# Patient Record
Sex: FEMALE | Race: WHITE | NOT HISPANIC OR LATINO | Employment: OTHER | URBAN - METROPOLITAN AREA
[De-identification: names, ages, dates, MRNs, and addresses within clinical notes are randomized per-mention and may not be internally consistent; named-entity substitution may affect disease eponyms.]

---

## 2017-01-09 ENCOUNTER — CONVERSION ENCOUNTER (OUTPATIENT)
Dept: GENERAL RADIOLOGY | Facility: HOSPITAL | Age: 72
End: 2017-01-09

## 2018-01-11 ENCOUNTER — CONVERSION ENCOUNTER (OUTPATIENT)
Dept: GENERAL RADIOLOGY | Facility: HOSPITAL | Age: 73
End: 2018-01-11

## 2019-02-01 ENCOUNTER — HOSPITAL ENCOUNTER (OUTPATIENT)
Dept: GENERAL RADIOLOGY | Facility: HOSPITAL | Age: 74
Discharge: HOME | End: 2019-02-01
Attending: INTERNAL MEDICINE

## 2019-05-02 ENCOUNTER — HOSPITAL ENCOUNTER (OUTPATIENT)
Dept: LAB | Facility: HOSPITAL | Age: 74
Discharge: HOME | End: 2019-05-02
Attending: INTERNAL MEDICINE

## 2019-05-02 LAB
ALBUMIN SERPL-MCNC: 4.2 G/DL (ref 3.5–5)
ALBUMIN/GLOB SERPL: 1.4 {RATIO} (ref 1.4–2.6)
ALP SERPL-CCNC: 56 U/L (ref 43–160)
ALT SERPL-CCNC: 9 U/L (ref 10–40)
ANION GAP SERPL CALC-SCNC: 19 MMOL/L (ref 8–19)
AST SERPL-CCNC: 19 U/L (ref 15–50)
BASOPHILS # BLD AUTO: 0.04 10*3/UL (ref 0–0.2)
BASOPHILS NFR BLD AUTO: 0.7 % (ref 0–3)
BILIRUB SERPL-MCNC: 0.79 MG/DL (ref 0.2–1.3)
BUN SERPL-MCNC: 17 MG/DL (ref 5–25)
BUN/CREAT SERPL: 18 {RATIO} (ref 6–20)
CALCIUM SERPL-MCNC: 9.8 MG/DL (ref 8.7–10.4)
CHLORIDE SERPL-SCNC: 100 MMOL/L (ref 99–111)
CONV ABS IMM GRAN: 0.01 10*3/UL (ref 0–0.2)
CONV CO2: 26 MMOL/L (ref 22–32)
CONV IMMATURE GRAN: 0.2 % (ref 0–1.8)
CONV TOTAL PROTEIN: 7.2 G/DL (ref 6.3–8.2)
CREAT UR-MCNC: 0.96 MG/DL (ref 0.5–0.9)
DEPRECATED RDW RBC AUTO: 46.2 FL (ref 36.4–46.3)
EOSINOPHIL # BLD AUTO: 0.16 10*3/UL (ref 0–0.7)
EOSINOPHIL # BLD AUTO: 2.9 % (ref 0–7)
ERYTHROCYTE [DISTWIDTH] IN BLOOD BY AUTOMATED COUNT: 13.1 % (ref 11.7–14.4)
EST. AVERAGE GLUCOSE BLD GHB EST-MCNC: 103 MG/DL
GFR SERPLBLD BASED ON 1.73 SQ M-ARVRAT: 57 ML/MIN/{1.73_M2}
GLOBULIN UR ELPH-MCNC: 3 G/DL (ref 2–3.5)
GLUCOSE SERPL-MCNC: 95 MG/DL (ref 65–99)
HBA1C MFR BLD: 12.7 G/DL (ref 12–16)
HBA1C MFR BLD: 5.2 % (ref 3.5–5.7)
HCT VFR BLD AUTO: 38.8 % (ref 37–47)
LYMPHOCYTES # BLD AUTO: 1.67 10*3/UL (ref 1–5)
MCH RBC QN AUTO: 31.4 PG (ref 27–31)
MCHC RBC AUTO-ENTMCNC: 32.7 G/DL (ref 33–37)
MCV RBC AUTO: 96 FL (ref 81–99)
MONOCYTES # BLD AUTO: 0.56 10*3/UL (ref 0.2–1.2)
MONOCYTES NFR BLD AUTO: 10.3 % (ref 3–10)
NEUTROPHILS # BLD AUTO: 3 10*3/UL (ref 2–8)
NEUTROPHILS NFR BLD AUTO: 55.2 % (ref 30–85)
NRBC CBCN: 0 % (ref 0–0.7)
OSMOLALITY SERPL CALC.SUM OF ELEC: 293 MOSM/KG (ref 273–304)
PLATELET # BLD AUTO: 190 10*3/UL (ref 130–400)
PMV BLD AUTO: 11.2 FL (ref 9.4–12.3)
POTASSIUM SERPL-SCNC: 4.3 MMOL/L (ref 3.5–5.3)
RBC # BLD AUTO: 4.04 10*6/UL (ref 4.2–5.4)
SODIUM SERPL-SCNC: 141 MMOL/L (ref 135–147)
VARIANT LYMPHS NFR BLD MANUAL: 30.7 % (ref 20–45)
WBC # BLD AUTO: 5.44 10*3/UL (ref 4.8–10.8)

## 2019-05-08 ENCOUNTER — HOSPITAL ENCOUNTER (OUTPATIENT)
Dept: GENERAL RADIOLOGY | Facility: HOSPITAL | Age: 74
Discharge: HOME | End: 2019-05-08
Attending: INTERNAL MEDICINE

## 2019-05-14 ENCOUNTER — HOSPITAL ENCOUNTER (OUTPATIENT)
Dept: GENERAL RADIOLOGY | Facility: HOSPITAL | Age: 74
Discharge: HOME | End: 2019-05-14
Attending: INTERNAL MEDICINE

## 2019-05-22 ENCOUNTER — HOSPITAL ENCOUNTER (OUTPATIENT)
Dept: GENERAL RADIOLOGY | Facility: HOSPITAL | Age: 74
Discharge: HOME | End: 2019-05-22
Attending: PHYSICIAN ASSISTANT

## 2019-06-20 ENCOUNTER — OFFICE VISIT CONVERTED (OUTPATIENT)
Dept: NEUROSURGERY | Facility: CLINIC | Age: 74
End: 2019-06-20
Attending: NEUROLOGICAL SURGERY

## 2019-06-20 ENCOUNTER — CONVERSION ENCOUNTER (OUTPATIENT)
Dept: NEUROLOGY | Facility: CLINIC | Age: 74
End: 2019-06-20

## 2019-08-01 ENCOUNTER — CONVERSION ENCOUNTER (OUTPATIENT)
Dept: NEUROLOGY | Facility: CLINIC | Age: 74
End: 2019-08-01

## 2019-08-01 ENCOUNTER — OFFICE VISIT CONVERTED (OUTPATIENT)
Dept: NEUROSURGERY | Facility: CLINIC | Age: 74
End: 2019-08-01
Attending: NEUROLOGICAL SURGERY

## 2019-12-18 ENCOUNTER — HOSPITAL ENCOUNTER (OUTPATIENT)
Dept: LAB | Facility: HOSPITAL | Age: 74
Discharge: HOME | End: 2019-12-18
Attending: INTERNAL MEDICINE

## 2019-12-18 LAB
ALBUMIN SERPL-MCNC: 4.3 G/DL (ref 3.5–5)
ALBUMIN/GLOB SERPL: 1.5 {RATIO} (ref 1.4–2.6)
ALP SERPL-CCNC: 57 U/L (ref 43–160)
ALT SERPL-CCNC: 9 U/L (ref 10–40)
ANION GAP SERPL CALC-SCNC: 15 MMOL/L (ref 8–19)
AST SERPL-CCNC: 16 U/L (ref 15–50)
BASOPHILS # BLD AUTO: 0.05 10*3/UL (ref 0–0.2)
BASOPHILS NFR BLD AUTO: 0.8 % (ref 0–3)
BILIRUB SERPL-MCNC: 0.48 MG/DL (ref 0.2–1.3)
BUN SERPL-MCNC: 23 MG/DL (ref 5–25)
BUN/CREAT SERPL: 27 {RATIO} (ref 6–20)
CALCIUM SERPL-MCNC: 9.3 MG/DL (ref 8.7–10.4)
CHLORIDE SERPL-SCNC: 101 MMOL/L (ref 99–111)
CONV ABS IMM GRAN: 0.02 10*3/UL (ref 0–0.2)
CONV CO2: 27 MMOL/L (ref 22–32)
CONV IMMATURE GRAN: 0.3 % (ref 0–1.8)
CONV TOTAL PROTEIN: 7.1 G/DL (ref 6.3–8.2)
CREAT UR-MCNC: 0.84 MG/DL (ref 0.5–0.9)
DEPRECATED RDW RBC AUTO: 46.1 FL (ref 36.4–46.3)
EOSINOPHIL # BLD AUTO: 0.18 10*3/UL (ref 0–0.7)
EOSINOPHIL # BLD AUTO: 2.8 % (ref 0–7)
ERYTHROCYTE [DISTWIDTH] IN BLOOD BY AUTOMATED COUNT: 12.8 % (ref 11.7–14.4)
EST. AVERAGE GLUCOSE BLD GHB EST-MCNC: 100 MG/DL
GFR SERPLBLD BASED ON 1.73 SQ M-ARVRAT: >60 ML/MIN/{1.73_M2}
GLOBULIN UR ELPH-MCNC: 2.8 G/DL (ref 2–3.5)
GLUCOSE SERPL-MCNC: 95 MG/DL (ref 65–99)
HBA1C MFR BLD: 5.1 % (ref 3.5–5.7)
HCT VFR BLD AUTO: 38.7 % (ref 37–47)
HGB BLD-MCNC: 12.7 G/DL (ref 12–16)
LYMPHOCYTES # BLD AUTO: 1.5 10*3/UL (ref 1–5)
LYMPHOCYTES NFR BLD AUTO: 23.5 % (ref 20–45)
MCH RBC QN AUTO: 31.8 PG (ref 27–31)
MCHC RBC AUTO-ENTMCNC: 32.8 G/DL (ref 33–37)
MCV RBC AUTO: 96.8 FL (ref 81–99)
MONOCYTES # BLD AUTO: 0.46 10*3/UL (ref 0.2–1.2)
MONOCYTES NFR BLD AUTO: 7.2 % (ref 3–10)
NEUTROPHILS # BLD AUTO: 4.17 10*3/UL (ref 2–8)
NEUTROPHILS NFR BLD AUTO: 65.4 % (ref 30–85)
NRBC CBCN: 0 % (ref 0–0.7)
OSMOLALITY SERPL CALC.SUM OF ELEC: 291 MOSM/KG (ref 273–304)
PLATELET # BLD AUTO: 191 10*3/UL (ref 130–400)
PMV BLD AUTO: 11.1 FL (ref 9.4–12.3)
POTASSIUM SERPL-SCNC: 4.3 MMOL/L (ref 3.5–5.3)
RBC # BLD AUTO: 4 10*6/UL (ref 4.2–5.4)
SODIUM SERPL-SCNC: 139 MMOL/L (ref 135–147)
TSH SERPL-ACNC: 3.02 M[IU]/L (ref 0.27–4.2)
WBC # BLD AUTO: 6.38 10*3/UL (ref 4.8–10.8)

## 2019-12-30 ENCOUNTER — HOSPITAL ENCOUNTER (OUTPATIENT)
Dept: GENERAL RADIOLOGY | Facility: HOSPITAL | Age: 74
Discharge: HOME | End: 2019-12-30
Attending: INTERNAL MEDICINE

## 2020-01-07 ENCOUNTER — HOSPITAL ENCOUNTER (OUTPATIENT)
Dept: GENERAL RADIOLOGY | Facility: HOSPITAL | Age: 75
Discharge: HOME | End: 2020-01-07
Attending: PHYSICIAN ASSISTANT

## 2020-01-07 LAB
CREAT BLD-MCNC: 0.9 MG/DL (ref 0.6–1.4)
GFR SERPLBLD BASED ON 1.73 SQ M-ARVRAT: >60 ML/MIN/{1.73_M2}

## 2020-02-03 ENCOUNTER — HOSPITAL ENCOUNTER (OUTPATIENT)
Dept: GENERAL RADIOLOGY | Facility: HOSPITAL | Age: 75
Discharge: HOME | End: 2020-02-03
Attending: INTERNAL MEDICINE

## 2020-06-24 ENCOUNTER — HOSPITAL ENCOUNTER (OUTPATIENT)
Dept: LAB | Facility: HOSPITAL | Age: 75
Discharge: HOME | End: 2020-06-24
Attending: INTERNAL MEDICINE

## 2020-06-24 LAB
ALBUMIN SERPL-MCNC: 4 G/DL (ref 3.5–5)
ALBUMIN/GLOB SERPL: 1.5 {RATIO} (ref 1.4–2.6)
ALP SERPL-CCNC: 65 U/L (ref 43–160)
ALT SERPL-CCNC: 9 U/L (ref 10–40)
ANION GAP SERPL CALC-SCNC: 15 MMOL/L (ref 8–19)
AST SERPL-CCNC: 16 U/L (ref 15–50)
BASOPHILS # BLD AUTO: 0.04 10*3/UL (ref 0–0.2)
BASOPHILS NFR BLD AUTO: 0.6 % (ref 0–3)
BILIRUB SERPL-MCNC: 0.48 MG/DL (ref 0.2–1.3)
BUN SERPL-MCNC: 14 MG/DL (ref 5–25)
BUN/CREAT SERPL: 16 {RATIO} (ref 6–20)
CALCIUM SERPL-MCNC: 9.5 MG/DL (ref 8.7–10.4)
CHLORIDE SERPL-SCNC: 104 MMOL/L (ref 99–111)
CHOLEST SERPL-MCNC: 145 MG/DL (ref 107–200)
CHOLEST/HDLC SERPL: 3 {RATIO} (ref 3–6)
CONV ABS IMM GRAN: 0.02 10*3/UL (ref 0–0.2)
CONV CO2: 26 MMOL/L (ref 22–32)
CONV IMMATURE GRAN: 0.3 % (ref 0–1.8)
CONV TOTAL PROTEIN: 6.7 G/DL (ref 6.3–8.2)
CREAT UR-MCNC: 0.85 MG/DL (ref 0.5–0.9)
DEPRECATED RDW RBC AUTO: 44.5 FL (ref 36.4–46.3)
EOSINOPHIL # BLD AUTO: 0.24 10*3/UL (ref 0–0.7)
EOSINOPHIL # BLD AUTO: 3.6 % (ref 0–7)
ERYTHROCYTE [DISTWIDTH] IN BLOOD BY AUTOMATED COUNT: 12.8 % (ref 11.7–14.4)
GFR SERPLBLD BASED ON 1.73 SQ M-ARVRAT: >60 ML/MIN/{1.73_M2}
GLOBULIN UR ELPH-MCNC: 2.7 G/DL (ref 2–3.5)
GLUCOSE SERPL-MCNC: 94 MG/DL (ref 65–99)
HCT VFR BLD AUTO: 38.2 % (ref 37–47)
HDLC SERPL-MCNC: 48 MG/DL (ref 40–60)
HGB BLD-MCNC: 12.5 G/DL (ref 12–16)
LDLC SERPL CALC-MCNC: 73 MG/DL (ref 70–100)
LYMPHOCYTES # BLD AUTO: 1.84 10*3/UL (ref 1–5)
LYMPHOCYTES NFR BLD AUTO: 27.7 % (ref 20–45)
MCH RBC QN AUTO: 31 PG (ref 27–31)
MCHC RBC AUTO-ENTMCNC: 32.7 G/DL (ref 33–37)
MCV RBC AUTO: 94.8 FL (ref 81–99)
MONOCYTES # BLD AUTO: 0.67 10*3/UL (ref 0.2–1.2)
MONOCYTES NFR BLD AUTO: 10.1 % (ref 3–10)
NEUTROPHILS # BLD AUTO: 3.83 10*3/UL (ref 2–8)
NEUTROPHILS NFR BLD AUTO: 57.7 % (ref 30–85)
NRBC CBCN: 0 % (ref 0–0.7)
OSMOLALITY SERPL CALC.SUM OF ELEC: 292 MOSM/KG (ref 273–304)
PLATELET # BLD AUTO: 198 10*3/UL (ref 130–400)
PMV BLD AUTO: 11.2 FL (ref 9.4–12.3)
POTASSIUM SERPL-SCNC: 4.3 MMOL/L (ref 3.5–5.3)
RBC # BLD AUTO: 4.03 10*6/UL (ref 4.2–5.4)
SODIUM SERPL-SCNC: 141 MMOL/L (ref 135–147)
TRIGL SERPL-MCNC: 122 MG/DL (ref 40–150)
VLDLC SERPL-MCNC: 24 MG/DL (ref 5–37)
WBC # BLD AUTO: 6.64 10*3/UL (ref 4.8–10.8)

## 2020-08-17 ENCOUNTER — HOSPITAL ENCOUNTER (OUTPATIENT)
Dept: LAB | Facility: HOSPITAL | Age: 75
Discharge: HOME | End: 2020-08-17
Attending: INTERNAL MEDICINE

## 2020-08-17 LAB
ANION GAP SERPL CALC-SCNC: 18 MMOL/L (ref 8–19)
BUN SERPL-MCNC: 16 MG/DL (ref 5–25)
BUN/CREAT SERPL: 15 {RATIO} (ref 6–20)
CALCIUM SERPL-MCNC: 10.2 MG/DL (ref 8.7–10.4)
CHLORIDE SERPL-SCNC: 102 MMOL/L (ref 99–111)
CONV CO2: 26 MMOL/L (ref 22–32)
CREAT UR-MCNC: 1.1 MG/DL (ref 0.5–0.9)
GFR SERPLBLD BASED ON 1.73 SQ M-ARVRAT: 48 ML/MIN/{1.73_M2}
GLUCOSE SERPL-MCNC: 105 MG/DL (ref 65–99)
OSMOLALITY SERPL CALC.SUM OF ELEC: 296 MOSM/KG (ref 273–304)
POTASSIUM SERPL-SCNC: 4.3 MMOL/L (ref 3.5–5.3)
SODIUM SERPL-SCNC: 142 MMOL/L (ref 135–147)

## 2021-02-04 ENCOUNTER — HOSPITAL ENCOUNTER (OUTPATIENT)
Dept: CARDIOLOGY | Facility: HOSPITAL | Age: 76
Discharge: HOME | End: 2021-02-04
Attending: INTERNAL MEDICINE

## 2021-02-08 ENCOUNTER — HOSPITAL ENCOUNTER (OUTPATIENT)
Dept: LAB | Facility: HOSPITAL | Age: 76
Discharge: HOME | End: 2021-02-08
Attending: INTERNAL MEDICINE

## 2021-02-08 LAB
ALBUMIN SERPL-MCNC: 4.1 G/DL (ref 3.5–5)
ALBUMIN/GLOB SERPL: 1.6 {RATIO} (ref 1.4–2.6)
ALP SERPL-CCNC: 52 U/L (ref 43–160)
ALT SERPL-CCNC: 9 U/L (ref 10–40)
ANION GAP SERPL CALC-SCNC: 16 MMOL/L (ref 8–19)
AST SERPL-CCNC: 18 U/L (ref 15–50)
BASOPHILS # BLD AUTO: 0.04 10*3/UL (ref 0–0.2)
BASOPHILS NFR BLD AUTO: 0.7 % (ref 0–3)
BILIRUB SERPL-MCNC: 0.5 MG/DL (ref 0.2–1.3)
BUN SERPL-MCNC: 22 MG/DL (ref 5–25)
BUN/CREAT SERPL: 25 {RATIO} (ref 6–20)
CALCIUM SERPL-MCNC: 9.5 MG/DL (ref 8.7–10.4)
CHLORIDE SERPL-SCNC: 104 MMOL/L (ref 99–111)
CONV ABS IMM GRAN: 0.02 10*3/UL (ref 0–0.2)
CONV CO2: 25 MMOL/L (ref 22–32)
CONV IMMATURE GRAN: 0.3 % (ref 0–1.8)
CONV TOTAL PROTEIN: 6.6 G/DL (ref 6.3–8.2)
CREAT UR-MCNC: 0.88 MG/DL (ref 0.5–0.9)
DEPRECATED RDW RBC AUTO: 46.8 FL (ref 36.4–46.3)
EOSINOPHIL # BLD AUTO: 0.15 10*3/UL (ref 0–0.7)
EOSINOPHIL # BLD AUTO: 2.5 % (ref 0–7)
ERYTHROCYTE [DISTWIDTH] IN BLOOD BY AUTOMATED COUNT: 13.2 % (ref 11.7–14.4)
GFR SERPLBLD BASED ON 1.73 SQ M-ARVRAT: >60 ML/MIN/{1.73_M2}
GLOBULIN UR ELPH-MCNC: 2.5 G/DL (ref 2–3.5)
GLUCOSE SERPL-MCNC: 97 MG/DL (ref 65–99)
HCT VFR BLD AUTO: 37.3 % (ref 37–47)
HGB BLD-MCNC: 12 G/DL (ref 12–16)
LYMPHOCYTES # BLD AUTO: 1.58 10*3/UL (ref 1–5)
LYMPHOCYTES NFR BLD AUTO: 26.6 % (ref 20–45)
MCH RBC QN AUTO: 30.9 PG (ref 27–31)
MCHC RBC AUTO-ENTMCNC: 32.2 G/DL (ref 33–37)
MCV RBC AUTO: 96.1 FL (ref 81–99)
MONOCYTES # BLD AUTO: 0.49 10*3/UL (ref 0.2–1.2)
MONOCYTES NFR BLD AUTO: 8.2 % (ref 3–10)
NEUTROPHILS # BLD AUTO: 3.66 10*3/UL (ref 2–8)
NEUTROPHILS NFR BLD AUTO: 61.7 % (ref 30–85)
NRBC CBCN: 0 % (ref 0–0.7)
OSMOLALITY SERPL CALC.SUM OF ELEC: 293 MOSM/KG (ref 273–304)
PLATELET # BLD AUTO: 181 10*3/UL (ref 130–400)
PMV BLD AUTO: 11.2 FL (ref 9.4–12.3)
POTASSIUM SERPL-SCNC: 4.6 MMOL/L (ref 3.5–5.3)
RBC # BLD AUTO: 3.88 10*6/UL (ref 4.2–5.4)
SODIUM SERPL-SCNC: 140 MMOL/L (ref 135–147)
TSH SERPL-ACNC: 3.5 M[IU]/L (ref 0.27–4.2)
WBC # BLD AUTO: 5.94 10*3/UL (ref 4.8–10.8)

## 2021-05-15 VITALS
DIASTOLIC BLOOD PRESSURE: 51 MMHG | SYSTOLIC BLOOD PRESSURE: 125 MMHG | WEIGHT: 250 LBS | HEIGHT: 65 IN | BODY MASS INDEX: 41.65 KG/M2

## 2021-05-15 VITALS
WEIGHT: 257 LBS | HEIGHT: 65 IN | SYSTOLIC BLOOD PRESSURE: 134 MMHG | DIASTOLIC BLOOD PRESSURE: 58 MMHG | BODY MASS INDEX: 42.82 KG/M2

## 2021-05-21 ENCOUNTER — HOSPITAL ENCOUNTER (OUTPATIENT)
Dept: GENERAL RADIOLOGY | Facility: HOSPITAL | Age: 76
Discharge: HOME | End: 2021-05-21
Attending: INTERNAL MEDICINE

## 2021-07-12 ENCOUNTER — TELEPHONE (OUTPATIENT)
Dept: INTERNAL MEDICINE | Facility: CLINIC | Age: 76
End: 2021-07-12

## 2021-07-12 NOTE — TELEPHONE ENCOUNTER
Pt. called stating that she has been experiencing pain and burning upon urination, and has noticed some blood for about a month. She has been taking OTC AZO thinking it would help but has been unsuccessful. She is wanting to know if she could get an order for an UA. Her call back number is [phone]. Pharmacy is [facility]

## 2021-07-13 ENCOUNTER — OFFICE VISIT (OUTPATIENT)
Dept: INTERNAL MEDICINE | Facility: CLINIC | Age: 76
End: 2021-07-13

## 2021-07-13 ENCOUNTER — LAB (OUTPATIENT)
Dept: LAB | Facility: HOSPITAL | Age: 76
End: 2021-07-13

## 2021-07-13 VITALS
BODY MASS INDEX: 42.3 KG/M2 | DIASTOLIC BLOOD PRESSURE: 79 MMHG | HEART RATE: 72 BPM | HEIGHT: 64 IN | WEIGHT: 247.8 LBS | SYSTOLIC BLOOD PRESSURE: 137 MMHG

## 2021-07-13 DIAGNOSIS — E03.8 HYPOTHYROIDISM DUE TO HASHIMOTO'S THYROIDITIS: ICD-10-CM

## 2021-07-13 DIAGNOSIS — E11.9 TYPE 2 DIABETES MELLITUS WITHOUT COMPLICATION, WITHOUT LONG-TERM CURRENT USE OF INSULIN (HCC): ICD-10-CM

## 2021-07-13 DIAGNOSIS — E66.01 MORBID (SEVERE) OBESITY DUE TO EXCESS CALORIES (HCC): ICD-10-CM

## 2021-07-13 DIAGNOSIS — E55.9 VITAMIN D DEFICIENCY, UNSPECIFIED: ICD-10-CM

## 2021-07-13 DIAGNOSIS — I10 ESSENTIAL (PRIMARY) HYPERTENSION: ICD-10-CM

## 2021-07-13 DIAGNOSIS — E06.3 HYPOTHYROIDISM DUE TO HASHIMOTO'S THYROIDITIS: ICD-10-CM

## 2021-07-13 DIAGNOSIS — E78.00 HYPERCHOLESTEROLEMIA WITH LDL GREATER THAN 190 MG/DL: ICD-10-CM

## 2021-07-13 DIAGNOSIS — E55.9 VITAMIN D DEFICIENCY, UNSPECIFIED: Primary | ICD-10-CM

## 2021-07-13 LAB
ALBUMIN SERPL-MCNC: 4.5 G/DL (ref 3.5–5.2)
ALBUMIN/GLOB SERPL: 2 G/DL
ALP SERPL-CCNC: 51 U/L (ref 39–117)
ALT SERPL W P-5'-P-CCNC: 8 U/L (ref 1–33)
ANION GAP SERPL CALCULATED.3IONS-SCNC: 10.8 MMOL/L (ref 5–15)
AST SERPL-CCNC: 17 U/L (ref 1–32)
BACTERIA UR QL AUTO: ABNORMAL /HPF
BASOPHILS # BLD AUTO: 0.05 10*3/MM3 (ref 0–0.2)
BASOPHILS NFR BLD AUTO: 0.7 % (ref 0–1.5)
BILIRUB SERPL-MCNC: 0.5 MG/DL (ref 0–1.2)
BILIRUB UR QL STRIP: NEGATIVE
BUN SERPL-MCNC: 18 MG/DL (ref 8–23)
BUN/CREAT SERPL: 21.7 (ref 7–25)
CALCIUM SPEC-SCNC: 9.8 MG/DL (ref 8.6–10.5)
CHLORIDE SERPL-SCNC: 105 MMOL/L (ref 98–107)
CLARITY UR: CLEAR
CO2 SERPL-SCNC: 25.2 MMOL/L (ref 22–29)
COLOR UR: YELLOW
CREAT SERPL-MCNC: 0.83 MG/DL (ref 0.57–1)
DEPRECATED RDW RBC AUTO: 44.8 FL (ref 37–54)
EOSINOPHIL # BLD AUTO: 0.14 10*3/MM3 (ref 0–0.4)
EOSINOPHIL NFR BLD AUTO: 1.9 % (ref 0.3–6.2)
ERYTHROCYTE [DISTWIDTH] IN BLOOD BY AUTOMATED COUNT: 13.1 % (ref 12.3–15.4)
GFR SERPL CREATININE-BSD FRML MDRD: 66 ML/MIN/1.73
GLOBULIN UR ELPH-MCNC: 2.3 GM/DL
GLUCOSE SERPL-MCNC: 85 MG/DL (ref 65–99)
GLUCOSE UR STRIP-MCNC: NEGATIVE MG/DL
HCT VFR BLD AUTO: 39.4 % (ref 34–46.6)
HGB BLD-MCNC: 13.2 G/DL (ref 12–15.9)
HGB UR QL STRIP.AUTO: NEGATIVE
HYALINE CASTS UR QL AUTO: ABNORMAL /LPF
IMM GRANULOCYTES # BLD AUTO: 0.02 10*3/MM3 (ref 0–0.05)
IMM GRANULOCYTES NFR BLD AUTO: 0.3 % (ref 0–0.5)
IRON 24H UR-MRATE: 71 MCG/DL (ref 37–145)
IRON SATN MFR SERPL: 17 % (ref 20–50)
KETONES UR QL STRIP: NEGATIVE
LEUKOCYTE ESTERASE UR QL STRIP.AUTO: ABNORMAL
LYMPHOCYTES # BLD AUTO: 1.97 10*3/MM3 (ref 0.7–3.1)
LYMPHOCYTES NFR BLD AUTO: 26.5 % (ref 19.6–45.3)
MAGNESIUM SERPL-MCNC: 2.1 MG/DL (ref 1.6–2.4)
MCH RBC QN AUTO: 31.7 PG (ref 26.6–33)
MCHC RBC AUTO-ENTMCNC: 33.5 G/DL (ref 31.5–35.7)
MCV RBC AUTO: 94.5 FL (ref 79–97)
MONOCYTES # BLD AUTO: 0.74 10*3/MM3 (ref 0.1–0.9)
MONOCYTES NFR BLD AUTO: 9.9 % (ref 5–12)
NEUTROPHILS NFR BLD AUTO: 4.52 10*3/MM3 (ref 1.7–7)
NEUTROPHILS NFR BLD AUTO: 60.7 % (ref 42.7–76)
NITRITE UR QL STRIP: NEGATIVE
NRBC BLD AUTO-RTO: 0 /100 WBC (ref 0–0.2)
PH UR STRIP.AUTO: 6.5 [PH] (ref 5–8)
PLATELET # BLD AUTO: 200 10*3/MM3 (ref 140–450)
PMV BLD AUTO: 11.1 FL (ref 6–12)
POTASSIUM SERPL-SCNC: 4.3 MMOL/L (ref 3.5–5.2)
PROT SERPL-MCNC: 6.8 G/DL (ref 6–8.5)
PROT UR QL STRIP: NEGATIVE
RBC # BLD AUTO: 4.17 10*6/MM3 (ref 3.77–5.28)
RBC # UR: ABNORMAL /HPF
REF LAB TEST METHOD: ABNORMAL
SODIUM SERPL-SCNC: 141 MMOL/L (ref 136–145)
SP GR UR STRIP: 1.02 (ref 1–1.03)
SQUAMOUS #/AREA URNS HPF: ABNORMAL /HPF
T4 FREE SERPL-MCNC: 1.47 NG/DL (ref 0.93–1.7)
TIBC SERPL-MCNC: 410 MCG/DL (ref 298–536)
TRANSFERRIN SERPL-MCNC: 275 MG/DL (ref 200–360)
TSH SERPL DL<=0.05 MIU/L-ACNC: 1.85 UIU/ML (ref 0.27–4.2)
UROBILINOGEN UR QL STRIP: ABNORMAL
WBC # BLD AUTO: 7.44 10*3/MM3 (ref 3.4–10.8)
WBC UR QL AUTO: ABNORMAL /HPF

## 2021-07-13 PROCEDURE — 36415 COLL VENOUS BLD VENIPUNCTURE: CPT

## 2021-07-13 PROCEDURE — 84443 ASSAY THYROID STIM HORMONE: CPT

## 2021-07-13 PROCEDURE — 81001 URINALYSIS AUTO W/SCOPE: CPT

## 2021-07-13 PROCEDURE — 83540 ASSAY OF IRON: CPT

## 2021-07-13 PROCEDURE — 80053 COMPREHEN METABOLIC PANEL: CPT

## 2021-07-13 PROCEDURE — 83735 ASSAY OF MAGNESIUM: CPT

## 2021-07-13 PROCEDURE — 87086 URINE CULTURE/COLONY COUNT: CPT

## 2021-07-13 PROCEDURE — 99214 OFFICE O/P EST MOD 30 MIN: CPT | Performed by: INTERNAL MEDICINE

## 2021-07-13 PROCEDURE — 84439 ASSAY OF FREE THYROXINE: CPT

## 2021-07-13 PROCEDURE — 85025 COMPLETE CBC W/AUTO DIFF WBC: CPT

## 2021-07-13 PROCEDURE — 84466 ASSAY OF TRANSFERRIN: CPT

## 2021-07-13 RX ORDER — TEMAZEPAM 15 MG/1
15 CAPSULE ORAL
COMMUNITY
Start: 2021-06-10 | End: 2021-09-09 | Stop reason: SDUPTHER

## 2021-07-13 RX ORDER — PANTOPRAZOLE SODIUM 40 MG/10ML
INJECTION, POWDER, LYOPHILIZED, FOR SOLUTION INTRAVENOUS EVERY 24 HOURS
COMMUNITY
Start: 2021-03-02 | End: 2021-10-14

## 2021-07-13 RX ORDER — CIPROFLOXACIN 500 MG/1
500 TABLET, FILM COATED ORAL 2 TIMES DAILY
Qty: 20 TABLET | Refills: 0 | Status: SHIPPED | OUTPATIENT
Start: 2021-07-13 | End: 2021-07-23

## 2021-07-13 RX ORDER — METRONIDAZOLE 250 MG/1
250 TABLET ORAL 4 TIMES DAILY
Qty: 40 TABLET | Refills: 0 | Status: SHIPPED | OUTPATIENT
Start: 2021-07-13 | End: 2021-10-14

## 2021-07-13 RX ORDER — DIPHENHYDRAMINE HCL 25 MG
TABLET ORAL
COMMUNITY
End: 2021-12-14 | Stop reason: ALTCHOICE

## 2021-07-13 RX ORDER — OLMESARTAN MEDOXOMIL AND HYDROCHLOROTHIAZIDE 20/12.5 20; 12.5 MG/1; MG/1
0.5 TABLET ORAL DAILY
COMMUNITY
Start: 2021-05-05 | End: 2021-12-07

## 2021-07-13 RX ORDER — FOLIC ACID 0.8 MG
500 TABLET ORAL EVERY OTHER DAY
COMMUNITY

## 2021-07-13 NOTE — PROGRESS NOTES
"Chief Complaint  possible uti/ kidney infection (patient thought she had a uti, took two boxes of azo, she has been having mid to low back down to her lower stomach. she states her stool was hard and since this has all been going on she has liquid runny stools. pain has been going on for about a month now. she is extremely worried about this, whats going on, and what to do for now. she said there was an odor from her urine before she took the azo, after taking the smell subsided. she said it was a funky smell. ); pain in mid to low back, down around the bottom of her stoma (all of this has been going on around a month now. ); and Rectal Bleeding (twice last week, bright red blood)    Subjective          Bella Brandt presents to [facility]      Objective   Vital Signs  Vitals:    07/13/21 1428   BP: 137/79   BP Location: Right arm   Patient Position: Sitting   Cuff Size: Small Adult   Pulse: 72   Weight: 112 kg (247 lb 12.8 oz)   Height: 162 cm (63.78\")      Review of Systems   Diarrhea ,  Some blood  Bright red  As above   Physical Exam     abd soft, obese, nt,   Patient is ambulatory she is alert and oriented x3 lungs are clear posterior, cardiac exam regular rhythm lower extremities no pitting edema, no skin rashes respiratory effort is normal,  Abdomen is mildly tender in the left lower quadrant with palpation no guarding no rigidity, 2+ DP pulses, neck is supple, patient's alert and oriented x3  Result Review :   No results found for: PROBNP, BNP  CMP    CMP 8/17/20 2/8/21   Glucose 105 (A) 97   BUN 16 22   Creatinine 1.10 (A) 0.88   Sodium 142 140   Potassium 4.3 4.6   Chloride 102 104   Calcium 10.2 9.5   Albumin  4.1   Total Bilirubin  0.50   Alkaline Phosphatase  52   AST (SGOT)  18   ALT (SGPT)  9 (A)   (A) Abnormal value            CBC w/diff    CBC w/Diff 2/8/21   WBC 5.94   RBC 3.88 (A)   Hemoglobin 12.0   Hematocrit 37.3   MCV 96.1   MCH 30.9   MCHC 32.2 (A)   RDW " 13.2   Platelets 181   Neutrophil Rel % 61.7   Lymphocyte Rel % 26.6   Monocyte Rel % 8.2   Eosinophil Rel % 2.5   Basophil Rel % 0.7   (A) Abnormal value                Lab Results   Component Value Date    TSH 3.500 02/08/2021    TSH 3.020 12/18/2019      No results found for: FREET4                       Assessment and Plan      Abdominal pain, left lower quadrant possible diverticulitis with recent GI bleeding bright red blood, will do CAT scan and treat with Flagyl and Cipro, patient is to stay on a bland diet,--- will refer to gastroenterology Dr. Perez for possible colonoscopy  UTI, will treat with Cipro as above anyway, will check urinalysis and lab work today July 13, 2021    Hypertension--on losartan 20/12.5 mg HCT half a tablet daily  Previous neck pain with work-up at [facility] with MRI epidural injections June 2020  Mitral valve prolapse moderate mitral regurgitation diastolic dysfunction normal LV function October 2017 by echo for heart murmur, repeat echo February 2021 was technically difficult but normal LV function mild mitral valve prolapse and mild mitral regurgitation unchanged from prior echo  Previous visual changes 2017 March MRI brain normal  Anxiety depression no medications  Insomnia, currently on temazepam 15 mg nightly  History of congestive heart failure volume overload resolved off Lasix  Previous left total knee arthroplasty January 2014  Previous KIANA, right total knee replacement 2013  Vitamin D deficiency  Cologuard testing January 2020 -  Left breast cancer left mastectomy January 2011 has finished follow-up with oncology          Follow Up   No follow-ups on file.  Patient was given instructions and counseling regarding her condition or for health maintenance advice. Please see specific information pulled into the AVS if appropriate.

## 2021-07-15 LAB — BACTERIA SPEC AEROBE CULT: NORMAL

## 2021-07-16 ENCOUNTER — TELEPHONE (OUTPATIENT)
Dept: INTERNAL MEDICINE | Facility: CLINIC | Age: 76
End: 2021-07-16

## 2021-07-22 ENCOUNTER — HOSPITAL ENCOUNTER (OUTPATIENT)
Dept: CT IMAGING | Facility: HOSPITAL | Age: 76
Discharge: HOME | End: 2021-07-22
Admitting: INTERNAL MEDICINE

## 2021-07-22 DIAGNOSIS — E66.01 MORBID (SEVERE) OBESITY DUE TO EXCESS CALORIES (HCC): ICD-10-CM

## 2021-07-22 DIAGNOSIS — E03.8 HYPOTHYROIDISM DUE TO HASHIMOTO'S THYROIDITIS: ICD-10-CM

## 2021-07-22 DIAGNOSIS — E06.3 HYPOTHYROIDISM DUE TO HASHIMOTO'S THYROIDITIS: ICD-10-CM

## 2021-07-22 DIAGNOSIS — E11.9 TYPE 2 DIABETES MELLITUS WITHOUT COMPLICATION, WITHOUT LONG-TERM CURRENT USE OF INSULIN (HCC): ICD-10-CM

## 2021-07-22 DIAGNOSIS — E55.9 VITAMIN D DEFICIENCY, UNSPECIFIED: ICD-10-CM

## 2021-07-22 DIAGNOSIS — I10 ESSENTIAL (PRIMARY) HYPERTENSION: ICD-10-CM

## 2021-07-22 DIAGNOSIS — E78.00 HYPERCHOLESTEROLEMIA WITH LDL GREATER THAN 190 MG/DL: ICD-10-CM

## 2021-07-22 PROCEDURE — 74178 CT ABD&PLV WO CNTR FLWD CNTR: CPT

## 2021-07-22 PROCEDURE — 74178 CT ABD&PLV WO CNTR FLWD CNTR: CPT | Performed by: RADIOLOGY

## 2021-07-22 PROCEDURE — 0 IOPAMIDOL PER 1 ML: Performed by: INTERNAL MEDICINE

## 2021-07-22 RX ADMIN — IOPAMIDOL 100 ML: 755 INJECTION, SOLUTION INTRAVENOUS at 15:23

## 2021-08-03 ENCOUNTER — TELEPHONE (OUTPATIENT)
Dept: INTERNAL MEDICINE | Facility: CLINIC | Age: 76
End: 2021-08-03

## 2021-09-03 ENCOUNTER — LAB (OUTPATIENT)
Dept: LAB | Facility: HOSPITAL | Age: 76
End: 2021-09-03

## 2021-09-03 ENCOUNTER — TRANSCRIBE ORDERS (OUTPATIENT)
Dept: LAB | Facility: HOSPITAL | Age: 76
End: 2021-09-03

## 2021-09-03 DIAGNOSIS — I10 ESSENTIAL HYPERTENSION: Primary | ICD-10-CM

## 2021-09-03 DIAGNOSIS — E78.00 HYPERCHOLESTEROLEMIA WITH LDL GREATER THAN 190 MG/DL: ICD-10-CM

## 2021-09-03 DIAGNOSIS — E11.9 DIABETES MELLITUS WITHOUT COMPLICATION (HCC): ICD-10-CM

## 2021-09-03 DIAGNOSIS — I10 ESSENTIAL HYPERTENSION: ICD-10-CM

## 2021-09-03 LAB
ALBUMIN SERPL-MCNC: 4.4 G/DL (ref 3.5–5.2)
ALBUMIN UR-MCNC: <1.2 MG/DL
ALBUMIN/GLOB SERPL: 1.7 G/DL
ALP SERPL-CCNC: 54 U/L (ref 39–117)
ALT SERPL W P-5'-P-CCNC: 5 U/L (ref 1–33)
ANION GAP SERPL CALCULATED.3IONS-SCNC: 10.9 MMOL/L (ref 5–15)
AST SERPL-CCNC: 14 U/L (ref 1–32)
BASOPHILS # BLD AUTO: 0.05 10*3/MM3 (ref 0–0.2)
BASOPHILS NFR BLD AUTO: 0.9 % (ref 0–1.5)
BILIRUB SERPL-MCNC: 0.5 MG/DL (ref 0–1.2)
BUN SERPL-MCNC: 19 MG/DL (ref 8–23)
BUN/CREAT SERPL: 17.8 (ref 7–25)
CALCIUM SPEC-SCNC: 9.7 MG/DL (ref 8.6–10.5)
CHLORIDE SERPL-SCNC: 101 MMOL/L (ref 98–107)
CHOLEST SERPL-MCNC: 150 MG/DL (ref 0–200)
CO2 SERPL-SCNC: 27.1 MMOL/L (ref 22–29)
CREAT SERPL-MCNC: 1.07 MG/DL (ref 0.57–1)
DEPRECATED RDW RBC AUTO: 45.5 FL (ref 37–54)
EOSINOPHIL # BLD AUTO: 0.25 10*3/MM3 (ref 0–0.4)
EOSINOPHIL NFR BLD AUTO: 4.6 % (ref 0.3–6.2)
ERYTHROCYTE [DISTWIDTH] IN BLOOD BY AUTOMATED COUNT: 12.9 % (ref 12.3–15.4)
GFR SERPL CREATININE-BSD FRML MDRD: 50 ML/MIN/1.73
GLOBULIN UR ELPH-MCNC: 2.6 GM/DL
GLUCOSE SERPL-MCNC: 90 MG/DL (ref 65–99)
HBA1C MFR BLD: 5.38 % (ref 4.8–5.6)
HCT VFR BLD AUTO: 37 % (ref 34–46.6)
HDLC SERPL-MCNC: 41 MG/DL (ref 40–60)
HGB BLD-MCNC: 12.3 G/DL (ref 12–15.9)
IMM GRANULOCYTES # BLD AUTO: 0.02 10*3/MM3 (ref 0–0.05)
IMM GRANULOCYTES NFR BLD AUTO: 0.4 % (ref 0–0.5)
LDLC SERPL CALC-MCNC: 85 MG/DL (ref 0–100)
LDLC/HDLC SERPL: 1.99 {RATIO}
LYMPHOCYTES # BLD AUTO: 1.16 10*3/MM3 (ref 0.7–3.1)
LYMPHOCYTES NFR BLD AUTO: 21.5 % (ref 19.6–45.3)
MCH RBC QN AUTO: 31.9 PG (ref 26.6–33)
MCHC RBC AUTO-ENTMCNC: 33.2 G/DL (ref 31.5–35.7)
MCV RBC AUTO: 96.1 FL (ref 79–97)
MONOCYTES # BLD AUTO: 0.52 10*3/MM3 (ref 0.1–0.9)
MONOCYTES NFR BLD AUTO: 9.6 % (ref 5–12)
NEUTROPHILS NFR BLD AUTO: 3.39 10*3/MM3 (ref 1.7–7)
NEUTROPHILS NFR BLD AUTO: 63 % (ref 42.7–76)
NRBC BLD AUTO-RTO: 0 /100 WBC (ref 0–0.2)
PLATELET # BLD AUTO: 197 10*3/MM3 (ref 140–450)
PMV BLD AUTO: 10.6 FL (ref 6–12)
POTASSIUM SERPL-SCNC: 4.9 MMOL/L (ref 3.5–5.2)
PROT SERPL-MCNC: 7 G/DL (ref 6–8.5)
RBC # BLD AUTO: 3.85 10*6/MM3 (ref 3.77–5.28)
SODIUM SERPL-SCNC: 139 MMOL/L (ref 136–145)
TRIGL SERPL-MCNC: 137 MG/DL (ref 0–150)
VLDLC SERPL-MCNC: 24 MG/DL (ref 5–40)
WBC # BLD AUTO: 5.39 10*3/MM3 (ref 3.4–10.8)

## 2021-09-03 PROCEDURE — 83036 HEMOGLOBIN GLYCOSYLATED A1C: CPT

## 2021-09-03 PROCEDURE — 36415 COLL VENOUS BLD VENIPUNCTURE: CPT

## 2021-09-03 PROCEDURE — 82043 UR ALBUMIN QUANTITATIVE: CPT

## 2021-09-03 PROCEDURE — 80053 COMPREHEN METABOLIC PANEL: CPT

## 2021-09-03 PROCEDURE — 85025 COMPLETE CBC W/AUTO DIFF WBC: CPT

## 2021-09-03 PROCEDURE — 80061 LIPID PANEL: CPT

## 2021-09-09 ENCOUNTER — OFFICE VISIT (OUTPATIENT)
Dept: INTERNAL MEDICINE | Facility: CLINIC | Age: 76
End: 2021-09-09

## 2021-09-09 VITALS
BODY MASS INDEX: 42.61 KG/M2 | DIASTOLIC BLOOD PRESSURE: 80 MMHG | WEIGHT: 249.6 LBS | SYSTOLIC BLOOD PRESSURE: 157 MMHG | OXYGEN SATURATION: 97 % | HEART RATE: 78 BPM | HEIGHT: 64 IN | TEMPERATURE: 97.2 F

## 2021-09-09 DIAGNOSIS — Z12.31 SCREENING MAMMOGRAM, ENCOUNTER FOR: ICD-10-CM

## 2021-09-09 DIAGNOSIS — R73.01 IFG (IMPAIRED FASTING GLUCOSE): Primary | ICD-10-CM

## 2021-09-09 DIAGNOSIS — F41.1 ANXIETY, GENERALIZED: ICD-10-CM

## 2021-09-09 DIAGNOSIS — I10 HYPERTENSION, ESSENTIAL: ICD-10-CM

## 2021-09-09 DIAGNOSIS — E55.9 VITAMIN D DEFICIENCY, UNSPECIFIED: ICD-10-CM

## 2021-09-09 DIAGNOSIS — K57.32 DIVERTICULITIS OF LARGE INTESTINE WITHOUT PERFORATION OR ABSCESS WITHOUT BLEEDING: ICD-10-CM

## 2021-09-09 DIAGNOSIS — F32.0 CURRENT MILD EPISODE OF MAJOR DEPRESSIVE DISORDER, UNSPECIFIED WHETHER RECURRENT (HCC): ICD-10-CM

## 2021-09-09 PROCEDURE — 99214 OFFICE O/P EST MOD 30 MIN: CPT | Performed by: INTERNAL MEDICINE

## 2021-09-09 RX ORDER — TEMAZEPAM 15 MG/1
15 CAPSULE ORAL
Qty: 30 CAPSULE | Refills: 5 | Status: SHIPPED | OUTPATIENT
Start: 2021-09-09 | End: 2021-12-14 | Stop reason: ALTCHOICE

## 2021-09-09 NOTE — PROGRESS NOTES
"Chief Complaint  Follow-up (follow up with labs done. )    Subjective  wgt loss issues and ? Re diet  ? Re covid booster           Bella Brandt presents to [facility]      Objective   Vital Signs  Vitals:    09/09/21 1324   BP: 157/80   BP Location: Right arm   Patient Position: Sitting   Cuff Size: Large Adult   Pulse: 78   Temp: 97.2 °F (36.2 °C)   TempSrc: Temporal   SpO2: 97%   Weight: 113 kg (249 lb 9.6 oz)   Height: 163 cm (64.17\")      Review of Systems   Constitutional: Negative.    HENT: Negative.    Eyes: Negative.    Respiratory: Negative.    Cardiovascular: Negative.    Gastrointestinal: Negative.    Endocrine: Negative.    Genitourinary: Negative.    Musculoskeletal: Negative.    Allergic/Immunologic: Negative.    Neurological: Negative.    Hematological: Negative.    Psychiatric/Behavioral: Negative.       Physical Exam     abd soft, obese, nt,   Patient is ambulatory she is alert and oriented x3 lungs are clear posterior, cardiac exam regular rhythm lower extremities no pitting edema, no skin rashes respiratory effort is normal,  Abdomen is mildly tender in the left lower quadrant with palpation no guarding no rigidity, 2+ DP pulses, neck is supple, patient's alert and oriented x3  Result Review :   No results found for: PROBNP, BNP  CMP    CMP 2/8/21 7/13/21 9/3/21   Glucose  85 90   Glucose 97     BUN 22 18 19   Creatinine 0.88 0.83 1.07 (A)   eGFR Non African Am  66 50 (A)   Sodium 140 141 139   Potassium 4.6 4.3 4.9   Chloride 104 105 101   Calcium 9.5 9.8 9.7   Albumin 4.1 4.50 4.40   Total Bilirubin 0.50 0.5 0.5   Alkaline Phosphatase 52 51 54   AST (SGOT) 18 17 14   ALT (SGPT) 9 (A) 8 5   (A) Abnormal value            CBC w/diff    CBC w/Diff 2/8/21   WBC 5.94   RBC 3.88 (A)   Hemoglobin 12.0   Hematocrit 37.3   MCV 96.1   MCH 30.9   MCHC 32.2 (A)   RDW 13.2   Platelets 181   Neutrophil Rel % 61.7   Lymphocyte Rel % 26.6   Monocyte Rel % 8.2   Eosinophil " Rel % 2.5   Basophil Rel % 0.7   (A) Abnormal value             Lipid Panel    Lipid Panel 9/3/21   Total Cholesterol 150   Triglycerides 137   HDL Cholesterol 41   VLDL Cholesterol 24   LDL Cholesterol  85   LDL/HDL Ratio 1.99            Lab Results   Component Value Date    TSH 1.850 07/13/2021    TSH 3.500 02/08/2021    TSH 3.020 12/18/2019      Lab Results   Component Value Date    FREET4 1.47 07/13/2021      A1C Last 3 Results    HGBA1C Last 3 Results 9/3/21   Hemoglobin A1C 5.38                               Assessment and Plan      Abdominal pain, July 13, 2021---- left lower quadrant possible diverticulitis with recent GI bleeding bright red blood, CAT scan ----and treat with Flagyl and Cipro,  will refer to gastroenterology Dr. Perez for possible colonoscopy patient has colonoscopy coming up in October, CT scan July 2021 showed no obvious lesions in the colon no suspicion for colon cancer or diverticulitis at that time, clinically improved, continue follow-up    Impaired fasting glucose, hemoglobin globin A1c 5.3 September 2021 diagnosis discussed with patient,    Hypertension--on losartan 20/12.5 mg HCT---- half a tablet daily    Previous neck pain with work-up at [facility] with MRI epidural injections June 2020    Mitral valve prolapse moderate mitral regurgitation diastolic dysfunction normal LV function October 2017 by echo for heart murmur, repeat echo February 2021 was technically difficult but normal LV function mild mitral valve prolapse and mild mitral regurgitation unchanged from prior echo    Previous visual changes 2017 March MRI brain normal    Anxiety depression no medications    Insomnia, currently on temazepam 15 mg nightly    History of congestive heart failure/ volume overload --resolved off Lasix    left total knee arthroplasty January 2014, KIANA, right total knee  2013  Vitamin D deficiency    Cologuard testing January 2020   Negative    Left breast cancer left mastectomy  January 2011     Follow Up   No follow-ups on file.  Patient was given instructions and counseling regarding her condition or for health maintenance advice. Please see specific information pulled into the AVS if appropriate.

## 2021-09-16 ENCOUNTER — TRANSCRIBE ORDERS (OUTPATIENT)
Dept: ADMINISTRATIVE | Facility: HOSPITAL | Age: 76
End: 2021-09-16

## 2021-09-16 DIAGNOSIS — Z12.31 VISIT FOR SCREENING MAMMOGRAM: Primary | ICD-10-CM

## 2021-10-13 NOTE — PROGRESS NOTES
Chief Complaint        Abdominal pain, GERD, diarrhea, and constipation    History of Present Illness      Bella Brandt is a 76 y.o. female who presents to [facility] as a new patient with a history of altered bowel habits, constipation, diarrhea, hematochezia, family history of colon cancer and reflux.  Patient reports that she has family history of colon cancer in her sister who was diagnosed with colon cancer at 70.  She has been doing colonoscopies every 5 years since her sisters diagnosis up until age 68.  At 68 she attempted to do a colonoscopy but passed out while performing the prep and the colonoscopy was canceled.  She reports about 3 months ago having altered bowel habits constipation to diarrhea with hematochezia varying in amount.  She reports that hematochezia has been ongoing for about a year intermittently.  She reports having a CT scan with her primary care that recommended a colonoscopy as well.  She has been on a bland diet.  She has realized that certain foods cause her abdominal pain in the left lower quadrant.  Her reflux is well controlled with Protonix and she is in need of a refill.  She reports intermittent left-sided abdominal pain when she is having constipation.  Patient denies fever, nausea, vomiting, weight loss, night sweats, melena, hematemesis.    CT abdomen and pelvis performed on 7/22/2021 punctate left side nephrolith.  No suspicious renal lesion.  Asymmetric soft tissue in the left aspect of the bladder similar to previous exam and a doubtful significance.  There is no oral contrast in the distal colon with a history of rectal bleeding consider endoscopy.  Hiatal hernia.    Patient states her last colonoscopy and EGD was performed by Dr. Sauceda in 2013 normal.    Results       Result Review :   The following data was reviewed by: Vanessa Ortiz NP on 10/14/2021     CMP    CMP 2/8/21 7/13/21 9/3/21   Glucose  85 90   Glucose 97     BUN 22 18 19    Creatinine 0.88 0.83 1.07 (A)   eGFR Non African Am  66 50 (A)   Sodium 140 141 139   Potassium 4.6 4.3 4.9   Chloride 104 105 101   Calcium 9.5 9.8 9.7   Albumin 4.1 4.50 4.40   Total Bilirubin 0.50 0.5 0.5   Alkaline Phosphatase 52 51 54   AST (SGOT) 18 17 14   ALT (SGPT) 9 (A) 8 5   (A) Abnormal value            CBC    CBC 2/8/21 7/13/21 9/3/21   WBC 5.94 7.44 5.39   RBC 3.88 (A) 4.17 3.85   Hemoglobin 12.0 13.2 12.3   Hematocrit 37.3 39.4 37.0   MCV 96.1 94.5 96.1   MCH 30.9 31.7 31.9   MCHC 32.2 (A) 33.5 33.2   RDW 13.2 13.1 12.9   Platelets 181 200 197   (A) Abnormal value                   Past Medical History       Past Medical History:   Diagnosis Date   • Allergies    • Back pain    • Carotid bruit    • Chronic fatigue, unspecified    • Depression    • Depression    • Essential (primary) hypertension .   • GERD without esophagitis    • Heart failure, unspecified (HCC)    • Hypercholesterolemia with LDL greater than 190 mg/dL    • Hypothyroidism    • Syncope and collapse    • Type 2 diabetes mellitus without complication (HCC)    • Vitamin D deficiency, unspecified        Past Surgical History:   Procedure Laterality Date   • BREAST BIOPSY     • CATARACT EXTRACTION     •  SECTION     • COLONOSCOPY      C-Scope 203 and 208   • HYSTERECTOMY      partial    • MASTECTOMY     • REPLACEMENT TOTAL KNEE Right 2013   • TONSILLECTOMY     • UPPER GASTROINTESTINAL ENDOSCOPY           Current Outpatient Medications:   •  diphenhydrAMINE ([employer] Allergy) 25 MG tablet, [employer] Allergy 25 mg oral tablet take  tablet by oral route daily   Active, Disp: , Rfl:   •  Magnesium 500 MG capsule, , Disp: , Rfl:   •  olmesartan-hydrochlorothiazide (BENICAR HCT) 20-12.5 MG per tablet, Take 0.5 tablets by mouth Daily., Disp: , Rfl:   •  temazepam (RESTORIL) 15 MG capsule, Take 1 capsule by mouth every night at bedtime., Disp: 30 capsule, Rfl: 5  •  hyoscyamine (ANASPAZ,LEVSIN) 0.125 MG tablet, Take 1 tablet  "by mouth Every 4 (Four) Hours As Needed for Cramping., Disp: 90 tablet, Rfl: 1  •  pantoprazole (PROTONIX) 40 MG EC tablet, Take 1 tablet by mouth Daily., Disp: 90 tablet, Rfl: 1     Allergies   Allergen Reactions   • Nsaids Unknown - Low Severity       Family History   Problem Relation Age of Onset   • Heart attack Sister    • Colon cancer Sister    • Colon cancer Brother         Social History     Social History Narrative   • Not on file       Objective       Objective     Vital Signs:   /63 (BP Location: Right arm, Patient Position: Sitting, Cuff Size: Adult)   Pulse 70   Ht 163 cm (64.17\")   Wt 113 kg (250 lb 3.2 oz)   SpO2 99%   BMI 42.72 kg/m²     Body mass index is 42.72 kg/m².    Physical Exam  Constitutional:       General: She is not in acute distress.     Appearance: Normal appearance.   HENT:      Head: Normocephalic.   Eyes:      Conjunctiva/sclera: Conjunctivae normal.      Pupils: Pupils are equal, round, and reactive to light.      Visual Fields: Right eye visual fields normal and left eye visual fields normal.   Neck:      Trachea: Trachea normal.   Cardiovascular:      Rate and Rhythm: Normal rate and regular rhythm.      Heart sounds: Normal heart sounds.   Pulmonary:      Effort: Pulmonary effort is normal.      Breath sounds: Normal breath sounds and air entry.      Comments: Inspection of chest: normal appearance  Abdominal:      General: Abdomen is flat. Bowel sounds are normal.      Palpations: Abdomen is soft. There is no mass.      Tenderness: There is no guarding.   Musculoskeletal:      Right lower leg: No edema.      Left lower leg: No edema.   Skin:     Findings: No lesion.      Comments: Turgor is normal   Neurological:      Mental Status: She is alert and oriented to person, place, and time.   Psychiatric:         Mood and Affect: Mood and affect normal.              Assessment & Plan          Assessment and Plan    Diagnoses and all orders for this visit:    1. " Gastroesophageal reflux disease, unspecified whether esophagitis present (Primary)    2. Hematochezia    3. Altered bowel habits    4. Constipation, unspecified constipation type    5. Diarrhea, unspecified type    6. Family history of colon cancer    Other orders  -     pantoprazole (PROTONIX) 40 MG EC tablet; Take 1 tablet by mouth Daily.  Dispense: 90 tablet; Refill: 1  -     hyoscyamine (ANASPAZ,LEVSIN) 0.125 MG tablet; Take 1 tablet by mouth Every 4 (Four) Hours As Needed for Cramping.  Dispense: 90 tablet; Refill: 1    76-year-old female presents office today as a new patient with a history of altered bowel habits, constipation, diarrhea, hematochezia, family history of colon cancer and reflux.  Patient's family history of colon cancer, altered bowel habits, reflux and hematochezia I have recommended that the patient undergo further evaluation with a colonoscopy.  I have discussed this procedure in detail with the patient.  I have discussed the risks, benefits and alternatives.  I have discussed the risk of anesthesia, bleeding and perforation.  Patient understands these risks, benefits and alternatives and wishes to proceed.  I will schedule her at her earliest convenience.  I have prescribed Protonix as this appears to be working well for her reflux.  I have also prescribed Levsin for the intermittent abdominal pain.  Patient will follow up in the office after endoscopy.  Patient agreeable to this plan will call with any questions or concerns.          Follow Up       Follow Up   Return for Follow up after endoscopy in office.  Patient was given instructions and counseling regarding her condition or for health maintenance advice. Please see specific information pulled into the AVS if appropriate.

## 2021-10-14 ENCOUNTER — PREP FOR SURGERY (OUTPATIENT)
Dept: OTHER | Facility: HOSPITAL | Age: 76
End: 2021-10-14

## 2021-10-14 ENCOUNTER — OFFICE VISIT (OUTPATIENT)
Dept: GASTROENTEROLOGY | Facility: CLINIC | Age: 76
End: 2021-10-14

## 2021-10-14 VITALS
SYSTOLIC BLOOD PRESSURE: 152 MMHG | DIASTOLIC BLOOD PRESSURE: 63 MMHG | BODY MASS INDEX: 42.72 KG/M2 | WEIGHT: 250.2 LBS | HEART RATE: 70 BPM | HEIGHT: 64 IN | OXYGEN SATURATION: 99 %

## 2021-10-14 DIAGNOSIS — R19.7 DIARRHEA, UNSPECIFIED TYPE: ICD-10-CM

## 2021-10-14 DIAGNOSIS — K92.1 HEMATOCHEZIA: ICD-10-CM

## 2021-10-14 DIAGNOSIS — R19.4 ALTERED BOWEL HABITS: ICD-10-CM

## 2021-10-14 DIAGNOSIS — K59.00 CONSTIPATION, UNSPECIFIED CONSTIPATION TYPE: ICD-10-CM

## 2021-10-14 DIAGNOSIS — Z80.0 FAMILY HISTORY OF COLON CANCER: ICD-10-CM

## 2021-10-14 DIAGNOSIS — K21.9 GASTROESOPHAGEAL REFLUX DISEASE, UNSPECIFIED WHETHER ESOPHAGITIS PRESENT: Primary | ICD-10-CM

## 2021-10-14 PROCEDURE — 99204 OFFICE O/P NEW MOD 45 MIN: CPT | Performed by: NURSE PRACTITIONER

## 2021-10-14 RX ORDER — PANTOPRAZOLE SODIUM 40 MG/1
40 TABLET, DELAYED RELEASE ORAL DAILY
Qty: 90 TABLET | Refills: 1 | Status: SHIPPED | OUTPATIENT
Start: 2021-10-14 | End: 2022-03-22

## 2021-10-14 RX ORDER — HYOSCYAMINE SULFATE 0.125 MG
0.12 TABLET ORAL EVERY 4 HOURS PRN
Qty: 90 TABLET | Refills: 1 | Status: SHIPPED | OUTPATIENT
Start: 2021-10-14 | End: 2021-12-17

## 2021-12-07 RX ORDER — OLMESARTAN MEDOXOMIL AND HYDROCHLOROTHIAZIDE 20/12.5 20; 12.5 MG/1; MG/1
TABLET ORAL
Qty: 30 TABLET | Refills: 5 | Status: SHIPPED | OUTPATIENT
Start: 2021-12-07 | End: 2021-12-10 | Stop reason: ALTCHOICE

## 2021-12-10 ENCOUNTER — HOSPITAL ENCOUNTER (OUTPATIENT)
Dept: CT IMAGING | Facility: HOSPITAL | Age: 76
Discharge: HOME | End: 2021-12-10

## 2021-12-10 ENCOUNTER — TELEPHONE (OUTPATIENT)
Dept: INTERNAL MEDICINE | Facility: CLINIC | Age: 76
End: 2021-12-10

## 2021-12-10 ENCOUNTER — OFFICE VISIT (OUTPATIENT)
Dept: INTERNAL MEDICINE | Facility: CLINIC | Age: 76
End: 2021-12-10

## 2021-12-10 VITALS
HEIGHT: 64 IN | WEIGHT: 250.2 LBS | HEART RATE: 81 BPM | RESPIRATION RATE: 18 BRPM | DIASTOLIC BLOOD PRESSURE: 60 MMHG | BODY MASS INDEX: 42.72 KG/M2 | OXYGEN SATURATION: 95 % | SYSTOLIC BLOOD PRESSURE: 108 MMHG | TEMPERATURE: 97.1 F

## 2021-12-10 DIAGNOSIS — I48.91 ATRIAL FIBRILLATION, UNSPECIFIED TYPE (HCC): Primary | ICD-10-CM

## 2021-12-10 DIAGNOSIS — J81.0 ACUTE PULMONARY EDEMA (HCC): ICD-10-CM

## 2021-12-10 DIAGNOSIS — R06.02 SHORTNESS OF BREATH: ICD-10-CM

## 2021-12-10 DIAGNOSIS — I50.31 ACUTE DIASTOLIC CHF (CONGESTIVE HEART FAILURE) (HCC): ICD-10-CM

## 2021-12-10 LAB — CREAT BLDA-MCNC: 1.2 MG/DL

## 2021-12-10 PROCEDURE — 99214 OFFICE O/P EST MOD 30 MIN: CPT

## 2021-12-10 PROCEDURE — 71260 CT THORAX DX C+: CPT

## 2021-12-10 PROCEDURE — 0 IOPAMIDOL PER 1 ML

## 2021-12-10 PROCEDURE — 82565 ASSAY OF CREATININE: CPT

## 2021-12-10 RX ORDER — DIGOXIN 125 MCG
125 TABLET ORAL
Qty: 30 TABLET | Refills: 0 | Status: SHIPPED | OUTPATIENT
Start: 2021-12-10 | End: 2022-03-22

## 2021-12-10 RX ORDER — AMIODARONE HYDROCHLORIDE 100 MG/1
100 TABLET ORAL DAILY
COMMUNITY
Start: 2021-12-08 | End: 2021-12-10

## 2021-12-10 RX ORDER — METOPROLOL SUCCINATE 50 MG/1
50 TABLET, EXTENDED RELEASE ORAL 2 TIMES DAILY
COMMUNITY
Start: 2021-12-08 | End: 2021-12-10 | Stop reason: ALTCHOICE

## 2021-12-10 RX ORDER — OLMESARTAN MEDOXOMIL AND HYDROCHLOROTHIAZIDE 20/12.5 20; 12.5 MG/1; MG/1
0.5 TABLET ORAL DAILY
Qty: 30 TABLET | Refills: 1 | Status: CANCELLED | OUTPATIENT
Start: 2021-12-10

## 2021-12-10 RX ADMIN — IOPAMIDOL 100 ML: 755 INJECTION, SOLUTION INTRAVENOUS at 13:54

## 2021-12-10 NOTE — PROGRESS NOTES
"Chief Complaint  Hospital Follow Up Visit (needs cardiologist.) and Shortness of Breath (patients son believes she is having anxiety.)    Subjective          History of Present Illness  Bella Brandt presents to [facility]  Hospital follow up from [place]. She went to the ED because she was SOA. She reports that she was in there observation for a night. She was newly diagnosed with Atrial Fib and started on metoprolol 50 mg BID.She reports she was not given anticoagulants because of upcoming colonoscopy.     Right now she is feeling okay. She does get a little short of breath and palpitations with exertion.     Denies chest pain, fevers, chills or body aches.    Chest x-ray was done in ED.    Anxiety-She is anxious with new diagnosis.      ER records reviewed.    Objective   Vital Signs:   /60 (BP Location: Right arm, Patient Position: Sitting, Cuff Size: Large Adult)   Pulse 81   Temp 97.1 °F (36.2 °C) (Temporal)   Resp 18   Ht 163 cm (64.17\")   Wt 113 kg (250 lb 3.2 oz)   SpO2 95%   BMI 42.72 kg/m²     Physical Exam  Constitutional:       Appearance: She is obese.   Eyes:      Extraocular Movements: Extraocular movements intact.      Conjunctiva/sclera: Conjunctivae normal.   Cardiovascular:      Rate and Rhythm: Rhythm irregular.      Heart sounds: Murmur heard.       Pulmonary:      Effort: Pulmonary effort is normal. No respiratory distress.      Breath sounds: Normal breath sounds. No stridor. No wheezing, rhonchi or rales.   Abdominal:      General: Bowel sounds are normal.      Palpations: Abdomen is soft.   Musculoskeletal:         General: Normal range of motion.      Cervical back: Normal range of motion.      Right lower leg: No edema.      Left lower leg: No edema.   Skin:     General: Skin is warm and dry.   Neurological:      Mental Status: She is alert and oriented to person, place, and time.   Psychiatric:         Mood and Affect: Mood normal.    "      Behavior: Behavior normal.         Thought Content: Thought content normal.         Judgment: Judgment normal.        Result Review :                 Assessment and Plan    Diagnoses and all orders for this visit:    1. Atrial fibrillation, unspecified type (HCC) (Primary)  Assessment & Plan:  Patient was seen in the emergency department earlier this week with a new onset of A. fib.  Her rate was uncontrolled.  She states that she was kept overnight.  This is at [facility].  Per discharge instruction she was started on amiodarone and Toprol XL 50 mg twice daily.  Patient has only started the Toprol XL 50 mg twice daily.  She does still admit to some shortness of breath with exertion as well as palpitations.    Heart sounds irregular to auscultation.  Rate at rest was 80.  She is anxious about her new diagnosis.  I also did educate her on new diagnosis including the risks.  I did do an EKG. A. fib with rate of 95.  I discussed with Dr. Uribe.  I will start patient on digoxin 0.125 mg.  I will cut back on her Toprol to 25 mg twice daily starting on Sunday, cont current dose until then.   Stat referral to cardiology.  Patient has appointment on Tuesday.  If patient develops any chest pain, worsening shortness of breath or worsening symptoms she is to go to the emergency department.  Patient is also not on any anticoagulant because she is apparently having blood in her stools, colonoscopy scheduled next week.  Patient to start aspirin at least until we discuss with cardiology.  [facility] consult for assisting     Orders:  -     Ambulatory Referral to Cardiology    2. Shortness of breath  Assessment & Plan:  Patient examined shortness of breath.  She is not on any anticoagulants at this time.  Stat CT PE protocol    Orders:  -     CT Chest With Contrast; Future    Other orders  -     digoxin (Lanoxin) 125 MCG tablet; Take 1 tablet by mouth Daily.  Dispense: 30 tablet; Refill: 0  -     metoprolol tartrate  (LOPRESSOR) 25 MG tablet; Take 1 tablet by mouth 2 (Two) Times a Day.  Dispense: 60 tablet; Refill: 0    Follow Up   No follow-ups on file.  Patient was given instructions and counseling regarding her condition or for health maintenance advice. Please see specific information pulled into the AVS if appropriate.

## 2021-12-10 NOTE — ASSESSMENT & PLAN NOTE
Patient was seen in the emergency department earlier this week with a new onset of A. fib.  Her rate was uncontrolled.  She states that she was kept overnight.  This is at [facility].  Per discharge instruction she was started on amiodarone and Toprol XL 50 mg twice daily.  Patient has only started the Toprol XL 50 mg twice daily.  She does still admit to some shortness of breath with exertion as well as palpitations.    Heart sounds irregular to auscultation.  Rate at rest was 80.  She is anxious about her new diagnosis.  I also did educate her on new diagnosis including the risks.  I did do an EKG. A. fib with rate of 95.  I discussed with Dr. Uribe.  I will start patient on digoxin 0.125 mg.  I will cut back on her Toprol to 25 mg twice daily starting on Sunday, cont current dose until then.   Stat referral to cardiology.  Patient has appointment on Tuesday.  If patient develops any chest pain, worsening shortness of breath or worsening symptoms she is to go to the emergency department.  Patient is also not on any anticoagulant because she is apparently having blood in her stools, colonoscopy scheduled next week.  Patient to start aspirin at least until we discuss with cardiology.  [facility] consult for assisting

## 2021-12-10 NOTE — TELEPHONE ENCOUNTER
Spoke with [facility]. Patient doesn't need a PA for a STAT CT of the Chest to rule out pulmonary embolism. Reference #: [id number] per Jie FELIZ

## 2021-12-10 NOTE — ASSESSMENT & PLAN NOTE
Patient examined shortness of breath.  She is not on any anticoagulants at this time.  Stat CT PE protocol

## 2021-12-13 ENCOUNTER — REFERRAL TRIAGE (OUTPATIENT)
Dept: CASE MANAGEMENT | Facility: OTHER | Age: 76
End: 2021-12-13

## 2021-12-13 ENCOUNTER — PATIENT OUTREACH (OUTPATIENT)
Dept: CASE MANAGEMENT | Facility: OTHER | Age: 76
End: 2021-12-13

## 2021-12-13 DIAGNOSIS — R06.02 SHORTNESS OF BREATH: ICD-10-CM

## 2021-12-13 DIAGNOSIS — I48.91 ATRIAL FIBRILLATION, UNSPECIFIED TYPE (HCC): Primary | ICD-10-CM

## 2021-12-13 DIAGNOSIS — Z79.899 MEDICATION MANAGEMENT: ICD-10-CM

## 2021-12-13 NOTE — OUTREACH NOTE
Ambulatory Case Management Note    General & Health Literacy Assessment    Questions/Answers      Most Recent Value   Assessment Completed With Patient   Living Arrangement Spouse   Type of Residence Private Residence   Home Care Services Yes   Communication Device No   Bed or Wheelchair Confined No   Difficulty Keeping Appointments No   Zoroastrian or Spiritual Beliefs that Impact Treatment No   Chronic Pain No   How often do you have someone help you read hospital materials? Never   How often do you have problems learning about your medical condition because of difficulty understanding written information? Never   How often do you have a problem understanding what is told to you about your medical condition? Never   How confident are you filling out medical forms by yourself? Extremely   Health Literacy Good      CCM Interim Update        Patient was called this date and the CCM/ HRCM  service was explained to the patient in full all questions were answered . It was decided that the HRCM program would better fit the patients current needs. Patient gave consent .     Patients most recent diagnosis of Afib was discussed the patient stated she had at least 2 episodes in the past 2 days of SOA and weakness . She state that he weak feeling was relieved with rest . Patient did not seek medical help for the issue. I stated that if she should have another issue such as this too call [id number] the seriousness of the situation was explained and the patient verbalized understanding. The patient is aware of her appointment with Cardio next date at 1230 EST to arrive 15 min before appointment. Patient scheduled for Ishan  12-17-21 patient was informed to follow up with Cardio as to wether or not to proceed with Ishan patient verbalized understanding. PCP made request for follow up visit from home health . Orders were written and and routed to PCP for signature. [facility] has been advised. Patient will be notified of home  health orders.     Patient scheduled for HRCM call 12-15-21          There are no recently modified care plans to display for this patient.      Alfredo Rdz MA  Ambulatory Case Management    12/13/2021, 14:47 EST

## 2021-12-14 ENCOUNTER — OFFICE VISIT (OUTPATIENT)
Dept: CARDIOLOGY | Facility: CLINIC | Age: 76
End: 2021-12-14

## 2021-12-14 VITALS
DIASTOLIC BLOOD PRESSURE: 70 MMHG | HEIGHT: 64 IN | BODY MASS INDEX: 43.87 KG/M2 | HEART RATE: 94 BPM | SYSTOLIC BLOOD PRESSURE: 102 MMHG | WEIGHT: 257 LBS

## 2021-12-14 DIAGNOSIS — I10 HYPERTENSION, ESSENTIAL: ICD-10-CM

## 2021-12-14 DIAGNOSIS — I25.10 ATHEROSCLEROSIS OF NATIVE CORONARY ARTERY OF NATIVE HEART WITHOUT ANGINA PECTORIS: ICD-10-CM

## 2021-12-14 DIAGNOSIS — R94.31 ABNORMAL EKG: ICD-10-CM

## 2021-12-14 DIAGNOSIS — I48.19 PERSISTENT ATRIAL FIBRILLATION (HCC): Primary | ICD-10-CM

## 2021-12-14 DIAGNOSIS — I25.10 CORONARY ARTERY DISEASE INVOLVING NATIVE CORONARY ARTERY OF NATIVE HEART WITHOUT ANGINA PECTORIS: ICD-10-CM

## 2021-12-14 PROCEDURE — 99204 OFFICE O/P NEW MOD 45 MIN: CPT | Performed by: SPECIALIST

## 2021-12-14 PROCEDURE — 93000 ELECTROCARDIOGRAM COMPLETE: CPT | Performed by: SPECIALIST

## 2021-12-14 NOTE — PROGRESS NOTES
[facility]   Cardiology Consult Note    Patient Name: Bella Brandt  : 1945  Referring Physician: No ref. provider found  Subjective   Subjective     Reason for Consult/ Chief Complaint:   Chief Complaint   Patient presents with   • Shortness of Breath   • Atrial Fibrillation       HPI:  Bella Brandt is a 76 y.o. female with increased shortness of breath for the last few weeks.  Mostly on exertion relieved by rest.  noticed to be atrial fibrillation.  Started on her metoprolol.  Shortness of breath is improved.  No chest pain.    Review of Systems:   Constitutional no fever,  no weight loss   Skin no rash   Otolaryngeal no difficulty swallowing   Cardiovascular See HPI   Pulmonary no cough, no sputum production   Gastrointestinal no constipation, no diarrhea   Genitourinary no dysuria, no hematuria   Hematologic no easy bruisability, no abnormal bleeding   Musculoskeletal no muscle pain   Neurologic no dizziness, no falls     Personal History     Past Medical History:  Past Medical History:   Diagnosis Date   • Allergies    • Atrial fibrillation (HCC)    • Back pain    • Carotid bruit    • Chronic fatigue, unspecified    • Depression    • Depression    • Essential (primary) hypertension .   • GERD without esophagitis    • Heart failure, unspecified (HCC)    • Hypercholesterolemia with LDL greater than 190 mg/dL    • Hypothyroidism    • Syncope and collapse    • Type 2 diabetes mellitus without complication (HCC)    • Vitamin D deficiency, unspecified        Family History:   Family History   Problem Relation Age of Onset   • Heart attack Sister    • Colon cancer Sister    • Colon cancer Brother        Social History:  reports that she quit smoking about 54 years ago. Her smoking use included cigarettes. She has never used smokeless tobacco. She reports previous alcohol use. She reports that she does not use drugs.    Home Medications:  Magnesium, digoxin, hyoscyamine, metoprolol tartrate, and  pantoprazole    Allergies:  Allergies   Allergen Reactions   • Nsaids Unknown - Low Severity       Objective    Objective     Vitals:   Heart Rate:  [] 94  BP: (102-136)/(70-96) 102/70  Body mass index is 44.11 kg/m².  Physical Exam:   Constitutional: Awake, alert, No acute distress    Eyes: PERRLA, sclerae anicteric, no conjunctival injection   HENT: NCAT, mucous membranes moist   Neck: Supple, no thyromegaly, no lymphadenopathy, trachea midline   Respiratory: Clear to auscultation bilaterally, nonlabored respirations    Cardiovascular: RRR, no murmurs or rubs. Palpable pedal pulses bilaterally   Gastrointestinal: Positive bowel sounds, soft, nontender, nondistended   Musculoskeletal: No bilateral ankle edema, no clubbing or cyanosis to extremities   Psychiatric: Appropriate affect, cooperative   Neurologic: Oriented x 3, strength symmetric in all extremities, Cranial Nerves grossly intact to confrontation, speech clear   Skin: No rashes     Result Review    Result Review:  I have personally reviewed the available results:  [x]  Laboratory  [x]  EKG/Telemetry   [x]  Cardiology/Vascular   [x] Medications  [x]  Old records  Lab Results   Component Value Date    CHOL 150 09/03/2021     Lab Results   Component Value Date    TRIG 137 09/03/2021    TRIG 122 06/24/2020     Lab Results   Component Value Date    HDL 41 09/03/2021    HDL 48 06/24/2020     Lab Results   Component Value Date    LDL 85 09/03/2021    LDL 73 06/24/2020     Lab Results   Component Value Date    VLDL 24 09/03/2021    VLDL 24 06/24/2020     *      ECG 12 Lead    Date/Time: 12/14/2021 1:39 PM  Performed by: Byron Lopez MD  Authorized by: Byron Lopez MD   Rhythm: atrial fibrillation  Conduction: non-specific intraventricular conduction delay  Other findings: non-specific ST-T wave changes    Clinical impression: abnormal EKG  Comments: Atrial fibrillation with a controlled heart rate.  Antral MI age-indeterminate.   Intraventricular conduction delay             Impression/Plan  1.  Persistent atrial fibrillation with a controlled heart rate.  Continue digoxin 125 mcg once a day.  Continue Toprol-XL 25 mg twice a day.  Start Eliquis 5 mg twice daily for stroke prevention.  All risk benefits of anticoagulation discussed with patient.  2.  Shortness of breath/abnormal EKG: Echocardiogram to evaluate left ventricular systolic function and any significant valvular abnormalities.  Sestamibi stress test to evaluate for any significant ischemia.  Start Lasix 20 mg once a day after echocardiogram if still short of breath        Electronically signed by Byron Lopez MD, 12/14/21, 12:56 PM EST.

## 2021-12-17 ENCOUNTER — APPOINTMENT (OUTPATIENT)
Dept: CARDIOLOGY | Facility: HOSPITAL | Age: 76
End: 2021-12-17

## 2021-12-17 ENCOUNTER — HOSPITAL ENCOUNTER (INPATIENT)
Facility: HOSPITAL | Age: 76
LOS: 4 days | Discharge: HOME | End: 2021-12-21
Attending: EMERGENCY MEDICINE | Admitting: INTERNAL MEDICINE

## 2021-12-17 ENCOUNTER — APPOINTMENT (OUTPATIENT)
Dept: CT IMAGING | Facility: HOSPITAL | Age: 76
End: 2021-12-17

## 2021-12-17 ENCOUNTER — PATIENT OUTREACH (OUTPATIENT)
Dept: CASE MANAGEMENT | Facility: OTHER | Age: 76
End: 2021-12-17

## 2021-12-17 ENCOUNTER — APPOINTMENT (OUTPATIENT)
Dept: GENERAL RADIOLOGY | Facility: HOSPITAL | Age: 76
End: 2021-12-17

## 2021-12-17 DIAGNOSIS — R19.4 ALTERED BOWEL HABITS: Primary | ICD-10-CM

## 2021-12-17 DIAGNOSIS — K57.32 DIVERTICULITIS OF LARGE INTESTINE WITHOUT PERFORATION OR ABSCESS WITHOUT BLEEDING: ICD-10-CM

## 2021-12-17 DIAGNOSIS — I48.91 ATRIAL FIBRILLATION, UNSPECIFIED TYPE (HCC): Primary | ICD-10-CM

## 2021-12-17 DIAGNOSIS — I50.21 ACUTE SYSTOLIC CONGESTIVE HEART FAILURE (HCC): ICD-10-CM

## 2021-12-17 DIAGNOSIS — R06.02 SHORTNESS OF BREATH ON EXERTION: ICD-10-CM

## 2021-12-17 DIAGNOSIS — R06.02 SHORTNESS OF BREATH: ICD-10-CM

## 2021-12-17 PROBLEM — I50.9 ACUTE EXACERBATION OF CHF (CONGESTIVE HEART FAILURE): Status: ACTIVE | Noted: 2021-12-17

## 2021-12-17 LAB
ALBUMIN SERPL-MCNC: 4 G/DL (ref 3.5–5.2)
ALBUMIN/GLOB SERPL: 1.5 G/DL
ALP SERPL-CCNC: 58 U/L (ref 39–117)
ALT SERPL W P-5'-P-CCNC: 23 U/L (ref 1–33)
ANION GAP SERPL CALCULATED.3IONS-SCNC: 13.2 MMOL/L (ref 5–15)
AST SERPL-CCNC: 20 U/L (ref 1–32)
BASOPHILS # BLD AUTO: 0.03 10*3/MM3 (ref 0–0.2)
BASOPHILS NFR BLD AUTO: 0.4 % (ref 0–1.5)
BILIRUB SERPL-MCNC: 0.9 MG/DL (ref 0–1.2)
BUN SERPL-MCNC: 13 MG/DL (ref 8–23)
BUN/CREAT SERPL: 13.1 (ref 7–25)
CALCIUM SPEC-SCNC: 9.7 MG/DL (ref 8.6–10.5)
CHLORIDE SERPL-SCNC: 103 MMOL/L (ref 98–107)
CO2 SERPL-SCNC: 23.8 MMOL/L (ref 22–29)
CREAT SERPL-MCNC: 0.99 MG/DL (ref 0.57–1)
D DIMER PPP FEU-MCNC: 1.13 MG/L (FEU) (ref 0–0.59)
DEPRECATED RDW RBC AUTO: 52.4 FL (ref 37–54)
EOSINOPHIL # BLD AUTO: 0.05 10*3/MM3 (ref 0–0.4)
EOSINOPHIL NFR BLD AUTO: 0.6 % (ref 0.3–6.2)
ERYTHROCYTE [DISTWIDTH] IN BLOOD BY AUTOMATED COUNT: 14.8 % (ref 12.3–15.4)
GFR SERPL CREATININE-BSD FRML MDRD: 54 ML/MIN/1.73
GLOBULIN UR ELPH-MCNC: 2.6 GM/DL
GLUCOSE SERPL-MCNC: 106 MG/DL (ref 65–99)
HCT VFR BLD AUTO: 37.1 % (ref 34–46.6)
HGB BLD-MCNC: 12.6 G/DL (ref 12–15.9)
HOLD SPECIMEN: NORMAL
HOLD SPECIMEN: NORMAL
IMM GRANULOCYTES # BLD AUTO: 0.02 10*3/MM3 (ref 0–0.05)
IMM GRANULOCYTES NFR BLD AUTO: 0.2 % (ref 0–0.5)
LYMPHOCYTES # BLD AUTO: 1.12 10*3/MM3 (ref 0.7–3.1)
LYMPHOCYTES NFR BLD AUTO: 13.7 % (ref 19.6–45.3)
MCH RBC QN AUTO: 32.7 PG (ref 26.6–33)
MCHC RBC AUTO-ENTMCNC: 34 G/DL (ref 31.5–35.7)
MCV RBC AUTO: 96.4 FL (ref 79–97)
MONOCYTES # BLD AUTO: 0.69 10*3/MM3 (ref 0.1–0.9)
MONOCYTES NFR BLD AUTO: 8.4 % (ref 5–12)
NEUTROPHILS NFR BLD AUTO: 6.27 10*3/MM3 (ref 1.7–7)
NEUTROPHILS NFR BLD AUTO: 76.7 % (ref 42.7–76)
NRBC BLD AUTO-RTO: 0 /100 WBC (ref 0–0.2)
NT-PROBNP SERPL-MCNC: 3214 PG/ML (ref 0–1800)
PLATELET # BLD AUTO: 201 10*3/MM3 (ref 140–450)
PMV BLD AUTO: 10.4 FL (ref 6–12)
POTASSIUM SERPL-SCNC: 4.4 MMOL/L (ref 3.5–5.2)
PROT SERPL-MCNC: 6.6 G/DL (ref 6–8.5)
RBC # BLD AUTO: 3.85 10*6/MM3 (ref 3.77–5.28)
SARS-COV-2 RNA RESP QL NAA+PROBE: NOT DETECTED
SODIUM SERPL-SCNC: 140 MMOL/L (ref 136–145)
TROPONIN T SERPL-MCNC: <0.01 NG/ML (ref 0–0.03)
WBC NRBC COR # BLD: 8.18 10*3/MM3 (ref 3.4–10.8)
WHOLE BLOOD HOLD SPECIMEN: NORMAL
WHOLE BLOOD HOLD SPECIMEN: NORMAL

## 2021-12-17 PROCEDURE — 94799 UNLISTED PULMONARY SVC/PX: CPT

## 2021-12-17 PROCEDURE — 25010000002 FUROSEMIDE PER 20 MG: Performed by: PHYSICIAN ASSISTANT

## 2021-12-17 PROCEDURE — 36415 COLL VENOUS BLD VENIPUNCTURE: CPT

## 2021-12-17 PROCEDURE — 93306 TTE W/DOPPLER COMPLETE: CPT

## 2021-12-17 PROCEDURE — 99223 1ST HOSP IP/OBS HIGH 75: CPT | Performed by: INTERNAL MEDICINE

## 2021-12-17 PROCEDURE — 93306 TTE W/DOPPLER COMPLETE: CPT | Performed by: INTERNAL MEDICINE

## 2021-12-17 PROCEDURE — 25010000002 FUROSEMIDE PER 20 MG: Performed by: INTERNAL MEDICINE

## 2021-12-17 PROCEDURE — U0003 INFECTIOUS AGENT DETECTION BY NUCLEIC ACID (DNA OR RNA); SEVERE ACUTE RESPIRATORY SYNDROME CORONAVIRUS 2 (SARS-COV-2) (CORONAVIRUS DISEASE [COVID-19]), AMPLIFIED PROBE TECHNIQUE, MAKING USE OF HIGH THROUGHPUT TECHNOLOGIES AS DESCRIBED BY CMS-2020-01-R: HCPCS | Performed by: PHYSICIAN ASSISTANT

## 2021-12-17 PROCEDURE — 85379 FIBRIN DEGRADATION QUANT: CPT | Performed by: EMERGENCY MEDICINE

## 2021-12-17 PROCEDURE — 25010000002 SULFUR HEXAFLUORIDE MICROSPH 60.7-25 MG RECONSTITUTED SUSPENSION: Performed by: INTERNAL MEDICINE

## 2021-12-17 PROCEDURE — 93010 ELECTROCARDIOGRAM REPORT: CPT | Performed by: INTERNAL MEDICINE

## 2021-12-17 PROCEDURE — 85025 COMPLETE CBC W/AUTO DIFF WBC: CPT | Performed by: EMERGENCY MEDICINE

## 2021-12-17 PROCEDURE — 83880 ASSAY OF NATRIURETIC PEPTIDE: CPT | Performed by: EMERGENCY MEDICINE

## 2021-12-17 PROCEDURE — 84484 ASSAY OF TROPONIN QUANT: CPT | Performed by: EMERGENCY MEDICINE

## 2021-12-17 PROCEDURE — 99284 EMERGENCY DEPT VISIT MOD MDM: CPT

## 2021-12-17 PROCEDURE — 0 IOPAMIDOL PER 1 ML: Performed by: EMERGENCY MEDICINE

## 2021-12-17 PROCEDURE — 80053 COMPREHEN METABOLIC PANEL: CPT | Performed by: EMERGENCY MEDICINE

## 2021-12-17 PROCEDURE — 71045 X-RAY EXAM CHEST 1 VIEW: CPT

## 2021-12-17 PROCEDURE — 71275 CT ANGIOGRAPHY CHEST: CPT

## 2021-12-17 PROCEDURE — 94640 AIRWAY INHALATION TREATMENT: CPT

## 2021-12-17 PROCEDURE — 93005 ELECTROCARDIOGRAM TRACING: CPT | Performed by: EMERGENCY MEDICINE

## 2021-12-17 RX ORDER — ALUMINA, MAGNESIA, AND SIMETHICONE 2400; 2400; 240 MG/30ML; MG/30ML; MG/30ML
15 SUSPENSION ORAL EVERY 6 HOURS PRN
Status: DISCONTINUED | OUTPATIENT
Start: 2021-12-17 | End: 2021-12-21 | Stop reason: HOSPADM

## 2021-12-17 RX ORDER — FUROSEMIDE 10 MG/ML
40 INJECTION INTRAMUSCULAR; INTRAVENOUS ONCE
Status: COMPLETED | OUTPATIENT
Start: 2021-12-17 | End: 2021-12-17

## 2021-12-17 RX ORDER — BISACODYL 5 MG/1
5 TABLET, DELAYED RELEASE ORAL DAILY PRN
Status: DISCONTINUED | OUTPATIENT
Start: 2021-12-17 | End: 2021-12-21 | Stop reason: HOSPADM

## 2021-12-17 RX ORDER — POLYETHYLENE GLYCOL 3350 17 G/17G
17 POWDER, FOR SOLUTION ORAL DAILY PRN
Status: DISCONTINUED | OUTPATIENT
Start: 2021-12-17 | End: 2021-12-21 | Stop reason: HOSPADM

## 2021-12-17 RX ORDER — BISACODYL 10 MG
10 SUPPOSITORY, RECTAL RECTAL DAILY PRN
Status: DISCONTINUED | OUTPATIENT
Start: 2021-12-17 | End: 2021-12-21 | Stop reason: HOSPADM

## 2021-12-17 RX ORDER — NITROGLYCERIN 0.4 MG/1
0.4 TABLET SUBLINGUAL
Status: DISCONTINUED | OUTPATIENT
Start: 2021-12-17 | End: 2021-12-21 | Stop reason: HOSPADM

## 2021-12-17 RX ORDER — ONDANSETRON 2 MG/ML
4 INJECTION INTRAMUSCULAR; INTRAVENOUS EVERY 4 HOURS PRN
Status: DISCONTINUED | OUTPATIENT
Start: 2021-12-17 | End: 2021-12-21 | Stop reason: HOSPADM

## 2021-12-17 RX ORDER — CALCIUM CARBONATE 200(500)MG
2 TABLET,CHEWABLE ORAL 3 TIMES DAILY PRN
Status: DISCONTINUED | OUTPATIENT
Start: 2021-12-17 | End: 2021-12-21 | Stop reason: HOSPADM

## 2021-12-17 RX ORDER — IPRATROPIUM BROMIDE AND ALBUTEROL SULFATE 2.5; .5 MG/3ML; MG/3ML
3 SOLUTION RESPIRATORY (INHALATION) ONCE
Status: COMPLETED | OUTPATIENT
Start: 2021-12-17 | End: 2021-12-17

## 2021-12-17 RX ORDER — ACETAMINOPHEN 325 MG/1
650 TABLET ORAL EVERY 4 HOURS PRN
Status: DISCONTINUED | OUTPATIENT
Start: 2021-12-17 | End: 2021-12-21 | Stop reason: HOSPADM

## 2021-12-17 RX ORDER — SODIUM CHLORIDE 0.9 % (FLUSH) 0.9 %
10 SYRINGE (ML) INJECTION EVERY 12 HOURS SCHEDULED
Status: DISCONTINUED | OUTPATIENT
Start: 2021-12-17 | End: 2021-12-21 | Stop reason: HOSPADM

## 2021-12-17 RX ORDER — ACETAMINOPHEN 160 MG/5ML
650 SOLUTION ORAL EVERY 4 HOURS PRN
Status: DISCONTINUED | OUTPATIENT
Start: 2021-12-17 | End: 2021-12-21 | Stop reason: HOSPADM

## 2021-12-17 RX ORDER — PANTOPRAZOLE SODIUM 40 MG/1
40 TABLET, DELAYED RELEASE ORAL DAILY
Status: DISCONTINUED | OUTPATIENT
Start: 2021-12-17 | End: 2021-12-21 | Stop reason: HOSPADM

## 2021-12-17 RX ORDER — SODIUM CHLORIDE 0.9 % (FLUSH) 0.9 %
10 SYRINGE (ML) INJECTION AS NEEDED
Status: DISCONTINUED | OUTPATIENT
Start: 2021-12-17 | End: 2021-12-21 | Stop reason: HOSPADM

## 2021-12-17 RX ORDER — FUROSEMIDE 10 MG/ML
40 INJECTION INTRAMUSCULAR; INTRAVENOUS
Status: DISCONTINUED | OUTPATIENT
Start: 2021-12-17 | End: 2021-12-21 | Stop reason: HOSPADM

## 2021-12-17 RX ORDER — CHOLECALCIFEROL (VITAMIN D3) 125 MCG
5 CAPSULE ORAL NIGHTLY PRN
Status: DISCONTINUED | OUTPATIENT
Start: 2021-12-17 | End: 2021-12-21 | Stop reason: HOSPADM

## 2021-12-17 RX ORDER — DIGOXIN 125 MCG
125 TABLET ORAL
Status: DISCONTINUED | OUTPATIENT
Start: 2021-12-18 | End: 2021-12-21 | Stop reason: HOSPADM

## 2021-12-17 RX ORDER — ACETAMINOPHEN 650 MG/1
650 SUPPOSITORY RECTAL EVERY 4 HOURS PRN
Status: DISCONTINUED | OUTPATIENT
Start: 2021-12-17 | End: 2021-12-21 | Stop reason: HOSPADM

## 2021-12-17 RX ORDER — AMOXICILLIN 250 MG
1 CAPSULE ORAL 2 TIMES DAILY PRN
Status: DISCONTINUED | OUTPATIENT
Start: 2021-12-17 | End: 2021-12-21 | Stop reason: HOSPADM

## 2021-12-17 RX ORDER — LISINOPRIL 5 MG/1
5 TABLET ORAL
Status: DISCONTINUED | OUTPATIENT
Start: 2021-12-17 | End: 2021-12-21 | Stop reason: HOSPADM

## 2021-12-17 RX ORDER — CARVEDILOL 6.25 MG/1
6.25 TABLET ORAL 2 TIMES DAILY WITH MEALS
Status: DISCONTINUED | OUTPATIENT
Start: 2021-12-17 | End: 2021-12-21 | Stop reason: HOSPADM

## 2021-12-17 RX ORDER — SPIRONOLACTONE 25 MG/1
25 TABLET ORAL DAILY
Status: DISCONTINUED | OUTPATIENT
Start: 2021-12-17 | End: 2021-12-21 | Stop reason: HOSPADM

## 2021-12-17 RX ORDER — SODIUM CHLORIDE 0.9 % (FLUSH) 0.9 %
10 SYRINGE (ML) INJECTION AS NEEDED
Status: DISCONTINUED | OUTPATIENT
Start: 2021-12-17 | End: 2021-12-17

## 2021-12-17 RX ADMIN — APIXABAN 5 MG: 5 TABLET, FILM COATED ORAL at 22:27

## 2021-12-17 RX ADMIN — SODIUM CHLORIDE, PRESERVATIVE FREE 10 ML: 5 INJECTION INTRAVENOUS at 22:28

## 2021-12-17 RX ADMIN — FUROSEMIDE 40 MG: 10 INJECTION, SOLUTION INTRAMUSCULAR; INTRAVENOUS at 15:33

## 2021-12-17 RX ADMIN — IOPAMIDOL 100 ML: 755 INJECTION, SOLUTION INTRAVENOUS at 14:02

## 2021-12-17 RX ADMIN — CARVEDILOL 6.25 MG: 6.25 TABLET, FILM COATED ORAL at 22:27

## 2021-12-17 RX ADMIN — IPRATROPIUM BROMIDE AND ALBUTEROL SULFATE 3 ML: 2.5; .5 SOLUTION RESPIRATORY (INHALATION) at 13:34

## 2021-12-17 RX ADMIN — SULFUR HEXAFLUORIDE 2 ML: KIT at 22:31

## 2021-12-17 RX ADMIN — SPIRONOLACTONE 25 MG: 25 TABLET ORAL at 22:27

## 2021-12-17 RX ADMIN — FUROSEMIDE 40 MG: 10 INJECTION, SOLUTION INTRAMUSCULAR; INTRAVENOUS at 22:27

## 2021-12-17 RX ADMIN — LISINOPRIL 5 MG: 5 TABLET ORAL at 22:27

## 2021-12-17 NOTE — ED PROVIDER NOTES
Subjective   68-year-old female presents to the emergency department with complaints of low blood pressure 76-year-old female presents to the emergency department with increased shortness of breath, especially with exertion even minimal.  Patient reports some symptomatic relief at rest, but states it is gotten worse over the last 2 weeks.  Patient reports being admitted at an outlying facility on 12/7/2021, at which time she was diagnosed with atrial fibrillation with rapid ventricular response, systolic congestive heart failure, and placed on rate control and blood pressure control at that time.  Patient is following Dr. Lopez, in cardiology, for persistent atrial fibrillation, hypertension, and persistent shortness of breath related to congestive heart failure.  Patient reports she was supposed to have echo done, but has not had that done yet.  She reports no other additional questions or concerns.  Resting comfortably, no acute distress.  Vital stable, although patient is requiring 2-1/2 L of oxygen which is new for her.  Patient denies any other symptoms including fevers or chills, recent illnesses or sick contact exposures, chest pain, abdominal pain, bowel or bladder complaints.    After reviewing office visit with cardiology it seems the patient has been started on digoxin and metoprolol for persistent atrial fibrillation with controlled heart rate.  Patient was started on Eliquis twice daily for stroke prevention.  Patient is scheduled for echocardiogram to evaluate left ventricular systolic function and significant valvular abnormalities.  Stress test was also recommended.  Lasix was recommended after echocardiogram was performed.  Patient is not currently taking medications for relief of fluid retention at this time.      History provided by:  Patient   used: No        Review of Systems   Constitutional: Negative for chills and fever.   HENT: Negative for congestion, ear pain and  sore throat.    Eyes: Negative for pain.   Respiratory: Positive for shortness of breath. Negative for cough and chest tightness.    Cardiovascular: Negative for chest pain and leg swelling.   Gastrointestinal: Negative for abdominal pain, diarrhea, nausea and vomiting.   Genitourinary: Negative for flank pain and hematuria.   Musculoskeletal: Negative for joint swelling.   Skin: Negative for pallor.   Neurological: Negative for seizures and headaches.   All other systems reviewed and are negative.      Past Medical History:   Diagnosis Date   • Allergies    • Atrial fibrillation (HCC)    • Back pain    • Carotid bruit    • Chronic fatigue, unspecified    • Depression    • Depression    • Essential (primary) hypertension .   • GERD without esophagitis    • Heart failure, unspecified (HCC)    • Hypercholesterolemia with LDL greater than 190 mg/dL    • Hypothyroidism    • Syncope and collapse    • Type 2 diabetes mellitus without complication (HCC)    • Vitamin D deficiency, unspecified        No Known Allergies    Past Surgical History:   Procedure Laterality Date   • BREAST BIOPSY     • CATARACT EXTRACTION     •  SECTION     • COLONOSCOPY      C-Scope 203 and 208   • HYSTERECTOMY      partial    • MASTECTOMY     • REPLACEMENT TOTAL KNEE Right 2013   • TONSILLECTOMY     • UPPER GASTROINTESTINAL ENDOSCOPY         Family History   Problem Relation Age of Onset   • Heart attack Sister    • Colon cancer Sister    • Colon cancer Brother        Social History     Socioeconomic History   • Marital status:    Tobacco Use   • Smoking status: Former Smoker     Types: Cigarettes     Quit date:      Years since quittin.9   • Smokeless tobacco: Never Used   Vaping Use   • Vaping Use: Never used   Substance and Sexual Activity   • Alcohol use: Not Currently   • Drug use: Never   • Sexual activity: Defer           Objective   Physical Exam  Vitals and nursing note reviewed.   Constitutional:        General: She is not in acute distress.     Appearance: Normal appearance. She is not toxic-appearing.   HENT:      Head: Normocephalic and atraumatic.      Mouth/Throat:      Mouth: Mucous membranes are moist.   Eyes:      General: No scleral icterus.  Cardiovascular:      Rate and Rhythm: Regular rhythm. Tachycardia present.      Pulses: Normal pulses.      Heart sounds: Normal heart sounds.   Pulmonary:      Effort: Pulmonary effort is normal. No tachypnea, accessory muscle usage or respiratory distress.      Breath sounds: Examination of the right-middle field reveals rales. Examination of the left-middle field reveals rales. Examination of the right-lower field reveals rales. Examination of the left-lower field reveals rales. Rales present.   Abdominal:      General: Abdomen is flat.      Palpations: Abdomen is soft.      Tenderness: There is no abdominal tenderness.   Musculoskeletal:         General: Normal range of motion.      Cervical back: Normal range of motion and neck supple.      Right lower leg: No tenderness. No edema.      Left lower leg: No tenderness. No edema.   Skin:     General: Skin is warm and dry.      Capillary Refill: Capillary refill takes less than 2 seconds.   Neurological:      General: No focal deficit present.      Mental Status: She is alert and oriented to person, place, and time. Mental status is at baseline.         Procedures           ED Course                                                 MDM  Number of Diagnoses or Management Options  Diagnosis management comments: Patient was seen and evaluated here in the emergency department for exertional shortness of breath, most likely related to worsening congestive heart failure.  Patient's BNP 3214.  D-dimer elevated, CT PE revealed no acute pulmonary embolisms, but did reveal worsening bilateral pulmonary effusions.  These are most likely affecting patient's respiratory output, which is led her to be placed on 2-1/2 L of  oxygen, of which she was not well before.  Patient resting comfortably, no acute distress.  CMP and CBC unremarkable.  Troponin and EKG were unremarkable.  Patient expressed no other questions or concerns.  Was recommended the patient be admitted for observation, Lasix, and cardiology follow-up based on comorbidities and new diagnosis at this time.  Dr. Chin was consulted, who is admitting patient and his services at this time.  He recommended starting the patient on 40 Lasix here in the emergency department while waiting for a bed.       Amount and/or Complexity of Data Reviewed  Clinical lab tests: reviewed and ordered  Tests in the radiology section of CPT®: reviewed and ordered  Tests in the medicine section of CPT®: reviewed and ordered    Risk of Complications, Morbidity, and/or Mortality  Presenting problems: moderate  Diagnostic procedures: moderate  Management options: moderate    Patient Progress  Patient progress: stable      Final diagnoses:   Acute systolic congestive heart failure (HCC)   Shortness of breath on exertion       ED Disposition  ED Disposition     ED Disposition Condition Comment    Decision to Admit  Level of Care: Telemetry [5]   Diagnosis: Acute exacerbation of CHF (congestive heart failure) (HCC) [[id number]]   Admitting Physician: DEVIN CHIN [[id number]]   Attending Physician: CATRINA LE [[id number]]   Isolate for COVID?: No [0]   Certification: I Certify That Inpatient Hospital Services Are Medically Necessary For Greater Than 2 Midnights            No follow-up provider specified.       Medication List      No changes were made to your prescriptions during this visit.          Sharon Rodríguez PA-C  12/17/21 [id number]

## 2021-12-17 NOTE — OUTREACH NOTE
"Ambulatory Case Management Note      Patients family member \"Mykel \" called thist AM sattting she was still very SOA with minimal exertion.  I asked if the patient would like to be seen today and she agreed. I stated I would talk with ASHLI VANCE  And see when she could be seen. After discussion with RAJIV I also called the [facility] office and talked with Nurse Navigator and both advised that the patient may need to be seen in a emergent care setting to be evaluated . I called the patients family member (Mykel) and advised him that she needs to to to emergent care. He asked where to take the patient , I advised the nearest ED. He verbalized understanding. PCP was advised.     There are no recently modified care plans to display for this patient.      Alfredo Rdz MA  Ambulatory Case Management    12/17/2021, 10:01 EST    "

## 2021-12-17 NOTE — H&P
Patient Name: Bella Brandt Date of Admission: 2021   : 1945 MRN: [id number]   Location: [place] [address] CSN: [id number]       [facility]   HISTORY AND PHYSICAL  DATE: 2021  Primary Care Provider  Taiwo Nazario MD    Subjective   Subjective     Chief Complaint   Shortness of breath.    History of Present Illness  Bella Brandt is a 76 y.o. female with longstanding history of hypertension and mild mitral valve prolapse with mitral regurgitation, last echocardiogram 2021, who was diagnosed with rapid A. fib 2021 at [place] and started on metoprolol, seen in our office 12/10/2021 and started on digoxin.  She was then seen by Dr. Lopez on 2021 and he added Eliquis at that time.  Initially Eliquis was being held because patient was scheduled for colonoscopy with Dr. Calov, I believe that was supposed to be today actually.  Unfortunately patient presents to the ER today in heart failure with elevated BNP and infiltrate/effusions on the chest x-ray.  Her heart rate is reasonable 88- to 114, but she is obviously in need for diuresis and repeat echo as inpatient now instead of the planned outpatient one.  Case reviewed in detail  with the PA in the ER, I agree with her assessment and plan, orders were placed for admission.    Time of evaluation later at evening, patient was up in the chair, no acute distress, but she was on several liters of oxygen.  She reports increasing dyspnea, just with short trips to her bathroom.  She denies any ischemic sounding chest pain.  Reports compliance with her medications.      Personal History     Past Medical History:   Diagnosis Date   • Allergies    • Atrial fibrillation (HCC)    • Back pain    • Carotid bruit    • Chronic fatigue, unspecified    • Depression    • Depression    • Essential (primary) hypertension .   • GERD without esophagitis    • Heart failure, unspecified (HCC)    • Hypercholesterolemia with LDL  greater than 190 mg/dL    • Hypothyroidism    • Syncope and collapse    • Type 2 diabetes mellitus without complication (HCC)    • Vitamin D deficiency, unspecified        Past Surgical History:   Procedure Laterality Date   • BREAST BIOPSY     • CATARACT EXTRACTION     •  SECTION     • COLONOSCOPY      C-Scope 203 and 208   • HYSTERECTOMY      partial    • MASTECTOMY     • REPLACEMENT TOTAL KNEE Right 2013   • TONSILLECTOMY     • UPPER GASTROINTESTINAL ENDOSCOPY         Social History    reports that she quit smoking about 54 years ago. Her smoking use included cigarettes. She has never used smokeless tobacco. She reports previous alcohol use. She reports that she does not use drugs.    Family History  family history includes Colon cancer in her brother and sister; Heart attack in her sister. Otherwise pertinent FHx was reviewed and not pertinent to current issue.    Allergies  No Known Allergies    Home Medications  Magnesium, apixaban, digoxin, metoprolol tartrate, and pantoprazole    Objective   Objective     ROS    Review of Systems   Constitutional: Positive for fatigue. Negative for fever.   HENT: Negative for dental problem, ear pain and trouble swallowing.    Eyes: Negative for pain and visual disturbance.   Respiratory: Positive for cough and shortness of breath.    Cardiovascular: Positive for palpitations and leg swelling. Negative for chest pain.   Gastrointestinal: Negative for abdominal pain, diarrhea, nausea and vomiting.   Endocrine: Negative for cold intolerance and heat intolerance.   Genitourinary: Negative for dysuria, flank pain and hematuria.   Musculoskeletal: Positive for arthralgias. Negative for gait problem and neck pain.   Skin: Negative for rash and wound.   Allergic/Immunologic: Negative for immunocompromised state.   Neurological: Positive for weakness. Negative for dizziness, syncope and headaches.   Psychiatric/Behavioral: Negative for agitation, confusion,  self-injury and suicidal ideas.       Exam    Vitals Signs    Temp:  [98.2 °F (36.8 °C)-98.8 °F (37.1 °C)] 98.2 °F (36.8 °C)  Heart Rate:  [] 85  Resp:  [18-25] 20  BP: (107-158)/(51-97) 158/87  Flow (L/min):  [2-3] 3     Physical Exam  Constitutional:       General: She is not in acute distress.     Appearance: Normal appearance. She is not toxic-appearing.   HENT:      Head: Atraumatic.      Right Ear: External ear normal.      Left Ear: External ear normal.      Nose: Nose normal.      Mouth/Throat:      Mouth: Mucous membranes are moist.   Eyes:      General:         Right eye: No discharge.         Left eye: No discharge.      Extraocular Movements: Extraocular movements intact.      Pupils: Pupils are equal, round, and reactive to light.   Cardiovascular:      Rate and Rhythm: Normal rate. Rhythm irregular.      Pulses: Normal pulses.      Heart sounds: Murmur heard.   No gallop.    Pulmonary:      Effort: Pulmonary effort is normal. No respiratory distress.      Breath sounds: Rales present. No wheezing or rhonchi.   Abdominal:      General: There is no distension.      Palpations: Abdomen is soft. There is no mass.      Tenderness: There is no abdominal tenderness. There is no guarding.   Musculoskeletal:         General: No swelling or tenderness.      Cervical back: No tenderness.      Right lower leg: Edema present.      Left lower leg: Edema present.      Comments: Really just trace edema at this time, she has already started diuresing.   Skin:     General: Skin is warm and dry.      Findings: No rash.   Neurological:      General: No focal deficit present.      Mental Status: She is alert and oriented to person, place, and time. Mental status is at baseline.      Motor: Weakness present.      Gait: Gait normal.   Psychiatric:         Mood and Affect: Mood normal.         Thought Content: Thought content normal.          Labs:        Lab 12/17/21  1301   WBC 8.18   HEMOGLOBIN 12.6   HEMATOCRIT 37.1    PLATELETS 201           Lab 12/17/21  1301   SODIUM 140   POTASSIUM 4.4   CHLORIDE 103   CO2 23.8   BUN 13       Images:    XR Chest 1 View    Result Date: 12/17/2021  PROCEDURE: XR CHEST 1 VW  COMPARISON: [facility], CT, CT CHEST W CONTRAST DIAGNOSTIC, 12/10/2021, 13:29.  [facility], CR, CHEST PA/AP & LAT 2V, 12/30/2019, 10:54.  INDICATIONS: SOA Triage Protocol  FINDINGS:   The cardiac silhouette is enlarged.  Bilateral pleural effusions are present.  There is increasing airspace opacity throughout the lung fields.  No evidence of pneumothorax.  IMPRESSION: Findings suggest worsening congestive heart failure with bilateral pleural effusions.  Superimposed pneumonia is also possible.   TIFFANIE CLEARY MD       Electronically Signed and Approved By: TIFFANIE CLEARY MD on 12/17/2021 at 14:01             CT Chest Pulmonary Embolism    Result Date: 12/17/2021  PROCEDURE: CT CHEST PULMONARY EMBOLISM W CONTRAST  COMPARISON:  [facility], CT, CT CHEST W CONTRAST DIAGNOSTIC, 12/10/2021, 13:29. INDICATIONS: PE suspected, low/intermediate prob, positive D-dimer  PROTOCOL:   Pulmonary embolism imaging protocol performed    RADIATION:   DLP: 507.2 mGy*cm   Automated exposure control was utilized to minimize radiation dose. CONTRAST: 75 cc Isovue 370 I.V. LABS:   eGFR: <60 ml/min/1.73m2  TECHNIQUE: Axial images of the chest with intravenous contrast.  FINDINGS: No pulmonary emboli are identified.  Cardiomegaly is present.  There are moderate pleural effusions.  There is diffuse ground-glass opacity in the aerated lungs consistent with pulmonary edema.  There is dependent airspace consolidation likely atelectasis.  Coronary artery and aortic valvular calcification are present.  The thoracic aorta has a normal caliber.  Images of the visualized upper abdomen are unremarkable.  Degenerative changes are present in the thoracic spine.  IMPRESSION:  Stable exam.   Findings consistent with congestive heart failure with moderate bilateral pleural effusions.  Dependent airspace consolidation is likely atelectasis.  Pneumonia is also possible.  TIFFANIE CLEARY MD       Electronically Signed and Approved By: TIFFANIE CLEARY MD on 12/17/2021 at 14:17               Assessment / Plan     Impression    1.  New onset acute CHF.  Patient had echo on 2/5/2021 which revealed EF 55 to 60%.  Apparently just with mild MR as well, although it was mentioned that the study was technically difficult.  There was not any significant LVH noted on that echo either.  So it is likely this CHF is just related to her new onset A. fib, as there is no report of any specific chest pain, and initial troponin is negative.  Repeat echo has been requested, patient is being started on ACEI, Lasix, and Aldactone, she was already on Lanoxin.  If her rate tolerates this, her beta-blockers is being switched to Coreg.  Additionally, Dr. Thomas has been consulted on behalf of Dr. Lopez.    2.  New onset atrial fibrillation.  Patient not with optimal rate control, but certainly not uncontrolled.  We will see if she tolerates the switch to carvedilol, and also continue with daily digoxin.  She will be on telemetry, and will continue her Eliquis for anticoagulation.    3.  Longstanding hypertension.  This certainly appears adequately controlled and should only improve with changes being made above in regards to her CHF and A. Fib.  Of note, although not included on her home med list, does look like perhaps she has been on losartan/HCT as an outpatient, I will try to confirm this when I see her here shortly.    4.  Hypoxia.  This obviously related to the CHF, will use low flow oxygen per nasal cannula for now, and hopefully wean her off prior to discharge.    5.  DVT prophylaxis.  Via Eliquis.    6.  PUD prophylaxis.  Via chronic PPI.        Problem List    Active Problems:    Acute exacerbation of CHF (congestive  heart failure) (HCC)      Plan:  as above    DVT prophylaxis: Via Eliquis    CODE STATUS:    Code Status and Medical Interventions:   Ordered at: 12/17/21 2561     Level Of Support Discussed With:    Patient     Code Status (Patient has no pulse and is not breathing):    CPR (Attempt to Resuscitate)     Medical Interventions (Patient has pulse or is breathing):    Full Support       Electronically signed by Lino Arreola MD, 12/17/2021, 22:51 EST.

## 2021-12-18 LAB
ANION GAP SERPL CALCULATED.3IONS-SCNC: 13.6 MMOL/L (ref 5–15)
AORTIC DIMENSIONLESS INDEX: 0.5 (DI)
ASCENDING AORTA: 3.2 CM
BASOPHILS # BLD AUTO: 0.04 10*3/MM3 (ref 0–0.2)
BASOPHILS NFR BLD AUTO: 0.6 % (ref 0–1.5)
BH CV ECHO MEAS - AO MAX PG: 7 MMHG
BH CV ECHO MEAS - AO MEAN PG: 4 MMHG
BH CV ECHO MEAS - AO ROOT DIAM: 3 CM
BH CV ECHO MEAS - AO V2 MAX: 131 CM/SEC
BH CV ECHO MEAS - AO V2 VTI: 25.8 CM
BH CV ECHO MEAS - AVA PLANIMETRY TRACED: 1.6 CM2
BH CV ECHO MEAS - EDV(MOD-SP2): 113 ML
BH CV ECHO MEAS - EDV(MOD-SP4): 115 ML
BH CV ECHO MEAS - EF(MOD-BP): 39 %
BH CV ECHO MEAS - ESV(MOD-SP2): 64.1 ML
BH CV ECHO MEAS - ESV(MOD-SP4): 68.9 ML
BH CV ECHO MEAS - IVSD: 1 CM
BH CV ECHO MEAS - LA A2CS (ATRIAL LENGTH): 6.3 CM
BH CV ECHO MEAS - LA DIMENSION(2D): 4.3 CM
BH CV ECHO MEAS - LAT PEAK E' VEL: 6.6 CM/SEC
BH CV ECHO MEAS - LV MAX PG: 2 MMHG
BH CV ECHO MEAS - LV MEAN PG: 1 MMHG
BH CV ECHO MEAS - LV V1 MAX: 76.9 CM/SEC
BH CV ECHO MEAS - LV V1 VTI: 15.1 CM
BH CV ECHO MEAS - LVIDD: 5 CM
BH CV ECHO MEAS - LVIDS: 4.1 CM
BH CV ECHO MEAS - LVOT DIAM: 2 CM
BH CV ECHO MEAS - LVPWD: 1 CM
BH CV ECHO MEAS - MED PEAK E' VEL: 5.51 CM/SEC
BH CV ECHO MEAS - MR MAX PG: 90 MMHG
BH CV ECHO MEAS - MR MAX VEL: 473 CM/SEC
BH CV ECHO MEAS - MR MEAN PG: 68 MMHG
BH CV ECHO MEAS - MR MEAN VEL: 401 CM/SEC
BH CV ECHO MEAS - MR VTI: 142 CM
BH CV ECHO MEAS - MV DEC TIME: 197 MSEC
BH CV ECHO MEAS - MV E MAX VEL: 136 CM/SEC
BH CV ECHO MEAS - MV MAX PG: 13 MMHG
BH CV ECHO MEAS - MV MEAN PG: 7 MMHG
BH CV ECHO MEAS - MV P1/2T: 69 MSEC
BH CV ECHO MEAS - MV V2 VTI: 21 CM
BH CV ECHO MEAS - PA V2 MAX: 67 CM/SEC
BH CV ECHO MEAS - PAPD(PI EDV): 15 MMHG
BH CV ECHO MEAS - PI END-D VEL: 194 CM/SEC
BH CV ECHO MEAS - RVDD: 2.5 CM
BH CV ECHO MEAS - RVSP: 44 MMHG
BH CV ECHO MEAS - TR MAX PG: 29 MMHG
BH CV ECHO MEAS - TR MAX VEL: 267 CM/SEC
BH CV ECHO MEASUREMENTS AVERAGE E/E' RATIO: 22.46
BUN SERPL-MCNC: 12 MG/DL (ref 8–23)
BUN/CREAT SERPL: 11.8 (ref 7–25)
CALCIUM SPEC-SCNC: 9.6 MG/DL (ref 8.6–10.5)
CHLORIDE SERPL-SCNC: 103 MMOL/L (ref 98–107)
CO2 SERPL-SCNC: 26.4 MMOL/L (ref 22–29)
CREAT SERPL-MCNC: 1.02 MG/DL (ref 0.57–1)
DEPRECATED RDW RBC AUTO: 55.9 FL (ref 37–54)
DIGOXIN SERPL-MCNC: 0.53 NG/ML (ref 0.6–1.2)
EOSINOPHIL # BLD AUTO: 0.12 10*3/MM3 (ref 0–0.4)
EOSINOPHIL NFR BLD AUTO: 1.8 % (ref 0.3–6.2)
ERYTHROCYTE [DISTWIDTH] IN BLOOD BY AUTOMATED COUNT: 15 % (ref 12.3–15.4)
GFR SERPL CREATININE-BSD FRML MDRD: 52 ML/MIN/1.73
GLUCOSE SERPL-MCNC: 96 MG/DL (ref 65–99)
HCT VFR BLD AUTO: 37.5 % (ref 34–46.6)
HGB BLD-MCNC: 12.2 G/DL (ref 12–15.9)
IMM GRANULOCYTES # BLD AUTO: 0.02 10*3/MM3 (ref 0–0.05)
IMM GRANULOCYTES NFR BLD AUTO: 0.3 % (ref 0–0.5)
LEFT ATRIUM VOLUME INDEX: 43 ML/M2
LEFT ATRIUM VOLUME: 74.8 CM3
LYMPHOCYTES # BLD AUTO: 1.33 10*3/MM3 (ref 0.7–3.1)
LYMPHOCYTES NFR BLD AUTO: 19.8 % (ref 19.6–45.3)
MAXIMAL PREDICTED HEART RATE: 142 BPM
MCH RBC QN AUTO: 32.8 PG (ref 26.6–33)
MCHC RBC AUTO-ENTMCNC: 32.5 G/DL (ref 31.5–35.7)
MCV RBC AUTO: 100.8 FL (ref 79–97)
MONOCYTES # BLD AUTO: 0.56 10*3/MM3 (ref 0.1–0.9)
MONOCYTES NFR BLD AUTO: 8.3 % (ref 5–12)
NEUTROPHILS NFR BLD AUTO: 4.66 10*3/MM3 (ref 1.7–7)
NEUTROPHILS NFR BLD AUTO: 69.2 % (ref 42.7–76)
NRBC BLD AUTO-RTO: 0 /100 WBC (ref 0–0.2)
PLATELET # BLD AUTO: 190 10*3/MM3 (ref 140–450)
PMV BLD AUTO: 10.5 FL (ref 6–12)
POTASSIUM SERPL-SCNC: 4 MMOL/L (ref 3.5–5.2)
RBC # BLD AUTO: 3.72 10*6/MM3 (ref 3.77–5.28)
SODIUM SERPL-SCNC: 143 MMOL/L (ref 136–145)
STRESS TARGET HR: 121 BPM
TROPONIN T SERPL-MCNC: <0.01 NG/ML (ref 0–0.03)
WBC NRBC COR # BLD: 6.73 10*3/MM3 (ref 3.4–10.8)

## 2021-12-18 PROCEDURE — 85025 COMPLETE CBC W/AUTO DIFF WBC: CPT | Performed by: INTERNAL MEDICINE

## 2021-12-18 PROCEDURE — 36415 COLL VENOUS BLD VENIPUNCTURE: CPT | Performed by: INTERNAL MEDICINE

## 2021-12-18 PROCEDURE — 25010000002 FUROSEMIDE PER 20 MG: Performed by: INTERNAL MEDICINE

## 2021-12-18 PROCEDURE — 84484 ASSAY OF TROPONIN QUANT: CPT | Performed by: INTERNAL MEDICINE

## 2021-12-18 PROCEDURE — 80162 ASSAY OF DIGOXIN TOTAL: CPT | Performed by: INTERNAL MEDICINE

## 2021-12-18 PROCEDURE — 80048 BASIC METABOLIC PNL TOTAL CA: CPT | Performed by: INTERNAL MEDICINE

## 2021-12-18 PROCEDURE — 99222 1ST HOSP IP/OBS MODERATE 55: CPT | Performed by: INTERNAL MEDICINE

## 2021-12-18 PROCEDURE — 99233 SBSQ HOSP IP/OBS HIGH 50: CPT | Performed by: INTERNAL MEDICINE

## 2021-12-18 RX ADMIN — PANTOPRAZOLE SODIUM 40 MG: 40 TABLET, DELAYED RELEASE ORAL at 09:11

## 2021-12-18 RX ADMIN — SODIUM CHLORIDE, PRESERVATIVE FREE 10 ML: 5 INJECTION INTRAVENOUS at 09:24

## 2021-12-18 RX ADMIN — DIGOXIN 125 MCG: 125 TABLET ORAL at 12:36

## 2021-12-18 RX ADMIN — FUROSEMIDE 40 MG: 10 INJECTION, SOLUTION INTRAMUSCULAR; INTRAVENOUS at 17:48

## 2021-12-18 RX ADMIN — APIXABAN 5 MG: 5 TABLET, FILM COATED ORAL at 09:23

## 2021-12-18 RX ADMIN — APIXABAN 5 MG: 5 TABLET, FILM COATED ORAL at 20:22

## 2021-12-18 RX ADMIN — SODIUM CHLORIDE, PRESERVATIVE FREE 10 ML: 5 INJECTION INTRAVENOUS at 20:22

## 2021-12-18 RX ADMIN — FUROSEMIDE 40 MG: 10 INJECTION, SOLUTION INTRAMUSCULAR; INTRAVENOUS at 09:11

## 2021-12-18 RX ADMIN — CARVEDILOL 6.25 MG: 6.25 TABLET, FILM COATED ORAL at 17:48

## 2021-12-18 NOTE — PLAN OF CARE
Goal Outcome Evaluation:  Plan of Care Reviewed With: patient        Progress: no change  Outcome Summary: pt admitted to the floor tonight, pt stated soa is better now that she is on o2, pt up to bsc a lot through the night due to lasix, pt denied any further needs and will continue to monitor.

## 2021-12-18 NOTE — CASE MANAGEMENT/SOCIAL WORK
Discharge Planning Assessment   [place]     Patient Name: Bella Brandt  MRN: [id number]  Today's Date: 12/18/2021    Admit Date: 12/17/2021     Discharge Needs Assessment     Row Name 12/18/21 1051       Living Environment    Lives With child(russel), adult    Current Living Arrangements home/apartment/condo    Primary Care Provided by self; child(russel)    Family Caregiver if Needed child(russel), adult    Quality of Family Relationships helpful; involved; supportive    Able to Return to Prior Arrangements yes       Resource/Environmental Concerns    Resource/Environmental Concerns none       Transition Planning    Patient/Family Anticipates Transition to home with family    Transportation Anticipated family or friend will provide       Discharge Needs Assessment    Readmission Within the Last 30 Days no previous admission in last 30 days    Equipment Currently Used at Home cane, straight; walker, standard    Concerns to be Addressed no discharge needs identified    Anticipated Changes Related to Illness none    Equipment Needed After Discharge commode; oxygen  Pt. stated that she would speak with son, might like to have a BSC upon d/c. No preference in DME agency. Will follow up.               Discharge Plan     Row Name 12/18/21 4516       Plan    Plan Pt. is faily independent at home. Lives with son who cooks, cleans and provides transport for patient. Pt. would like to return home with son. States he will come and pick her up upon d/c. Denies any needs for HH/rehab at this time. Pt. says from living to bathroom it is a further walk and might want a BSC for living room but would like to discuss with her son first. Also currently on 3 L NC, no home O2. No preference in agency. Will need to follow up with patient and son.              Continued Care and Services - Admitted Since 12/17/2021    Coordination has not been started for this encounter.     Selected Continued Care - Episodes Includes selections from active  Coordinated Care Management episodes    High Risk Care Management Episode start date: 12/13/2021   There are no active outsourced providers for this episode.                  Demographic Summary     Row Name 12/18/21 1054       General Information    Admission Type inpatient    Arrived From emergency department    Reason for Consult discharge planning    Preferred Language English     Used During This Interaction no       Contact Information    Permission Granted to Share Info With family/designee               Functional Status     Row Name 12/18/21 1055       Functional Status    Usual Activity Tolerance good    Current Activity Tolerance moderate       Functional Status, IADL    Medications independent    Meal Preparation assistive person    Housekeeping assistive person    Laundry assistive person    Shopping assistive person       Mental Status    General Appearance WDL WDL       Mental Status Summary    Recent Changes in Mental Status/Cognitive Functioning no changes               Psychosocial    No documentation.                Abuse/Neglect    No documentation.                Legal    No documentation.                Substance Abuse    No documentation.                Patient Forms    No documentation.                   Joanie Treadwell RN

## 2021-12-18 NOTE — PLAN OF CARE
Goal Outcome Evaluation:  B/P low this am 103/57. She had blood pressure medications and diuretics ordered. MD notified. He said to hold all but IV lasix. She has no c/o of pain. Up in chair for most of shift.

## 2021-12-18 NOTE — CONSULTS
[facility]    CARDIOLOGY CONSULT NOTE    Patient Name: Bella Brandt  : 1945  MRN: [id number]  Primary Care Physician:  Taiwo Nazario MD  Date of admission: 2021    Subjective   Subjective     Chief Complaint: shortness of breath    HPI:  Bella Brandt is a 76 y.o. female with persistent atrial fibrillation (on chronic anticoagulation with Eliquis), hypertension, and type II diabetes mellitus.  She is followed in clinic by Dr. Lopez.  Ms. Brandt presented to the ED yesterday with worsening shortness of breath over the past two weeks.  She states her dyspnea had slowly progressed to where she was having limiting dyspnea with even walking across a room.  She required 3 L of supplemental O2 upon arrival.  Her admission ECG showed atrial fibrillation with a heart rate in the 90s, as well as poor R wave progression and nonspecific T wave abnormalities.  Her CXR demonstrated pulmonary edema with bilateral pleural effusions, and her proBNP level was elevated at 3214.  An echo obtained shortly after admission showed moderately reduced left ventricular systolic function with an estimated ejection fraction of 35-40% and moderate global hypokinesis (her EF was previously normal per a previous echo in 2021).  She was also observed to have diastolic dysfunction and mild-moderate mitral stenosis with mild mitral regurgitation on that study.  Ms. Brandt states her breathing is improving after receiving several doses of IV diuretic therapy.  She does not report any chest pain/pressure, palpitations, or syncope.  Her telemetry monitor shows rate controlled atrial fibrillation.    Review of Systems   Constitutional: Positive for fatigue. Negative for chills and fever.   HENT: Negative for hearing loss, sore throat and trouble swallowing.    Eyes: Negative for pain and visual disturbance.   Respiratory: Positive for cough and shortness of breath. Negative for chest tightness and wheezing.     Cardiovascular: Positive for leg swelling. Negative for chest pain.   Gastrointestinal: Negative for abdominal distention, abdominal pain, blood in stool and vomiting.   Endocrine: Negative for cold intolerance and heat intolerance.   Genitourinary: Negative for dysuria and hematuria.   Musculoskeletal: Positive for arthralgias. Negative for joint swelling.   Skin: Negative for color change, pallor and rash.   Neurological: Negative for syncope, speech difficulty, light-headedness and headaches.   Hematological: Negative for adenopathy. Bruises/bleeds easily.        Personal History     Past Medical History:   Diagnosis Date   • Allergies    • Atrial fibrillation (HCC)    • Back pain    • Carotid bruit    • Chronic fatigue, unspecified    • Depression    • Depression    • Essential (primary) hypertension .   • GERD without esophagitis    • Heart failure, unspecified (HCC)    • Hypercholesterolemia with LDL greater than 190 mg/dL    • Hypothyroidism    • Syncope and collapse    • Type 2 diabetes mellitus without complication (HCC)    • Vitamin D deficiency, unspecified        Past Surgical History:   Procedure Laterality Date   • BREAST BIOPSY     • CATARACT EXTRACTION     •  SECTION     • COLONOSCOPY      C-Scope 203 and 208   • HYSTERECTOMY      partial    • MASTECTOMY     • REPLACEMENT TOTAL KNEE Right 2013   • TONSILLECTOMY     • UPPER GASTROINTESTINAL ENDOSCOPY         Family History: family history includes Colon cancer in her brother and sister; Heart attack in her sister. Otherwise pertinent FHx was reviewed and not pertinent to current issue.    Social History:  reports that she quit smoking about 55 years ago. Her smoking use included cigarettes. She has never used smokeless tobacco. She reports previous alcohol use. She reports that she does not use drugs.    Home Medications:  Magnesium, apixaban, digoxin, metoprolol tartrate, and pantoprazole    Allergies:  No Known  Allergies    Objective    Objective     Vitals:   Temp:  [96.2 °F (35.7 °C)-98.8 °F (37.1 °C)] 97.9 °F (36.6 °C)  Heart Rate:  [] 97  Resp:  [18-25] 20  BP: ()/(51-97) 103/57  Flow (L/min):  [2-3] 3    Physical Exam  Constitutional:       General: She is not in acute distress.     Appearance: Normal appearance.   HENT:      Head: Atraumatic.      Mouth/Throat:      Mouth: Mucous membranes are moist.      Pharynx: Oropharynx is clear. No oropharyngeal exudate.   Eyes:      General: No scleral icterus.     Conjunctiva/sclera: Conjunctivae normal.   Neck:      Vascular: No carotid bruit or JVD.   Cardiovascular:      Rate and Rhythm: Normal rate. Rhythm irregularly irregular. Occasional extrasystoles are present.     Chest Wall: PMI is not displaced.      Pulses:           Radial pulses are 2+ on the right side and 2+ on the left side.      Heart sounds: S1 normal and S2 normal. Murmur heard.    No systolic murmur is present.   Diastolic murmur is present with a grade of 2/4.  No friction rub. No gallop. No S3 sounds.    Pulmonary:      Effort: Pulmonary effort is normal. No tachypnea, accessory muscle usage or respiratory distress.      Breath sounds: Examination of the right-lower field reveals decreased breath sounds. Examination of the left-lower field reveals decreased breath sounds. Decreased breath sounds and rales present. No wheezing or rhonchi.   Chest:      Chest wall: No tenderness.   Abdominal:      General: Bowel sounds are normal. There is no distension.      Palpations: Abdomen is soft. There is no mass.      Tenderness: There is no abdominal tenderness.      Comments: Morbidly obese.   Musculoskeletal:         General: No swelling, tenderness or deformity.      Cervical back: Neck supple. No tenderness.      Right lower le+ Edema present.      Left lower le+ Edema present.   Skin:     General: Skin is warm and dry.      Coloration: Skin is not jaundiced.      Findings: No erythema  or rash.      Nails: There is no clubbing.   Neurological:      General: No focal deficit present.      Mental Status: She is alert and oriented to person, place, and time.      Motor: No weakness.   Psychiatric:         Mood and Affect: Mood normal.         Behavior: Behavior normal.       Result Review    Result Review:  I have personally reviewed the results from the time of this admission to 12/18/2021 09:46 EST and agree with these findings:  [x]  Laboratory  []  Microbiology  [x]  Radiology  [x]  EKG/Telemetry   []  Cardiology/Vascular   []  Pathology  [x]  Old records  []  Other:  Most notable findings include: proBNP = 3214, Troponin < 0.010 x 2 sets, Cr = 1.02, K = 4.0, Digoxin level = 0.53, Hgb = 12.6   -CTA chest showed moderate bilateral pleural effusions and pulmonary edema/vascular congestion; no evidence of PE was observed     Assessment/Plan   Assessment / Plan     Brief Patient Summary:  Bella Brandt is a 76 y.o. female with:  -Acute combined systolic and diastolic CHF, NYHA class III-IV, improving  -Cardiomyopathy, EF = 35-40% with moderate global hypokinesis; ? tachycardia-induced CM from suboptimally controlled atrial fibrillation (LVEF was previously normal per echo in February 2021)  -Persistent atrial fibrillation, on chronic anticoagulation with Eliquis  -Mild-moderate mitral stenosis with dense mitral annular calcification and mitral leaflet thickening  -Moderate bilateral pleural effusions  -Type II diabetes mellitus  -Essential hypertension    Active Hospital Problems:  Active Hospital Problems    Diagnosis    • Acute exacerbation of CHF (congestive heart failure) (HCC)        Plan:   -Consider Pulmonary evaluation for possible thoracentesis due to her moderate bilateral pleural effusions  -In view of significant new onset LV dysfunction noted on echo this admission, would recommend definitive evaluation with [facility] (to rule out an ischemic etiology) once her acute CHF is improved.    -Her  Afib rate appears adequately controlled for now.  Continue Coreg and Digoxin at current doses.  Monitor renal function closely. She is on chronic anticoagulation with Eliquis for stroke prophylaxis.  -Continue diuresis with IV Lasix at 40 mg BID for now.  It does not appear that strict I/Os are accurately documented, but she is clinically improving and appears to be diuresing adequately.  Monitor lytes closely and replete as needed.  -Continue lisinopril and Aldactone at current doses      DVT prophylaxis:  Medical and mechanical DVT prophylaxis orders are present.    CODE STATUS:    Level Of Support Discussed With: Patient  Code Status (Patient has no pulse and is not breathing): CPR (Attempt to Resuscitate)  Medical Interventions (Patient has pulse or is breathing): Full Support      Electronically signed by Lino Thomas MD, 12/18/21, 9:46 AM EST.

## 2021-12-18 NOTE — PROGRESS NOTES
Patient Name: Bella Brandt Date of Admission: 2021   : 1945 MRN: [id number]   Location: [address] CSN: [id number]       [facility]   Follow Up  DATE: 2021  Primary Care Provider  Taiwo Nazario MD    Subjective   Subjective     Subjective  Patient seen on rounds this afternoon, she was up in the chair, remains very anxious, but did report that her breathing seems somewhat improved.  She is able to get herself back and forth to the bathroom, which is a good thing because she is diuresing fairly well.  She discussed with me that Dr. Thomas looked in on her earlier today, and it sounds like he is recommending pulmonary evaluate her for thoracentesis.  Patient had no ischemic type chest pain, but does report some pleuritic posterior pain on the right.    Objective   Objective     ROS    Review of Systems   Constitutional: Positive for fatigue. Negative for fever.   HENT: Negative for dental problem, ear pain and trouble swallowing.    Eyes: Negative for pain and visual disturbance.   Respiratory: Positive for cough and shortness of breath.    Cardiovascular: Positive for chest pain and palpitations. Negative for leg swelling.        Right posterior pleuritic chest pain   Gastrointestinal: Negative for abdominal pain, diarrhea, nausea and vomiting.   Endocrine: Negative for cold intolerance and heat intolerance.   Genitourinary: Negative for dysuria, flank pain and hematuria.   Musculoskeletal: Positive for arthralgias. Negative for gait problem and neck pain.   Skin: Negative for rash and wound.   Allergic/Immunologic: Negative for immunocompromised state.   Neurological: Positive for weakness. Negative for dizziness, syncope and headaches.   Psychiatric/Behavioral: Negative for agitation, confusion, self-injury and suicidal ideas. The patient is nervous/anxious.        Exam    Vitals Signs    Temp:  [96.2 °F (35.7 °C)-98.2 °F (36.8 °C)] 97.7 °F (36.5 °C)  Heart Rate:  []  84  Resp:  [20] 20  BP: ()/(57-92) 106/61  Flow (L/min):  [2-3] 2    Intake & Output (last 3 days)       12/15 0701  12/16 0700 12/16 0701  12/17 0700 12/17 0701  12/18 0700 12/18 0701  12/19 0700    P.O.    480    Total Intake(mL/kg)    480 (4.2)    Net    +480            Urine Unmeasured Occurrence   5 x 5 x    Stool Unmeasured Occurrence    1 x           Physical Exam  Constitutional:       General: She is not in acute distress.     Appearance: Normal appearance. She is not toxic-appearing.   HENT:      Head: Atraumatic.      Right Ear: External ear normal.      Left Ear: External ear normal.      Nose: Nose normal.      Mouth/Throat:      Mouth: Mucous membranes are moist.   Eyes:      General:         Right eye: No discharge.         Left eye: No discharge.      Extraocular Movements: Extraocular movements intact.      Pupils: Pupils are equal, round, and reactive to light.   Cardiovascular:      Rate and Rhythm: Normal rate. Rhythm irregular.      Pulses: Normal pulses.      Heart sounds: Normal heart sounds. No murmur heard.  No gallop.       Comments: Irregular irregular.  Pulmonary:      Effort: Pulmonary effort is normal. No respiratory distress.      Breath sounds: Rales present. No wheezing or rhonchi.      Comments: Bibasilar rales persist, decreased breath sounds in the bases as well.  Abdominal:      General: There is no distension.      Palpations: Abdomen is soft. There is no mass.      Tenderness: There is no abdominal tenderness. There is no guarding.   Musculoskeletal:         General: No swelling or tenderness.      Cervical back: No tenderness.      Right lower leg: No edema.      Left lower leg: No edema.      Comments: Edema has resolved, she did not have much to begin with though actually.   Skin:     General: Skin is warm and dry.      Findings: No rash.   Neurological:      General: No focal deficit present.      Mental Status: She is alert and oriented to person, place, and time.  Mental status is at baseline.      Motor: Weakness present.      Gait: Gait normal.      Comments: Ambulating in room independently.   Psychiatric:         Mood and Affect: Mood normal.         Thought Content: Thought content normal.      Comments: Pleasant but anxious.         Labs:        Lab 12/18/21  0424 12/17/21  1301   WBC 6.73 8.18   HEMOGLOBIN 12.2 12.6   HEMATOCRIT 37.5 37.1   PLATELETS 190 201           Lab 12/18/21  0424 12/17/21  1301   SODIUM 143 140   POTASSIUM 4.0 4.4   CHLORIDE 103 103   CO2 26.4 23.8   BUN 12 13       Assessment / Plan     Current Medications    Scheduled Meds:!Patient Home Medications Stored in Pharmacy, , Does not apply, BID  apixaban, 5 mg, Oral, Q12H  carvedilol, 6.25 mg, Oral, BID With Meals  digoxin, 125 mcg, Oral, Daily  furosemide, 40 mg, Intravenous, BID  lisinopril, 5 mg, Oral, Q24H  pantoprazole, 40 mg, Oral, Daily  sodium chloride, 10 mL, Intravenous, Q12H  spironolactone, 25 mg, Oral, Daily      Continuous Infusions:   PRN Meds:.acetaminophen **OR** acetaminophen **OR** acetaminophen  •  aluminum-magnesium hydroxide-simethicone  •  senna-docusate sodium **AND** polyethylene glycol **AND** bisacodyl **AND** bisacodyl  •  calcium carbonate  •  melatonin  •  nitroglycerin  •  ondansetron  •  sodium chloride     Impression:    1.  New onset acute CHF.  Patient had echo on 2/5/2021 which revealed EF 55 to 60%.  Apparently just with mild MR as well, although it was mentioned that the study was technically difficult.  There was not any significant LVH noted on that echo either.  So it is likely this CHF is just related to her new onset A. fib, as there is no report of any specific chest pain, and initial troponin is negative.  Repeat echo has been requested, patient is being started on ACEI, Lasix, and Aldactone, she was already on Lanoxin.  If her rate tolerates this, her beta-blockers is being switched to Coreg.  Additionally, Dr. Thomas has been consulted on behalf of   John.---> Echo came back with EF 35 to 40% with moderate global hypokinesis.  As noted this is new in just the past 10 months, patient will need either stress imaging or catheterization.  Obviously defer to cardiology on this.  She is diuresing, so we will continue Lasix, the other medications are being limited at this time by her relative hypotension.  Follow-up labs daily.     2.  New onset atrial fibrillation.  Patient not with optimal rate control, but certainly not uncontrolled.  We will see if she tolerates the switch to carvedilol, and also continue with daily digoxin.  She will be on telemetry, and will continue her Eliquis for anticoagulation.---> Rate is controlled in the 80s so far, she is getting the dig, but I believe carvedilol was held due to hypotension this morning.     3.  Longstanding hypertension.  This certainly appears adequately controlled and should only improve with changes being made above in regards to her CHF and A. Fib.  Of note, although not included on her home med list, does look like perhaps she has been on losartan/HCT as an outpatient, I will try to confirm this when I see her here shortly---> blood pressure obviously not an issue as noted above.     4.  Hypoxia.  This obviously related to the CHF, will use low flow oxygen per nasal cannula for now, and hopefully wean her off prior to discharge.    5.  Bilateral pleural effusions.  Is reported to be moderate, but agree that it is reasonable to have pulmonology look at her for possible thoracentesis to help with her oxygenation.  Eliquis is on board, so would need a day or 2 to washout.     5.  DVT prophylaxis.  Via Eliquis.     6.  PUD prophylaxis.  Via chronic PPI.    Problem List    Active Problems:    Acute exacerbation of CHF (congestive heart failure) ([facility])      Plan:  as above      Electronically signed by Lino Arreola MD, 12/18/2021, 17:17 EST.

## 2021-12-18 NOTE — ED NOTES
"STAFF CALLED  ON BEHALF OF  TO INFORM HIM THAT  WAS ADDED AS A SPECIALITY CONSULTATION FOR  PATIENT CARE. PATIENT IS CURRENTLY HOLDING IN THE ED.   IS AWARE AND STATED \"OK\".     Eliana Morales  12/17/21 1913    "

## 2021-12-19 LAB
ANION GAP SERPL CALCULATED.3IONS-SCNC: 15 MMOL/L (ref 5–15)
BUN SERPL-MCNC: 14 MG/DL (ref 8–23)
BUN/CREAT SERPL: 14 (ref 7–25)
CALCIUM SPEC-SCNC: 9.4 MG/DL (ref 8.6–10.5)
CHLORIDE SERPL-SCNC: 95 MMOL/L (ref 98–107)
CO2 SERPL-SCNC: 31 MMOL/L (ref 22–29)
CREAT SERPL-MCNC: 1 MG/DL (ref 0.57–1)
GFR SERPL CREATININE-BSD FRML MDRD: 54 ML/MIN/1.73
GLUCOSE SERPL-MCNC: 99 MG/DL (ref 65–99)
POTASSIUM SERPL-SCNC: 3.7 MMOL/L (ref 3.5–5.2)
SODIUM SERPL-SCNC: 141 MMOL/L (ref 136–145)

## 2021-12-19 PROCEDURE — 99233 SBSQ HOSP IP/OBS HIGH 50: CPT | Performed by: INTERNAL MEDICINE

## 2021-12-19 PROCEDURE — 80048 BASIC METABOLIC PNL TOTAL CA: CPT | Performed by: INTERNAL MEDICINE

## 2021-12-19 PROCEDURE — 99232 SBSQ HOSP IP/OBS MODERATE 35: CPT | Performed by: INTERNAL MEDICINE

## 2021-12-19 PROCEDURE — 25010000002 FUROSEMIDE PER 20 MG: Performed by: INTERNAL MEDICINE

## 2021-12-19 RX ORDER — POTASSIUM CHLORIDE 750 MG/1
10 CAPSULE, EXTENDED RELEASE ORAL 2 TIMES DAILY WITH MEALS
Status: DISCONTINUED | OUTPATIENT
Start: 2021-12-19 | End: 2021-12-20

## 2021-12-19 RX ADMIN — POTASSIUM CHLORIDE 10 MEQ: 750 CAPSULE, EXTENDED RELEASE ORAL at 18:19

## 2021-12-19 RX ADMIN — CARVEDILOL 6.25 MG: 6.25 TABLET, FILM COATED ORAL at 18:19

## 2021-12-19 RX ADMIN — SODIUM CHLORIDE, PRESERVATIVE FREE 10 ML: 5 INJECTION INTRAVENOUS at 19:56

## 2021-12-19 RX ADMIN — CARVEDILOL 6.25 MG: 6.25 TABLET, FILM COATED ORAL at 08:13

## 2021-12-19 RX ADMIN — PANTOPRAZOLE SODIUM 40 MG: 40 TABLET, DELAYED RELEASE ORAL at 08:11

## 2021-12-19 RX ADMIN — POTASSIUM CHLORIDE 10 MEQ: 750 CAPSULE, EXTENDED RELEASE ORAL at 12:20

## 2021-12-19 RX ADMIN — APIXABAN 5 MG: 5 TABLET, FILM COATED ORAL at 08:13

## 2021-12-19 RX ADMIN — LISINOPRIL 5 MG: 5 TABLET ORAL at 08:13

## 2021-12-19 RX ADMIN — FUROSEMIDE 40 MG: 10 INJECTION, SOLUTION INTRAMUSCULAR; INTRAVENOUS at 18:18

## 2021-12-19 RX ADMIN — SODIUM CHLORIDE, PRESERVATIVE FREE 10 ML: 5 INJECTION INTRAVENOUS at 08:14

## 2021-12-19 RX ADMIN — DIGOXIN 125 MCG: 125 TABLET ORAL at 12:20

## 2021-12-19 RX ADMIN — FUROSEMIDE 40 MG: 10 INJECTION, SOLUTION INTRAMUSCULAR; INTRAVENOUS at 08:10

## 2021-12-19 RX ADMIN — SPIRONOLACTONE 25 MG: 25 TABLET ORAL at 08:13

## 2021-12-19 NOTE — PROGRESS NOTES
[facility]     Progress Note    Patient Name: Bella Brandt  : 1945  MRN: [id number]  Primary Care Physician:  Taiwo Nazario MD  Date of admission: 2021    Subjective   Subjective     HPI:  Ms. Brandt states she continues to feel better and her breathing is improving.  No chest pain/pressure, nausea, lightheadedness, or palpitations reported.  Her telemetry monitor shows rate controlled chronic atrial fibrillation with a heart rate in the 70s-80s today.      Review of Systems  Negative except as per HPI    Objective   Objective     Vitals:   Temp:  [97.3 °F (36.3 °C)-98.1 °F (36.7 °C)] 98.1 °F (36.7 °C)  Heart Rate:  [81-89] 81  Resp:  [20] 20  BP: (102-126)/(54-88) 102/64  Flow (L/min):  [2] 2    Physical Exam  General: alert and oriented, no distress   Neck: supple, trace JVD, no bruit  Chest: decreased air movement in bases with faint crackles, no tachypnea, no wheezing  CV: irregularly irregular rhythm, normal rate, S1 and S2 present, +diastolic murmur at apex, no rub  Abdomen: soft, obese, non-distended, non-tender, + bowel sounds  Extremities: no cyanosis, trace bilateral lower extremity edema  Neuro: normal speech, no focal deficits    Current Medications:   •  !Patient Home Medications Stored in Pharmacy  •  acetaminophen **OR** acetaminophen **OR** acetaminophen  •  aluminum-magnesium hydroxide-simethicone  •  apixaban  •  senna-docusate sodium **AND** polyethylene glycol **AND** bisacodyl **AND** bisacodyl  •  calcium carbonate  •  carvedilol  •  digoxin  •  furosemide  •  lisinopril  •  melatonin  •  nitroglycerin  •  ondansetron  •  pantoprazole  •  potassium chloride  •  sodium chloride  •  sodium chloride  •  spironolactone     Result Review    Result Review:  I have personally reviewed the results from the time of this admission to 2021 13:48 EST and agree with these findings:  [x]  Laboratory  []  Microbiology  []  Radiology  [x]  EKG/Telemetry   [x]   Cardiology/Vascular   []  Pathology  []  Old records  []  Other:  Most notable findings include: Cr = 1.0, K = 3.7    Assessment/Plan   Assessment / Plan     Brief Patient Summary:  Bella Brandt is a 76 y.o. female with:  -Acute combined systolic and diastolic CHF, NYHA class III-IV, improving  -Cardiomyopathy, EF = 35-40% with moderate global hypokinesis; ? tachycardia-induced CM from suboptimally controlled atrial fibrillation (LVEF was previously normal per echo in February 2021)  -Persistent atrial fibrillation, on chronic anticoagulation with Eliquis, achieving adequate rate control  -Mild-moderate mitral stenosis with dense mitral annular calcification and mitral leaflet thickening  -Moderate bilateral pleural effusions  -Type II diabetes mellitus  -Essential hypertension, BP remains well controlled     Active Hospital Problems:  Active Hospital Problems    Diagnosis    • Acute exacerbation of CHF (congestive heart failure) (HCC)        Plan:   -Will repeat a CXR in the morning to reassess her pleural effusions.  She may benefit from Pulmonology evaluation to assess for potential thoracentesis tomorrow.  Hold Eliquis for now in case of possible thoracentesis.    -Continue diuresis with IV Lasix at current dosing.  Accurate I/Os unfortunately are not documented, but she appears clinically improved.    -She is achieving excellent rate control of her chronic Afib on current doses of Coreg and Digoxin; continue same  -She needs a LHC at some point to rule out an ischemic etiology of her her newly diagnosed moderate LV dysfunction (EF = 35-40% per echo this admission and EF was previously normal earlier this year).  Continue lisinopril, spironolactone, and carvedilol for cardiomyopathy.  We can consider the addition of Farxiga to her medication regimen for heart failure at some point in the near future.  -Dr. Lopez to assume her care in the morning       DVT prophylaxis:  Medical and mechanical DVT  prophylaxis orders are present.    CODE STATUS:    Level Of Support Discussed With: Patient  Code Status (Patient has no pulse and is not breathing): CPR (Attempt to Resuscitate)  Medical Interventions (Patient has pulse or is breathing): Full Support    Electronically signed by Lino Thomas MD, 12/19/21, 1:48 PM EST.

## 2021-12-19 NOTE — PROGRESS NOTES
Patient Name: Bella Brandt Date of Admission: 2021   : 1945 MRN: [id number]   Location: [address] [address] CSN: [id number]       [facility]   Follow Up  DATE: 2021  Primary Care Provider  Taiwo Nazario MD    Subjective   Subjective     Subjective  Patient seen on rounds this afternoon, she was up in the chair, remains very anxious, but did report that her breathing seems somewhat improved.  She is able to get herself back and forth to the bathroom, which is a good thing because she is diuresing fairly well.  She discussed with me that Dr. Thomas looked in on her earlier today, and it sounds like he is recommending pulmonary evaluate her for thoracentesis.  Patient had no ischemic type chest pain, but does report some pleuritic posterior pain on the right---> patient up in chair, resting comfortably earlier this afternoon .  Discussed with her plan of care in regards to possible thoracentesis tomorrow.  X-rays are ordered, and her Eliquis is on hold.  Will consult pulmonology tomorrow based on the findings.  I discussed with patient several times now was going on in regards to her new onset A. fib and resultant CHF.    Objective   Objective     ROS    Review of Systems   Constitutional: Positive for fatigue. Negative for fever.   HENT: Negative for dental problem, ear pain and trouble swallowing.    Eyes: Negative for pain and visual disturbance.   Respiratory: Positive for cough and shortness of breath.    Cardiovascular: Positive for chest pain and palpitations. Negative for leg swelling.        Right posterior pleuritic chest pain   Gastrointestinal: Negative for abdominal pain, diarrhea, nausea and vomiting.   Endocrine: Negative for cold intolerance and heat intolerance.   Genitourinary: Negative for dysuria, flank pain and hematuria.   Musculoskeletal: Positive for arthralgias. Negative for gait problem and neck pain.   Skin: Negative for rash and wound.    Allergic/Immunologic: Negative for immunocompromised state.   Neurological: Positive for weakness. Negative for dizziness, syncope and headaches.   Psychiatric/Behavioral: Negative for agitation, confusion, self-injury and suicidal ideas. The patient is nervous/anxious.         Remains quite anxious, but in no distress.  Query underlying dementia, I have explained to her the past 3 days what is going on in regards to her heart, and it seems like she is hearing for the first time each time.       Exam    Vitals Signs    Temp:  [97.3 °F (36.3 °C)-98.1 °F (36.7 °C)] 98 °F (36.7 °C)  Heart Rate:  [81-89] 81  Resp:  [20] 20  BP: (102-126)/(54-88) 108/64  Flow (L/min):  [2] 2    Intake & Output (last 3 days)       12/16 0701  12/17 0700 12/17 0701  12/18 0700 12/18 0701  12/19 0700 12/19 0701  12/20 0700    P.O.   720 240    Total Intake(mL/kg)   720 (6.4) 240 (2.1)    Urine (mL/kg/hr)   200 (0.1)     Total Output   200     Net   +520 +240            Urine Unmeasured Occurrence  5 x 8 x 2 x    Stool Unmeasured Occurrence   1 x            Physical Exam  Constitutional:       General: She is not in acute distress.     Appearance: Normal appearance. She is not toxic-appearing.   HENT:      Head: Atraumatic.      Right Ear: External ear normal.      Left Ear: External ear normal.      Nose: Nose normal.      Mouth/Throat:      Mouth: Mucous membranes are moist.   Eyes:      General:         Right eye: No discharge.         Left eye: No discharge.      Extraocular Movements: Extraocular movements intact.      Pupils: Pupils are equal, round, and reactive to light.   Cardiovascular:      Rate and Rhythm: Normal rate. Rhythm irregular.      Pulses: Normal pulses.      Heart sounds: Normal heart sounds. No murmur heard.  No gallop.       Comments: Irregular irregular.  Pulmonary:      Effort: Pulmonary effort is normal. No respiratory distress.      Breath sounds: Rales present. No wheezing or rhonchi.      Comments: Bibasilar  rales persist, decreased breath sounds in the bases as well.---> Improving breath sounds 12/19.  Abdominal:      General: There is no distension.      Palpations: Abdomen is soft. There is no mass.      Tenderness: There is no abdominal tenderness. There is no guarding.   Musculoskeletal:         General: No swelling or tenderness.      Cervical back: No tenderness.      Right lower leg: No edema.      Left lower leg: No edema.      Comments: Edema has resolved, she did not have much to begin with though actually.   Skin:     General: Skin is warm and dry.      Findings: No rash.   Neurological:      General: No focal deficit present.      Mental Status: She is alert and oriented to person, place, and time. Mental status is at baseline.      Motor: Weakness present.      Gait: Gait normal.      Comments: Ambulating in room independently.   Psychiatric:         Mood and Affect: Mood normal.         Thought Content: Thought content normal.      Comments: Pleasant but anxious.         Labs:        Lab 12/18/21  0424 12/17/21  1301   WBC 6.73 8.18   HEMOGLOBIN 12.2 12.6   HEMATOCRIT 37.5 37.1   PLATELETS 190 201           Lab 12/19/21  0407 12/18/21  0424   SODIUM 141 143   POTASSIUM 3.7 4.0   CHLORIDE 95* 103   CO2 31.0* 26.4   BUN 14 12       Assessment / Plan     Current Medications    Scheduled Meds:!Patient Home Medications Stored in Pharmacy, , Does not apply, BID  carvedilol, 6.25 mg, Oral, BID With Meals  digoxin, 125 mcg, Oral, Daily  furosemide, 40 mg, Intravenous, BID  lisinopril, 5 mg, Oral, Q24H  pantoprazole, 40 mg, Oral, Daily  potassium chloride, 10 mEq, Oral, BID With Meals  sodium chloride, 10 mL, Intravenous, Q12H  spironolactone, 25 mg, Oral, Daily      Continuous Infusions:   PRN Meds:.acetaminophen **OR** acetaminophen **OR** acetaminophen  •  aluminum-magnesium hydroxide-simethicone  •  senna-docusate sodium **AND** polyethylene glycol **AND** bisacodyl **AND** bisacodyl  •  calcium carbonate  •   melatonin  •  nitroglycerin  •  ondansetron  •  sodium chloride     Impression:    1.  New onset acute CHF.  Patient had echo on 2/5/2021 which revealed EF 55 to 60%.  Apparently just with mild MR as well, although it was mentioned that the study was technically difficult.  There was not any significant LVH noted on that echo either.  So it is likely this CHF is just related to her new onset A. Fib = tachycardia induced cardiomyopathy, as there is no report of any specific chest pain, and initial troponin is negative.  Repeat echo has been requested, patient is being started on ACEI, Lasix, and Aldactone, she was already on Lanoxin.  If her rate tolerates this, her beta-blockers is being switched to Coreg.  Additionally, Dr. Thomas has been consulted on behalf of Dr. Lopez.---> Echo came back with EF 35 to 40% with moderate global hypokinesis.  As noted this is new in just the past 10 months, patient will need either stress imaging or catheterization.  Obviously defer to cardiology on this.  She is diuresing, so we will continue Lasix, the other medications are being limited at this time by her relative hypotension.  Follow-up labs daily = continue same 12/19.     2.  New onset atrial fibrillation.  Patient not with optimal rate control, but certainly not uncontrolled.  We will see if she tolerates the switch to carvedilol, and also continue with daily digoxin.  She will be on telemetry, and will continue her Eliquis for anticoagulation...Rate is controlled in the 80s so far, she is getting the dig, but I believe carvedilol was held due to hypotension this morning---> rate remains controlled 12/19, Eliquis on hold for possible thoracentesis 12/20.    3. Longstanding hypertension.  This certainly appears adequately controlled and should only improve with changes being made above in regards to her CHF and A. Fib.  Of note, although not included on her home med list, does look like perhaps she has been on  losartan/HCT as an outpatient, I will try to confirm this when I see her here shortly---> blood pressure obviously not an issue as noted above.     4.  Hypoxia.  This obviously related to the CHF, will use low flow oxygen per nasal cannula for now, and hopefully wean her off prior to discharge---> stable on 1 to 2 L per nasal cannula 12/19.    5.  Bilateral pleural effusions.  Is reported to be moderate, but agree that it is reasonable to have pulmonology look at her for possible thoracentesis to help with her oxygenation.  Eliquis is on board, so would need a day or 2 to washout---> Eliquis on hold 12/19, PA and lateral chest x-ray with decubitus views is scheduled for 12/20.  Pulmonology is not aware as far as I know, consult them in the a.m. based on chest x-ray findings.     5.  DVT prophylaxis.  Via Eliquis.     6.  PUD prophylaxis.  Via chronic PPI.    Problem List    Active Problems:    Acute exacerbation of CHF (congestive heart failure) ([facility])      Plan:  as above      Electronically signed by Lino Arreola MD, 12/19/2021, 17:46 EST.

## 2021-12-19 NOTE — PLAN OF CARE
Goal Outcome Evaluation:  Plan of Care Reviewed With: patient        Progress: improving  Outcome Summary: pt said soa is getting better, pt o2 turned down to 2L NC and pt stats are 95% or higher, pt denied any further needs, vss, will cotinue to monitor.

## 2021-12-19 NOTE — PLAN OF CARE
Goal Outcome Evaluation:   Patient started on K+ this shift. She continues on IV lasix. She has been up in chair all shift. She ambulated to bathroom with stand by assist. Chest Xray ordered.

## 2021-12-20 ENCOUNTER — APPOINTMENT (OUTPATIENT)
Dept: GENERAL RADIOLOGY | Facility: HOSPITAL | Age: 76
End: 2021-12-20

## 2021-12-20 PROCEDURE — 25010000002 FUROSEMIDE PER 20 MG: Performed by: INTERNAL MEDICINE

## 2021-12-20 PROCEDURE — 99233 SBSQ HOSP IP/OBS HIGH 50: CPT | Performed by: INTERNAL MEDICINE

## 2021-12-20 PROCEDURE — 99232 SBSQ HOSP IP/OBS MODERATE 35: CPT | Performed by: SPECIALIST

## 2021-12-20 PROCEDURE — 71046 X-RAY EXAM CHEST 2 VIEWS: CPT

## 2021-12-20 RX ORDER — POTASSIUM CHLORIDE 750 MG/1
20 CAPSULE, EXTENDED RELEASE ORAL 2 TIMES DAILY WITH MEALS
Status: DISCONTINUED | OUTPATIENT
Start: 2021-12-20 | End: 2021-12-21 | Stop reason: HOSPADM

## 2021-12-20 RX ADMIN — PANTOPRAZOLE SODIUM 40 MG: 40 TABLET, DELAYED RELEASE ORAL at 08:07

## 2021-12-20 RX ADMIN — CARVEDILOL 6.25 MG: 6.25 TABLET, FILM COATED ORAL at 08:07

## 2021-12-20 RX ADMIN — FUROSEMIDE 40 MG: 10 INJECTION, SOLUTION INTRAMUSCULAR; INTRAVENOUS at 08:08

## 2021-12-20 RX ADMIN — SPIRONOLACTONE 25 MG: 25 TABLET ORAL at 08:07

## 2021-12-20 RX ADMIN — LISINOPRIL 5 MG: 5 TABLET ORAL at 08:07

## 2021-12-20 RX ADMIN — SODIUM CHLORIDE, PRESERVATIVE FREE 10 ML: 5 INJECTION INTRAVENOUS at 08:08

## 2021-12-20 RX ADMIN — POTASSIUM CHLORIDE 20 MEQ: 750 CAPSULE, EXTENDED RELEASE ORAL at 18:03

## 2021-12-20 RX ADMIN — POTASSIUM CHLORIDE 10 MEQ: 750 CAPSULE, EXTENDED RELEASE ORAL at 08:07

## 2021-12-20 RX ADMIN — CARVEDILOL 6.25 MG: 6.25 TABLET, FILM COATED ORAL at 18:03

## 2021-12-20 RX ADMIN — FUROSEMIDE 40 MG: 10 INJECTION, SOLUTION INTRAMUSCULAR; INTRAVENOUS at 18:03

## 2021-12-20 RX ADMIN — DIGOXIN 125 MCG: 125 TABLET ORAL at 11:49

## 2021-12-20 RX ADMIN — SODIUM CHLORIDE, PRESERVATIVE FREE 10 ML: 5 INJECTION INTRAVENOUS at 20:01

## 2021-12-20 NOTE — PROGRESS NOTES
[facility]   Progress Note    Patient Name: Bella Brandt  : 1945  MRN: [id number]  Primary Care Physician: Taiwo Nazario MD  Date of admission: 2021    Subjective   Subjective   HPI: Congestive heart failure exacerbation shortness of breath, says she is feeling better she sitting up in the chair she wants to know when she can go home she has she never knew she had atrial fibrillation before, she denies chest pain currently she still little short of breath no nausea vomiting or diarrhea        Review of Systems   All systems were reviewed and negative except for: As above      Objective   Objective     Vitals:  Patient Vitals for the past 24 hrs:   BP Temp Temp src Pulse Resp SpO2 Weight   21 0658 132/74 98.1 °F (36.7 °C) Oral 79 20 96 % --   21 0436 -- -- -- -- -- -- 110 kg (241 lb 6.5 oz)   21 0321 104/60 97.34 °F (36.3 °C) Oral 82 20 97 % --   21 2229 113/58 97.16 °F (36.2 °C) Oral 91 18 97 % --   21 1853 95/54 97.8 °F (36.6 °C) Oral 84 18 96 % --   21 1500 108/64 98 °F (36.7 °C) Oral 81 20 97 % --   21 1100 102/64 98.1 °F (36.7 °C) Oral 81 20 -- --      Physical Exam   Reveal diminished breath sounds a few crackles at bases cardiac exam reveals an irregular rhythm with a soft heart tones S3 and S4 gallop, abdomen is obese soft nontender lower extremities trace ankle to 1+ ankle edema bilaterally, no significant PreTAB edema she is alert sitting up in the chair talkative pleasant, mouth and skin are dry throughout, sclera nonicteric she moves all 4 extremities to command      Result Review    Result Review:  I have personally reviewed the results from the time of this admission to 21 8:46 AM EST and agree with these findings:  [x]  Laboratory  [x]  Microbiology  [x]  Radiology  [x]  EKG/Telemetry   [x]  Cardiology/Vascular   []  Pathology  [x]  Old records  []  Other:      Active Hospital Meds:  Scheduled Meds:!Patient Home  Medications Stored in Pharmacy, , Does not apply, BID  carvedilol, 6.25 mg, Oral, BID With Meals  digoxin, 125 mcg, Oral, Daily  furosemide, 40 mg, Intravenous, BID  lisinopril, 5 mg, Oral, Q24H  pantoprazole, 40 mg, Oral, Daily  potassium chloride, 10 mEq, Oral, BID With Meals  sodium chloride, 10 mL, Intravenous, Q12H  spironolactone, 25 mg, Oral, Daily      Continuous Infusions:   PRN Meds:.acetaminophen **OR** acetaminophen **OR** acetaminophen  •  aluminum-magnesium hydroxide-simethicone  •  senna-docusate sodium **AND** polyethylene glycol **AND** bisacodyl **AND** bisacodyl  •  calcium carbonate  •  melatonin  •  nitroglycerin  •  ondansetron  •  sodium chloride    Assessment/Plan   Assessment / Plan     Active Hospital Problems:  Active Hospital Problems    Diagnosis    • Acute exacerbation of CHF (congestive heart failure) ([facility])        Assessment/Plan:   1.  New onset acute CHF.  Patient had echo on 2/5/2021 which revealed EF 55 to 60%.  with mild MR as well, study was technically difficult. At that time there was not any significant LVH noted on that echo either. Most likely related to new onset A. Fib = tachycardia induced cardiomyopathy,   --> Echo came back with EF 35 to 40% with moderate global hypokinesis.  As noted this is new in just the past 10 months, patient will need either stress imaging or catheterization.  defer to cardiology on this.  She is diuresing, so we will continue Lasix,, Aldactone--, replace potassium, BR NP level at 3200 on admission, will trend,       2.  New onset atrial fibrillation. Rate controlled on monitor, currently on carvedilol, digoxin, will continue     3. Longstanding hypertension. Good control currently December 20, 2021, with lisinopril, Aldactone, carvedilol, diuretics,     4.  Hypoxia.  --stable on 1 to 2 L per nasal cannula 12/19.     5.  Bilateral pleural effusions. CT chest December 10 shows bilateral pleural effusions, compressive atelectasis and pulmonary  edema----, repeat CT scan December 17 shows no change moderate pulmonary edema bilateral pleural effusions of moderate degree most likely cardiac in nature -------- ? Possible thoracentesis to help with her oxygenation. -> Eliquis on hold 12/19, PA and lateral chest x-ray with decubitus views is scheduled for 12/20.  Pulmonology is not aware as far as I know, consult them in the a.m. based on chest x-ray findings.     5.  DVT prophylaxis.  Via Eliquis.     6.  PUD prophylaxis.  Via chronic PPI.        CODE STATUS:    Code Status and Medical Interventions:   Ordered at: 12/17/21 6994     Level Of Support Discussed With:    Patient     Code Status (Patient has no pulse and is not breathing):    CPR (Attempt to Resuscitate)     Medical Interventions (Patient has pulse or is breathing):    Full Support       Electronically signed by Taiwo Nazario MD, 12/20/21, 8:46 AM EST.

## 2021-12-20 NOTE — PLAN OF CARE
Problem: Adult Inpatient Plan of Care  Goal: Plan of Care Review  Outcome: Ongoing, Progressing  Flowsheets (Taken 12/20/2021 1510)  Progress: improving  Plan of Care Reviewed With: patient  Outcome Summary: patient on 2 liters nc prn. no other significant changes will continue to monitor patient.  Goal: Patient-Specific Goal (Individualized)  Outcome: Ongoing, Progressing  Goal: Absence of Hospital-Acquired Illness or Injury  Outcome: Ongoing, Progressing  Intervention: Identify and Manage Fall Risk  Recent Flowsheet Documentation  Taken 12/20/2021 1450 by Cassandra Cooper RN  Safety Promotion/Fall Prevention:   assistive device/personal items within reach   clutter free environment maintained   safety round/check completed   room organization consistent  Taken 12/20/2021 1149 by Cassandra Cooper RN  Safety Promotion/Fall Prevention:   safety round/check completed   room organization consistent   assistive device/personal items within reach   clutter free environment maintained  Taken 12/20/2021 0750 by Cassandra Cooper RN  Safety Promotion/Fall Prevention:   assistive device/personal items within reach   clutter free environment maintained   safety round/check completed   room organization consistent  Intervention: Prevent Infection  Recent Flowsheet Documentation  Taken 12/20/2021 1450 by Cassandra Cooper RN  Infection Prevention:   single patient room provided   rest/sleep promoted   environmental surveillance performed   equipment surfaces disinfected  Taken 12/20/2021 1149 by Cassandra Cooper RN  Infection Prevention:   single patient room provided   rest/sleep promoted   environmental surveillance performed   equipment surfaces disinfected  Taken 12/20/2021 0750 by Cassnadra Cooper RN  Infection Prevention:   single patient room provided   rest/sleep promoted   environmental surveillance performed   equipment surfaces disinfected  Goal: Optimal Comfort and Wellbeing  Outcome: Ongoing, Progressing  Intervention: Provide  Person-Centered Care  Recent Flowsheet Documentation  Taken 12/20/2021 0750 by Cassandra Cooper RN  Trust Relationship/Rapport:   thoughts/feelings acknowledged   reassurance provided   questions encouraged   questions answered   empathic listening provided   emotional support provided   choices provided   care explained  Goal: Readiness for Transition of Care  Outcome: Ongoing, Progressing     Problem: Heart Failure Comorbidity  Goal: Maintenance of Heart Failure Symptom Control  Outcome: Ongoing, Progressing  Intervention: Maintain Heart Failure-Management Strategies  Recent Flowsheet Documentation  Taken 12/20/2021 1450 by Cassandra Cooper RN  Medication Review/Management: medications reviewed  Taken 12/20/2021 1149 by Cassandra Cooper RN  Medication Review/Management: medications reviewed  Taken 12/20/2021 0750 by Cassandra Cooper RN  Medication Review/Management: medications reviewed     Problem: Hypertension Comorbidity  Goal: Blood Pressure in Desired Range  Outcome: Ongoing, Progressing  Intervention: Maintain Hypertension-Management Strategies  Recent Flowsheet Documentation  Taken 12/20/2021 1450 by Cassandra Cooper RN  Medication Review/Management: medications reviewed  Taken 12/20/2021 1149 by Cassandra Cooper RN  Medication Review/Management: medications reviewed  Taken 12/20/2021 0750 by Cassandra Cooper RN  Medication Review/Management: medications reviewed     Problem: Pain Chronic (Persistent) (Comorbidity Management)  Goal: Acceptable Pain Control and Functional Ability  Outcome: Ongoing, Progressing  Intervention: Develop Pain Management Plan  Recent Flowsheet Documentation  Taken 12/20/2021 0750 by Cassandra Cooper RN  Pain Management Interventions:   quiet environment facilitated   relaxation techniques promoted  Intervention: Manage Persistent Pain  Recent Flowsheet Documentation  Taken 12/20/2021 1450 by Cassandra Cooper RN  Medication Review/Management: medications reviewed  Taken 12/20/2021 1149 by Cassandra Cooper RN  Medication  Review/Management: medications reviewed  Taken 12/20/2021 0750 by Cassandra Cooper RN  Medication Review/Management: medications reviewed  Intervention: Optimize Psychosocial Wellbeing  Recent Flowsheet Documentation  Taken 12/20/2021 0750 by Cassandra Cooper RN  Diversional Activities: television  Family/Support System Care:   support provided   self-care encouraged     Problem: Skin Injury Risk Increased  Goal: Skin Health and Integrity  Outcome: Ongoing, Progressing     Problem: Fall Injury Risk  Goal: Absence of Fall and Fall-Related Injury  Outcome: Ongoing, Progressing  Intervention: Identify and Manage Contributors to Fall Injury Risk  Recent Flowsheet Documentation  Taken 12/20/2021 1450 by Cassandra Cooper RN  Medication Review/Management: medications reviewed  Taken 12/20/2021 1149 by Cassandra Cooper RN  Medication Review/Management: medications reviewed  Taken 12/20/2021 0750 by Cassandra Cooper RN  Medication Review/Management: medications reviewed  Intervention: Promote Injury-Free Environment  Recent Flowsheet Documentation  Taken 12/20/2021 1450 by Cassandra Cooper RN  Safety Promotion/Fall Prevention:   assistive device/personal items within reach   clutter free environment maintained   safety round/check completed   room organization consistent  Taken 12/20/2021 1149 by Cassandra Cooper RN  Safety Promotion/Fall Prevention:   safety round/check completed   room organization consistent   assistive device/personal items within reach   clutter free environment maintained  Taken 12/20/2021 0750 by Cassandra Cooper RN  Safety Promotion/Fall Prevention:   assistive device/personal items within reach   clutter free environment maintained   safety round/check completed   room organization consistent   Goal Outcome Evaluation:  Plan of Care Reviewed With: patient        Progress: improving  Outcome Summary: patient on 2 liters nc prn. no other significant changes will continue to monitor patient.

## 2021-12-20 NOTE — PROGRESS NOTES
[facility]     Cardiology Progress Note    Patient Name: Bella Brandt  : 1945  MRN: [id number]  Primary Care Physician:  Taiwo Nazario MD  Date of admission: 2021    Subjective   Subjective   CC: Shortness of breath    HPI:    Bella Brandt is a 76 y.o. female with history of atrial fibrillation and CHF.  Admitted worsening shortness of breath.  Feels better today on chair no acute distress.    Objective     ROS:  Respiratory: No Cough, No Dyspnea  Cardiovascular: No CP Classic for Angina    Vitals:   Temp:  [97.16 °F (36.2 °C)-98.1 °F (36.7 °C)] 98.1 °F (36.7 °C)  Heart Rate:  [79-91] 79  Resp:  [18-20] 20  BP: ()/(54-74) 132/74  Flow (L/min):  [2] 2  Physical Exam    Constitutional: Awake, alert, No acute distress   Eyes: PERRLA, sclerae anicteric, no conjunctival injection   HENT: NCAT, mucous membranes moist   Neck: Supple, no thyromegaly, no lymphadenopathy, trachea midline   Respiratory: Decreased to auscultation bilaterally, nonlabored respirations    Cardiovascular: RRR, no murmurs, rubs, or gallops, palpable pedal pulses bilaterally   Musculoskeletal: No bilateral ankle edema, no clubbing or cyanosis to extremities   Neurologic: Oriented x 3, strength symmetric in all extremities, speech clear  Current medications:  !Patient Home Medications Stored in Pharmacy, , Does not apply, BID  carvedilol, 6.25 mg, Oral, BID With Meals  digoxin, 125 mcg, Oral, Daily  furosemide, 40 mg, Intravenous, BID  lisinopril, 5 mg, Oral, Q24H  pantoprazole, 40 mg, Oral, Daily  potassium chloride, 10 mEq, Oral, BID With Meals  sodium chloride, 10 mL, Intravenous, Q12H  spironolactone, 25 mg, Oral, Daily      Current IV drips:     Results for orders placed during the hospital encounter of 21    Adult Transthoracic Echo Complete w/ Color, Spectral and Contrast if necessary per protocol    Interpretation Summary  Technically difficult study with limited visualization in some views.   Lumason echocardiographic contrast was administered to improve endocardial definition.  Moderately reduced left ventricular systolic function with an estimated ejection fraction of 35-40% and moderate global hypokinesis.  Diastolic function could not be fully assessed secondary to atrial fibrillation, but the tissue Doppler findings were consistent with elevated left atrial pressure.  Moderately dilated left atrium.  Mild-moderately dilated right atrium.  Moderate mitral annular calcification with moderately thickened mitral leaflets.  Mild-moderate mitral stenosis with peak and mean gradients across the valve of 13 and 7 mmHg, respectively.  Minimal prolapse of the anterior mitral leaflet was observed, as was extensive subvalvular calcification.  Mild mitral regurgitation.  Trileaflet aortic valve with mild sclerosis.  No evidence of aortic stenosis.  Trivial aortic regurgitation.  Estimated right ventricular systolic pressure was mildly elevated at 44 mmHg.  Dilated IVC with blunted respirophasic changes, consistent with significantly elevated central venous pressures.  A trivial pericardial effusion was observed.  Moderate size left pleural effusion.     Result Review       Result Review:  I have personally reviewed the results from the time of this admission to 12/20/2021 08:44 EST and agree with these findings:  [x]  Laboratory  []  Microbiology  [x]  Radiology  [x]  EKG  [x]  Cardiology/Vascular   CBC    CBC 9/3/21 12/17/21 12/18/21   WBC 5.39 8.18 6.73   RBC 3.85 3.85 3.72 (A)   Hemoglobin 12.3 12.6 12.2   Hematocrit 37.0 37.1 37.5   MCV 96.1 96.4 100.8 (A)   MCH 31.9 32.7 32.8   MCHC 33.2 34.0 32.5   RDW 12.9 14.8 15.0   Platelets 197 201 190   (A) Abnormal value            CMP    CMP 12/17/21 12/18/21 12/19/21   Glucose 106 (A) 96 99   BUN 13 12 14   Creatinine 0.99 1.02 (A) 1.00   eGFR Non African Am 54 (A) 52 (A) 54 (A)   Sodium 140 143 141   Potassium 4.4 4.0 3.7   Chloride 103 103 95 (A)   Calcium 9.7  9.6 9.4   Albumin 4.00     Total Bilirubin 0.9     Alkaline Phosphatase 58     AST (SGOT) 20     ALT (SGPT) 23     (A) Abnormal value                       Telemetry reviewed  Cardiac Medications Reviewed.    Assessment/Plan   Assessment / Plan   1.  Persistent atrial fibrillation with a controlled heart rate: Restart Eliquis for stroke prevention after thoracocentesis.  Continue digoxin and carvedilol for rate control.  2.  Cardiomyopathy probably nonischemic: Probably secondary to atrial fibrillation.  Continue carvedilol 6.25 mg twice daily and lisinopril 5 mg once a day.  Lexiscan sestamibi stress test when better to evaluate for any significant ischemia.  Can be done as an outpatient.  Echocardiogram reviewed  3.  Acute systolic CHF secondary to above: Continue IV Lasix.  Continue Aldactone.  Monitor I's and O's.  Improved.  4.  Essential hypertension controlled: Continue carvedilol and lisinopril.  5.  Bilateral pleural effusion: Possible thoracocentesis.      Electronically signed by Byron Lopez MD, 12/20/21, 8:44 AM EST.

## 2021-12-21 ENCOUNTER — READMISSION MANAGEMENT (OUTPATIENT)
Dept: CALL CENTER | Facility: HOSPITAL | Age: 76
End: 2021-12-21

## 2021-12-21 VITALS
WEIGHT: 240.08 LBS | OXYGEN SATURATION: 95 % | TEMPERATURE: 97.7 F | HEART RATE: 88 BPM | DIASTOLIC BLOOD PRESSURE: 55 MMHG | BODY MASS INDEX: 42.54 KG/M2 | HEIGHT: 63 IN | SYSTOLIC BLOOD PRESSURE: 96 MMHG | RESPIRATION RATE: 18 BRPM

## 2021-12-21 PROBLEM — I50.21 ACUTE SYSTOLIC CONGESTIVE HEART FAILURE (HCC): Status: ACTIVE | Noted: 2021-12-21

## 2021-12-21 LAB
25(OH)D3 SERPL-MCNC: 40 NG/ML (ref 30–100)
ALBUMIN SERPL-MCNC: 3.5 G/DL (ref 3.5–5.2)
ALBUMIN/GLOB SERPL: 1.4 G/DL
ALP SERPL-CCNC: 49 U/L (ref 39–117)
ALT SERPL W P-5'-P-CCNC: 10 U/L (ref 1–33)
ANION GAP SERPL CALCULATED.3IONS-SCNC: 11.7 MMOL/L (ref 5–15)
ANISOCYTOSIS BLD QL: NORMAL
AST SERPL-CCNC: 15 U/L (ref 1–32)
BASOPHILS # BLD AUTO: 0.04 10*3/MM3 (ref 0–0.2)
BASOPHILS # BLD AUTO: 0.04 10*3/MM3 (ref 0–0.2)
BASOPHILS NFR BLD AUTO: 0.7 % (ref 0–1.5)
BASOPHILS NFR BLD AUTO: 0.7 % (ref 0–1.5)
BILIRUB SERPL-MCNC: 1.1 MG/DL (ref 0–1.2)
BUN SERPL-MCNC: 19 MG/DL (ref 8–23)
BUN/CREAT SERPL: 17.4 (ref 7–25)
CALCIUM SPEC-SCNC: 9.7 MG/DL (ref 8.6–10.5)
CHLORIDE SERPL-SCNC: 96 MMOL/L (ref 98–107)
CO2 SERPL-SCNC: 27.3 MMOL/L (ref 22–29)
CREAT SERPL-MCNC: 1.09 MG/DL (ref 0.57–1)
DEPRECATED RDW RBC AUTO: 46.8 FL (ref 37–54)
DEPRECATED RDW RBC AUTO: 56.2 FL (ref 37–54)
EOSINOPHIL # BLD AUTO: 0.16 10*3/MM3 (ref 0–0.4)
EOSINOPHIL # BLD AUTO: 0.19 10*3/MM3 (ref 0–0.4)
EOSINOPHIL NFR BLD AUTO: 2.9 % (ref 0.3–6.2)
EOSINOPHIL NFR BLD AUTO: 3.2 % (ref 0.3–6.2)
ERYTHROCYTE [DISTWIDTH] IN BLOOD BY AUTOMATED COUNT: 13.4 % (ref 12.3–15.4)
ERYTHROCYTE [DISTWIDTH] IN BLOOD BY AUTOMATED COUNT: 14.3 % (ref 12.3–15.4)
FOLATE SERPL-MCNC: >20 NG/ML (ref 4.78–24.2)
GFR SERPL CREATININE-BSD FRML MDRD: 49 ML/MIN/1.73
GLOBULIN UR ELPH-MCNC: 2.5 GM/DL
GLUCOSE SERPL-MCNC: 97 MG/DL (ref 65–99)
HBA1C MFR BLD: 5.2 % (ref 4.8–5.6)
HCT VFR BLD AUTO: 37.4 % (ref 34–46.6)
HCT VFR BLD AUTO: 40.7 % (ref 34–46.6)
HGB BLD-MCNC: 12.2 G/DL (ref 12–15.9)
HGB BLD-MCNC: 12.6 G/DL (ref 12–15.9)
IMM GRANULOCYTES # BLD AUTO: 0.01 10*3/MM3 (ref 0–0.05)
IMM GRANULOCYTES # BLD AUTO: 0.01 10*3/MM3 (ref 0–0.05)
IMM GRANULOCYTES NFR BLD AUTO: 0.2 % (ref 0–0.5)
IMM GRANULOCYTES NFR BLD AUTO: 0.2 % (ref 0–0.5)
LYMPHOCYTES # BLD AUTO: 1.36 10*3/MM3 (ref 0.7–3.1)
LYMPHOCYTES # BLD AUTO: 1.41 10*3/MM3 (ref 0.7–3.1)
LYMPHOCYTES NFR BLD AUTO: 23.7 % (ref 19.6–45.3)
LYMPHOCYTES NFR BLD AUTO: 24.4 % (ref 19.6–45.3)
MACROCYTES BLD QL SMEAR: NORMAL
MAGNESIUM SERPL-MCNC: 2 MG/DL (ref 1.6–2.4)
MCH RBC QN AUTO: 31.8 PG (ref 26.6–33)
MCH RBC QN AUTO: 33 PG (ref 26.6–33)
MCHC RBC AUTO-ENTMCNC: 31 G/DL (ref 31.5–35.7)
MCHC RBC AUTO-ENTMCNC: 32.6 G/DL (ref 31.5–35.7)
MCV RBC AUTO: 106.5 FL (ref 79–97)
MCV RBC AUTO: 97.4 FL (ref 79–97)
MONOCYTES # BLD AUTO: 0.71 10*3/MM3 (ref 0.1–0.9)
MONOCYTES # BLD AUTO: 0.71 10*3/MM3 (ref 0.1–0.9)
MONOCYTES NFR BLD AUTO: 11.9 % (ref 5–12)
MONOCYTES NFR BLD AUTO: 12.7 % (ref 5–12)
NEUTROPHILS NFR BLD AUTO: 3.29 10*3/MM3 (ref 1.7–7)
NEUTROPHILS NFR BLD AUTO: 3.6 10*3/MM3 (ref 1.7–7)
NEUTROPHILS NFR BLD AUTO: 59.1 % (ref 42.7–76)
NEUTROPHILS NFR BLD AUTO: 60.3 % (ref 42.7–76)
NRBC BLD AUTO-RTO: 0 /100 WBC (ref 0–0.2)
NRBC BLD AUTO-RTO: 0 /100 WBC (ref 0–0.2)
NT-PROBNP SERPL-MCNC: 1036 PG/ML (ref 0–1800)
PLAT MORPH BLD: NORMAL
PLATELET # BLD AUTO: 175 10*3/MM3 (ref 140–450)
PLATELET # BLD AUTO: 180 10*3/MM3 (ref 140–450)
PMV BLD AUTO: 10.1 FL (ref 6–12)
PMV BLD AUTO: 10.4 FL (ref 6–12)
POTASSIUM SERPL-SCNC: 4 MMOL/L (ref 3.5–5.2)
PROT SERPL-MCNC: 6 G/DL (ref 6–8.5)
RBC # BLD AUTO: 3.82 10*6/MM3 (ref 3.77–5.28)
RBC # BLD AUTO: 3.84 10*6/MM3 (ref 3.77–5.28)
SODIUM SERPL-SCNC: 135 MMOL/L (ref 136–145)
TOXIC GRANULATION: NORMAL
TSH SERPL DL<=0.05 MIU/L-ACNC: 3.94 UIU/ML (ref 0.27–4.2)
VIT B12 BLD-MCNC: 377 PG/ML (ref 211–946)
WBC NRBC COR # BLD: 5.57 10*3/MM3 (ref 3.4–10.8)
WBC NRBC COR # BLD: 5.96 10*3/MM3 (ref 3.4–10.8)

## 2021-12-21 PROCEDURE — 83735 ASSAY OF MAGNESIUM: CPT | Performed by: INTERNAL MEDICINE

## 2021-12-21 PROCEDURE — 83036 HEMOGLOBIN GLYCOSYLATED A1C: CPT | Performed by: INTERNAL MEDICINE

## 2021-12-21 PROCEDURE — 85007 BL SMEAR W/DIFF WBC COUNT: CPT | Performed by: INTERNAL MEDICINE

## 2021-12-21 PROCEDURE — 94618 PULMONARY STRESS TESTING: CPT

## 2021-12-21 PROCEDURE — 82746 ASSAY OF FOLIC ACID SERUM: CPT | Performed by: INTERNAL MEDICINE

## 2021-12-21 PROCEDURE — 82607 VITAMIN B-12: CPT | Performed by: INTERNAL MEDICINE

## 2021-12-21 PROCEDURE — 25010000002 FUROSEMIDE PER 20 MG: Performed by: INTERNAL MEDICINE

## 2021-12-21 PROCEDURE — 83880 ASSAY OF NATRIURETIC PEPTIDE: CPT | Performed by: INTERNAL MEDICINE

## 2021-12-21 PROCEDURE — 84443 ASSAY THYROID STIM HORMONE: CPT | Performed by: INTERNAL MEDICINE

## 2021-12-21 PROCEDURE — 85025 COMPLETE CBC W/AUTO DIFF WBC: CPT | Performed by: INTERNAL MEDICINE

## 2021-12-21 PROCEDURE — 82306 VITAMIN D 25 HYDROXY: CPT | Performed by: INTERNAL MEDICINE

## 2021-12-21 PROCEDURE — 80053 COMPREHEN METABOLIC PANEL: CPT | Performed by: INTERNAL MEDICINE

## 2021-12-21 PROCEDURE — 99239 HOSP IP/OBS DSCHRG MGMT >30: CPT | Performed by: INTERNAL MEDICINE

## 2021-12-21 PROCEDURE — 99232 SBSQ HOSP IP/OBS MODERATE 35: CPT | Performed by: SPECIALIST

## 2021-12-21 RX ORDER — SPIRONOLACTONE 25 MG/1
25 TABLET ORAL DAILY
Qty: 30 TABLET | Refills: 5 | Status: SHIPPED | OUTPATIENT
Start: 2021-12-21 | End: 2022-03-22

## 2021-12-21 RX ORDER — FUROSEMIDE 40 MG/1
40 TABLET ORAL 2 TIMES DAILY
Qty: 60 TABLET | Refills: 5 | Status: SHIPPED | OUTPATIENT
Start: 2021-12-21 | End: 2022-06-01

## 2021-12-21 RX ORDER — POLYETHYLENE GLYCOL 3350 17 G/17G
17 POWDER, FOR SOLUTION ORAL DAILY PRN
Qty: 30 PACKET | Refills: 5 | Status: SHIPPED | OUTPATIENT
Start: 2021-12-21

## 2021-12-21 RX ORDER — CARVEDILOL 6.25 MG/1
6.25 TABLET ORAL 2 TIMES DAILY WITH MEALS
Qty: 60 TABLET | Refills: 5 | Status: SHIPPED | OUTPATIENT
Start: 2021-12-21 | End: 2022-06-01

## 2021-12-21 RX ORDER — POTASSIUM CHLORIDE 750 MG/1
20 CAPSULE, EXTENDED RELEASE ORAL 2 TIMES DAILY WITH MEALS
Qty: 60 CAPSULE | Refills: 5 | Status: SHIPPED | OUTPATIENT
Start: 2021-12-21 | End: 2022-05-31

## 2021-12-21 RX ORDER — PANTOPRAZOLE SODIUM 40 MG/1
40 TABLET, DELAYED RELEASE ORAL DAILY
Qty: 30 TABLET | Refills: 5 | Status: SHIPPED | OUTPATIENT
Start: 2021-12-21 | End: 2022-06-02 | Stop reason: SDUPTHER

## 2021-12-21 RX ORDER — DIGOXIN 125 MCG
125 TABLET ORAL
Qty: 30 TABLET | Refills: 5 | Status: SHIPPED | OUTPATIENT
Start: 2021-12-21 | End: 2022-05-31

## 2021-12-21 RX ORDER — LISINOPRIL 5 MG/1
5 TABLET ORAL
Qty: 30 TABLET | Refills: 5 | Status: SHIPPED | OUTPATIENT
Start: 2021-12-21 | End: 2022-03-22

## 2021-12-21 RX ORDER — AMOXICILLIN 250 MG
1 CAPSULE ORAL 2 TIMES DAILY PRN
Qty: 30 TABLET | Refills: 2 | Status: SHIPPED | OUTPATIENT
Start: 2021-12-21 | End: 2022-03-21

## 2021-12-21 RX ADMIN — CARVEDILOL 6.25 MG: 6.25 TABLET, FILM COATED ORAL at 08:31

## 2021-12-21 RX ADMIN — DIGOXIN 125 MCG: 125 TABLET ORAL at 11:59

## 2021-12-21 RX ADMIN — SODIUM CHLORIDE, PRESERVATIVE FREE 10 ML: 5 INJECTION INTRAVENOUS at 09:28

## 2021-12-21 RX ADMIN — SPIRONOLACTONE 25 MG: 25 TABLET ORAL at 09:33

## 2021-12-21 RX ADMIN — FUROSEMIDE 40 MG: 10 INJECTION, SOLUTION INTRAMUSCULAR; INTRAVENOUS at 08:31

## 2021-12-21 RX ADMIN — PANTOPRAZOLE SODIUM 40 MG: 40 TABLET, DELAYED RELEASE ORAL at 08:31

## 2021-12-21 RX ADMIN — POTASSIUM CHLORIDE 20 MEQ: 750 CAPSULE, EXTENDED RELEASE ORAL at 08:31

## 2021-12-21 RX ADMIN — LISINOPRIL 5 MG: 5 TABLET ORAL at 08:31

## 2021-12-21 NOTE — SIGNIFICANT NOTE
12/21/21 0930   Plan   Final Discharge Disposition Code 01 - home or self-care   Final Note Pt discharging home today. MATTHEW followed up with pt at bedside to discuss DME needs. Pt requesting for a BSC. MATTHEW notified [employer] and BSC will be delivered to pts residence. MATTHEW provided pt with a 30 Day free coupon card for Eliquis. Pts son will be transporting pt home this afternoon.

## 2021-12-21 NOTE — PROGRESS NOTES
[facility]     Cardiology Progress Note    Patient Name: Bella Brandt  : 1945  MRN: [id number]  Primary Care Physician:  Taiwo Nazario MD  Date of admission: 2021    Subjective   Subjective   CC: Shortness of breath    HPI:    Bella Brandt is a 76 y.o. female with history of with history of atrial fibrillation admitted with shortness of breath and CHF.  Improved sitting on the chair no acute distress.    Objective     ROS:  Respiratory: No Cough, No Dyspnea  Cardiovascular: No CP Classic for Angina    Vitals:   Temp:  [97.3 °F (36.3 °C)-98.1 °F (36.7 °C)] 97.7 °F (36.5 °C)  Heart Rate:  [77-92] 92  Resp:  [18-20] 18  BP: ()/(45-77) 125/77  Physical Exam    Constitutional: Awake, alert, No acute distress   Eyes: PERRLA, sclerae anicteric, no conjunctival injection   HENT: NCAT, mucous membranes moist   Neck: Supple, no thyromegaly, no lymphadenopathy, trachea midline   Respiratory: Decreased to auscultation bilaterally, nonlabored respirations    Cardiovascular: RRR, no murmurs, rubs, or gallops, palpable pedal pulses bilaterally   Musculoskeletal: No bilateral ankle edema, no clubbing or cyanosis to extremities   Neurologic: Oriented x 3, strength symmetric in all extremities, speech clear  Current medications:  !Patient Home Medications Stored in Pharmacy, , Does not apply, BID  carvedilol, 6.25 mg, Oral, BID With Meals  digoxin, 125 mcg, Oral, Daily  furosemide, 40 mg, Intravenous, BID  lisinopril, 5 mg, Oral, Q24H  pantoprazole, 40 mg, Oral, Daily  potassium chloride, 20 mEq, Oral, BID With Meals  sodium chloride, 10 mL, Intravenous, Q12H  spironolactone, 25 mg, Oral, Daily      Current IV drips:     Results for orders placed during the hospital encounter of 21    Adult Transthoracic Echo Complete w/ Color, Spectral and Contrast if necessary per protocol    Interpretation Summary  Technically difficult study with limited visualization in some views.  Lumason  echocardiographic contrast was administered to improve endocardial definition.  Moderately reduced left ventricular systolic function with an estimated ejection fraction of 35-40% and moderate global hypokinesis.  Diastolic function could not be fully assessed secondary to atrial fibrillation, but the tissue Doppler findings were consistent with elevated left atrial pressure.  Moderately dilated left atrium.  Mild-moderately dilated right atrium.  Moderate mitral annular calcification with moderately thickened mitral leaflets.  Mild-moderate mitral stenosis with peak and mean gradients across the valve of 13 and 7 mmHg, respectively.  Minimal prolapse of the anterior mitral leaflet was observed, as was extensive subvalvular calcification.  Mild mitral regurgitation.  Trileaflet aortic valve with mild sclerosis.  No evidence of aortic stenosis.  Trivial aortic regurgitation.  Estimated right ventricular systolic pressure was mildly elevated at 44 mmHg.  Dilated IVC with blunted respirophasic changes, consistent with significantly elevated central venous pressures.  A trivial pericardial effusion was observed.  Moderate size left pleural effusion.     Result Review       Result Review:  I have personally reviewed the results from the time of this admission to 12/21/2021 08:28 EST and agree with these findings:  [x]  Laboratory  []  Microbiology  [x]  Radiology  [x]  EKG  [x]  Cardiology/Vascular   CBC    CBC 12/17/21 12/18/21 12/21/21   WBC 8.18 6.73 5.57   RBC 3.85 3.72 (A) 3.82   Hemoglobin 12.6 12.2 12.6   Hematocrit 37.1 37.5 40.7   MCV 96.4 100.8 (A) 106.5 (A)   MCH 32.7 32.8 33.0   MCHC 34.0 32.5 31.0 (A)   RDW 14.8 15.0 14.3   Platelets 201 190 175   (A) Abnormal value            CMP    CMP 12/18/21 12/19/21 12/21/21   Glucose 96 99 97   BUN 12 14 19   Creatinine 1.02 (A) 1.00 1.09 (A)   eGFR Non African Am 52 (A) 54 (A) 49 (A)   Sodium 143 141 135 (A)   Potassium 4.0 3.7 4.0   Chloride 103 95 (A) 96 (A)    Calcium 9.6 9.4 9.7   Albumin   3.50   Total Bilirubin   1.1   Alkaline Phosphatase   49   AST (SGOT)   15   ALT (SGPT)   10   (A) Abnormal value                       Telemetry reviewed  Cardiac Medications Reviewed.    Assessment/Plan   Assessment / Plan      1.  Persistent atrial fibrillation with a controlled heart rate: Restart Eliquis for stroke prevention if okay with PMD.  Continue digoxin and carvedilol for rate control.  2.  Cardiomyopathy probably nonischemic: Probably secondary to atrial fibrillation.  Continue carvedilol 6.25 mg twice daily and lisinopril 5 mg once a day.  Lexiscan sestamibi stress test when better to evaluate for any significant ischemia as an outpatient  3.  Acute systolic CHF secondary to above: Continue IV Lasix.  Continue Aldactone.  Monitor I's and O's.  Improved.  Change to p.o. Lasix on discharge.  4.  Essential hypertension controlled: Continue carvedilol and lisinopril.  5.  Bilateral pleural effusion: Stable   6.  Can discharge home.  Follow-up in office as an outpatient in 2 to 3 weeks.    Electronically signed by Byron Lopez MD, 12/21/21, 8:28 AM EST.

## 2021-12-21 NOTE — DISCHARGE SUMMARY
[facility]         DISCHARGE SUMMARY    Patient Name: Bella Brandt  : 1945  MRN: [id number]    Date of Admission: 2021  Date of Discharge:  2021  Primary Care Physician: Taiwo Nazario MD    Consults     Date and Time Order Name Status Description    2021  7:00 PM Inpatient Cardiology Consult Completed       Symmetry shows A. fib controlled rate, some artifact 2021    Presenting Problem:   Shortness of breath on exertion [R06.02]  Acute exacerbation of CHF (congestive heart failure) (HCC) [I50.9]  Acute systolic congestive heart failure (HCC) [I50.21]    Active and Resolved Hospital Problems:  Active Hospital Problems    Diagnosis POA   • Acute systolic congestive heart failure (HCC) [I50.21] Unknown   • Acute exacerbation of CHF (congestive heart failure) (HCC) [I50.9] Yes   • Shortness of breath on exertion [R06.02] Yes      Resolved Hospital Problems   No resolved problems to display.         Hospital Course     Hospital Course:  Bella Brandt is a 76 y.o. female     1.  New onset acute CHF.  Patient had echo on 2021 which revealed EF 55 to 60%.  with mild MR as well, study was technically difficult. At that time there was not any significant LVH noted on that echo either. Most likely related to new onset A. Fib = tachycardia induced cardiomyopathy,   --> current Echo came back with EF 35 to 40% with moderate global hypokinesis.,  Technically difficult study, moderately dilated left atrium and right atrium moderate mitral annular calcification with thickened leaflets and mild to moderate mitral stenosis minimal prolapse of the anterior mitral leaflet mild aortic sclerosis with trivial aortic regurgitation mild right ventricular pressure elevations at 44 mmHg dilated IVC consistent with elevated central venous pressures moderate size left pleural effusion trivial pericardial effusion------ as noted this is new in just the past 10 months,  patient will need either stress imaging or catheterization.  defer to cardiology on this.  She is diuresing, so we will continue Lasix,, Aldactone--, replace potassium, BR NP level at 3200 on admission, will trend, , down to 1000 on the day of discharge with a potassium of 4.0 BUN 19 creatinine of 1.0 patient is stable for discharge today with current medications and anticoagulation with Eliquis, will defer outpatient cardiac testing to cardiology      2.  New onset atrial fibrillation. Rate controlled on monitor, currently on carvedilol, digoxin, will continue, anticoagulation with Eliquis will be restarted since were not going to do a thoracentesis today     3. Longstanding hypertension. Good control currently December 20, 2021, with lisinopril, Aldactone, carvedilol, diuretics,     4.  Hypoxia.  --stable on 1 to 2 L per nasal cannula 12/19.  Have asked nursing staff to do a 6-minute walk test to see if she needs oxygen at home     5.  Bilateral pleural effusions. CT chest December 10 shows bilateral pleural effusions, compressive atelectasis and pulmonary edema----, x-ray December 20 showed small pleural effusion so we will not attempt thoracentesis at this time, patient can follow-up with repeat chest x-rays in several weeks after aggressive diuretic regimen-----------------, repeat CT scan December 17 shows no change moderate pulmonary edema bilateral pleural effusions of moderate degree most likely cardiac in nature -------- ? Possible thoracentesis to help with her oxygenation. -> Eliquis on hold 12/19, PA and lateral chest x-ray with decubitus views is scheduled for 12/20.       5.  DVT prophylaxis.  Via Eliquis.     6.  PUD prophylaxis.  Via chronic PPI.    Day of Discharge     Patient Vitals for the past 24 hrs:   BP Temp Temp src Pulse Resp SpO2 Weight   12/21/21 0730 125/77 97.7 °F (36.5 °C) Oral 92 18 99 % --   12/21/21 0320 121/54 97.3 °F (36.3 °C) Oral 85 20 96 % 109 kg (240 lb 1.3 oz)   12/20/21 2315  95/55 97.9 °F (36.6 °C) Oral 77 18 96 % --   12/20/21 1900 101/48 97.9 °F (36.6 °C) Oral 88 20 96 % --   12/20/21 1427 105/50 97.9 °F (36.6 °C) Oral 78 20 96 % --   12/20/21 1103 116/45 98.1 °F (36.7 °C) Oral 90 20 96 % --        Physical Exam:  Her lungs show diminished breath sounds at the bases no crackles no wheezes cardiac exam revealed an irregular rhythm her monitor was noted, abdomen was soft nontender obese lower extremities no edema she sitting up in the chair she is very talkative alert pleasant coherent, moving all 4 extremities to command      Pertinent  and/or Most Recent Results     LAB RESULTS:      Lab 12/21/21  0453 12/18/21  0424 12/17/21  1301   WBC 5.57 6.73 8.18   HEMOGLOBIN 12.6 12.2 12.6   HEMATOCRIT 40.7 37.5 37.1   PLATELETS 175 190 201   NEUTROS ABS 3.29 4.66 6.27   IMMATURE GRANS (ABS) 0.01 0.02 0.02   LYMPHS ABS 1.36 1.33 1.12   MONOS ABS 0.71 0.56 0.69   EOS ABS 0.16 0.12 0.05   .5* 100.8* 96.4         Lab 12/21/21  0453 12/19/21  0407 12/18/21  0424 12/17/21  1301   SODIUM 135* 141 143 140   POTASSIUM 4.0 3.7 4.0 4.4   CHLORIDE 96* 95* 103 103   CO2 27.3 31.0* 26.4 23.8   ANION GAP 11.7 15.0 13.6 13.2   BUN 19 14 12 13   CREATININE 1.09* 1.00 1.02* 0.99   GLUCOSE 97 99 96 106*   CALCIUM 9.7 9.4 9.6 9.7   MAGNESIUM 2.0  --   --   --    TSH 3.940  --   --   --          Lab 12/21/21  0453 12/17/21  1301   TOTAL PROTEIN 6.0 6.6   ALBUMIN 3.50 4.00   GLOBULIN 2.5 2.6   ALT (SGPT) 10 23   AST (SGOT) 15 20   BILIRUBIN 1.1 0.9   ALK PHOS 49 58         Lab 12/21/21  0453 12/18/21  0424 12/17/21  1301   PROBNP 1,036.0  --  3,214.0*   TROPONIN T  --  <0.010 <0.010                 Brief Urine Lab Results  (Last result in the past 365 days)      Color   Clarity   Blood   Leuk Est   Nitrite   Protein   CREAT   Urine HCG        07/13/21 1525 Yellow   Clear   Negative   Moderate (2+)   Negative   Negative               Microbiology Results (last 10 days)     Procedure Component Value -  Date/Time    COVID-19,CEPHEID/NATASHA,COR/ADDIS/PAD/[facility] IN-HOUSE(OR EMERGENT/ADD-ON),NP SWAB IN TRANSPORT MEDIA 3-4 HR TAT, RT-PCR - Swab, Nasopharynx [[id number]]  (Normal) Collected: 12/17/21 1425    Lab Status: Final result Specimen: Swab from Nasopharynx Updated: 12/17/21 1928     COVID19 Not Detected    Narrative:      Fact sheet for providers: https://www.fda.gov/media/260069/download     Fact sheet for patients: https://www.fda.gov/media/293893/download  Fact sheet for providers: https://www.fda.gov/media/679422/download     Fact sheet for patients: https://www.fda.gov/media/895653/download                       Results for orders placed during the hospital encounter of 12/17/21    Adult Transthoracic Echo Complete w/ Color, Spectral and Contrast if necessary per protocol    Interpretation Summary  Technically difficult study with limited visualization in some views.  Lumason echocardiographic contrast was administered to improve endocardial definition.  Moderately reduced left ventricular systolic function with an estimated ejection fraction of 35-40% and moderate global hypokinesis.  Diastolic function could not be fully assessed secondary to atrial fibrillation, but the tissue Doppler findings were consistent with elevated left atrial pressure.  Moderately dilated left atrium.  Mild-moderately dilated right atrium.  Moderate mitral annular calcification with moderately thickened mitral leaflets.  Mild-moderate mitral stenosis with peak and mean gradients across the valve of 13 and 7 mmHg, respectively.  Minimal prolapse of the anterior mitral leaflet was observed, as was extensive subvalvular calcification.  Mild mitral regurgitation.  Trileaflet aortic valve with mild sclerosis.  No evidence of aortic stenosis.  Trivial aortic regurgitation.  Estimated right ventricular systolic pressure was mildly elevated at 44 mmHg.  Dilated IVC with blunted respirophasic changes, consistent with significantly elevated  central venous pressures.  A trivial pericardial effusion was observed.  Moderate size left pleural effusion.      Labs Pending at Discharge:  Pending Labs     Order Current Status    Folate In process    Hemoglobin A1c In process    Vitamin B12 In process    Vitamin D 25 Hydroxy In process          Imaging Results (All)     Procedure Component Value Units Date/Time    XR Chest Bilateral Decubitus [[id number]] Collected: 12/20/21 1142     Updated: 12/20/21 1145    Narrative:      PROCEDURE: XR CHEST BILATERAL DECUBITUS     COMPARISON: [facility], CR, XR CHEST 2 VW, 12/20/2021, 10:30.     INDICATIONS: Evaluate  pleural effusions     FINDINGS:   There are small bilateral pleural effusions.     CONCLUSION: Small bilateral pleural effusions.                  KEN YIN MD         Electronically Signed and Approved By: KEN YIN MD on 12/20/2021 at 11:42                     XR Chest 2 View [[id number]] Collected: 12/20/21 1127     Updated: 12/20/21 1130    Narrative:      PROCEDURE: XR CHEST 2 VW     COMPARISON: [facility], CR, CHEST PA/AP & LAT 2V, 12/30/2019, 10:54.    [facility], CT, CT CHEST PULMONARY EMBOLISM, 12/17/2021, 14:02.     INDICATIONS: reassess pleural effusions     FINDINGS:   There is a mild pulmonary edema pattern.  There is a left basilar opacity with small left pleural   effusion.  The heart and mediastinal contours appear stable.  The osseous structures appear intact.     CONCLUSION:   1. Mild pulmonary edema pattern with small left pleural effusion.  2. Left basilar opacity, which could reflect atelectasis or pneumonia.            KEN YIN MD         Electronically Signed and Approved By: KEN YIN MD on 12/20/2021 at 11:27                     CT Chest Pulmonary Embolism [[id number]] Collected: 12/17/21 1417     Updated: 12/17/21 1420    Narrative:      PROCEDURE: CT CHEST PULMONARY EMBOLISM W CONTRAST     COMPARISON:   [facility], CT, CT CHEST W CONTRAST DIAGNOSTIC, 12/10/2021,   13:29.  INDICATIONS: PE suspected, low/intermediate prob, positive D-dimer     PROTOCOL:   Pulmonary embolism imaging protocol performed      RADIATION:   DLP: 507.2 mGy*cm    Automated exposure control was utilized to minimize radiation dose.   CONTRAST: 75 cc Isovue 370 I.V.  LABS:   eGFR: <60 ml/min/1.73m2     TECHNIQUE: Axial images of the chest with intravenous contrast.     FINDINGS:  No pulmonary emboli are identified.  Cardiomegaly is present.  There are moderate pleural   effusions.  There is diffuse ground-glass opacity in the aerated lungs consistent with pulmonary   edema.  There is dependent airspace consolidation likely atelectasis.  Coronary artery and aortic   valvular calcification are present.  The thoracic aorta has a normal caliber.  Images of the   visualized upper abdomen are unremarkable.  Degenerative changes are present in the thoracic spine.     IMPRESSION:  Stable exam.  Findings consistent with congestive heart failure with moderate   bilateral pleural effusions.  Dependent airspace consolidation is likely atelectasis.  Pneumonia is   also possible.     TIFFANIE CLEARY MD         Electronically Signed and Approved By: TIFFANIE CLEARY MD on 12/17/2021 at 14:17                     XR Chest 1 View [[id number]] Collected: 12/17/21 1401     Updated: 12/17/21 1404    Narrative:      PROCEDURE: XR CHEST 1 VW     COMPARISON: [facility], CT, CT CHEST W CONTRAST DIAGNOSTIC, 12/10/2021,   13:29.  [facility], CR, CHEST PA/AP & LAT 2V, 12/30/2019, 10:54.     INDICATIONS: SOA Triage Protocol     FINDINGS:      The cardiac silhouette is enlarged.  Bilateral pleural effusions are present.  There is increasing   airspace opacity throughout the lung fields.  No evidence of pneumothorax.     IMPRESSION:  Findings suggest worsening congestive heart failure with bilateral pleural  effusions.  Superimposed   pneumonia is also possible.       TIFFANIE CLEARY MD         Electronically Signed and Approved By: TIFFANIE CLEARY MD on 12/17/2021 at 14:01                          Discharge Details        Discharge Medications      New Medications      Instructions Start Date   carvedilol 6.25 MG tablet  Commonly known as: COREG   6.25 mg, Oral, 2 Times Daily With Meals      furosemide 40 MG tablet  Commonly known as: Lasix   40 mg, Oral, 2 Times Daily      lisinopril 5 MG tablet  Commonly known as: PRINIVIL,ZESTRIL   5 mg, Oral, Every 24 Hours Scheduled      polyethylene glycol 17 g packet  Commonly known as: MIRALAX   17 g, Oral, Daily PRN      potassium chloride 10 MEQ CR capsule  Commonly known as: MICRO-K   20 mEq, Oral, 2 Times Daily With Meals      sennosides-docusate 8.6-50 MG per tablet  Commonly known as: PERICOLACE   1 tablet, Oral, 2 Times Daily PRN      spironolactone 25 MG tablet  Commonly known as: ALDACTONE   25 mg, Oral, Daily         Changes to Medications      Instructions Start Date   apixaban 5 MG tablet tablet  Commonly known as: Eliquis  What changed: Another medication with the same name was added. Make sure you understand how and when to take each.   5 mg, Oral, Every 12 Hours Scheduled      apixaban 5 MG tablet tablet  Commonly known as: ELIQUIS  What changed: You were already taking a medication with the same name, and this prescription was added. Make sure you understand how and when to take each.   5 mg, Oral, 2 Times Daily      digoxin 125 MCG tablet  Commonly known as: Lanoxin  What changed: Another medication with the same name was added. Make sure you understand how and when to take each.   125 mcg, Oral, Daily Digoxin      digoxin 125 MCG tablet  Commonly known as: Lanoxin  What changed: You were already taking a medication with the same name, and this prescription was added. Make sure you understand how and when to take each.   125 mcg, Oral, Daily Digoxin       pantoprazole 40 MG EC tablet  Commonly known as: PROTONIX  What changed: Another medication with the same name was added. Make sure you understand how and when to take each.   40 mg, Oral, Daily      pantoprazole 40 MG EC tablet  Commonly known as: PROTONIX  What changed: You were already taking a medication with the same name, and this prescription was added. Make sure you understand how and when to take each.   40 mg, Oral, Daily         Continue These Medications      Instructions Start Date   Magnesium 500 MG capsule   500 mg, Oral, Every Other Day         Stop These Medications    metoprolol tartrate 25 MG tablet  Commonly known as: LOPRESSOR            No Known Allergies      Discharge Disposition:  Home or Self Care    Diet:  Diet Instructions     Diet: Cardiac      Discharge Diet: Cardiac            Discharge Activity:   Activity Instructions     Gradually Increase Activity Until at Pre-Hospitalization Level              CODE STATUS:  Code Status and Medical Interventions:   Ordered at: 12/17/21 4811     Level Of Support Discussed With:    Patient     Code Status (Patient has no pulse and is not breathing):    CPR (Attempt to Resuscitate)     Medical Interventions (Patient has pulse or is breathing):    Full Support         Future Appointments   Date Time Provider Department Center   12/30/2021  8:30 AM [facility] NM CARD ADMIN RM 1 [facility]   12/30/2021  9:15 AM [facility] NM CARD 2 SCAN ROOM [facility] NM [facility]   12/30/2021  9:45 AM [facility] NM STRESS TREADMILL 1 [facility]   12/30/2021 10:45 AM [facility] NM CARD 2 SCAN ROOM [facility] NM [facility]   1/5/2022 12:15 PM [facility] CCA [place] ECHO/VAS 1 [facility] CD [place] [facility]   3/14/2022 11:30 AM Taiwo Nazario MD [facility] PC KELSIE [facility]   3/22/2022 12:00 PM Byron Lopez MD [facility] CD [place] [facility]   5/25/2022  9:00 AM [facility] [place] SUSU 2 [facility] ETWMM [facility]       Additional Instructions for the Follow-ups that You Need to Schedule     Discharge Follow-up with PCP   As directed       Currently Documented PCP:     Taiwo Nazario MD    PCP Phone Number:    [phone]     Follow Up Details: followup in 7-10 days         Discharge Follow-up with PCP   As directed       Currently Documented PCP:    Taiwo Nazario MD    PCP Phone Number:    [phone]     Follow Up Details: followup in 7-10 days         CBC & Differential    Dec 28, 2021 (Approximate)      Manual Differential: No    Release to patient: Immediate         Comprehensive Metabolic Panel    Dec 28, 2021 (Approximate)      Release to patient: Immediate               Time spent on Discharge including face to face service:  41    minutes    Electronically signed by Taiwo Nazario MD, 12/21/21, 8:05 AM EST.

## 2021-12-21 NOTE — NURSING NOTE
Exercise Oximetry    Patient Name:Bella Brandt   MRN: [id number]   Date: 12/21/21             ROOM AIR BASELINE   SpO2%94   Heart Rate 93   Blood Pressure      EXERCISE ON ROOM AIR SpO2% EXERCISE ON O2 @ LPM SpO2%   1 MINUTE 94 1 MINUTE    2 MINUTES 94 2 MINUTES    3 MINUTES 88 3 MINUTES    4 MINUTES 92 4 MINUTES    5 MINUTES 92 5 MINUTES    6 MINUTES 91 6 MINUTES               Distance Walked  300 ft Distance Walked   Dyspnea (Pola Scale)   Dyspnea (Pola Scale)   Fatigue (Pola Scale)   Fatigue (Pola Scale)   SpO2% Post Exercise  91 SpO2% Post Exercise   HR Post Exercise  94 HR Post Exercise   Time to Recovery   Time to Recovery     Comments:

## 2021-12-21 NOTE — OUTREACH NOTE
Prep Survey      Responses   [facility] patient discharged from? [facility]   Is LACE score < 7 ? No   Emergency Room discharge w/ pulse ox? No   Eligibility [facility]   Date of Admission 12/17/21   Date of Discharge 12/21/21   Discharge Disposition Home or Self Care   Discharge diagnosis   New onset acute CHF,    New onset atrial fibrillation   Does the patient have one of the following disease processes/diagnoses(primary or secondary)? CHF   Does the patient have Home health ordered? No   Is there a DME ordered? Yes   What DME was ordered? [facility]   Prep survey completed? Yes          Lorraine Ruiz RN

## 2021-12-21 NOTE — PLAN OF CARE
Goal Outcome Evaluation:  Plan of Care Reviewed With: patient           Outcome Summary: Patient was sitting up in chair, dressed and eating breakfast this morning, Patient was doing well, Patient is being discharged home, No other changes to report .

## 2021-12-22 ENCOUNTER — TRANSITIONAL CARE MANAGEMENT TELEPHONE ENCOUNTER (OUTPATIENT)
Dept: CALL CENTER | Facility: HOSPITAL | Age: 76
End: 2021-12-22

## 2021-12-22 ENCOUNTER — TELEPHONE (OUTPATIENT)
Dept: CARDIOLOGY | Facility: CLINIC | Age: 76
End: 2021-12-22

## 2021-12-22 ENCOUNTER — PATIENT ROUNDING (BHMG ONLY) (OUTPATIENT)
Dept: CARDIOLOGY | Facility: CLINIC | Age: 76
End: 2021-12-22

## 2021-12-22 NOTE — TELEPHONE ENCOUNTER
Spk with the patient about her recent new patient appt on 12/14/2021. The patient had stated that our provider that saw her in the hospital told her that they would have her stress test cancelled and didn't. The patient stated that she is not able to have the stress test done at this time.  Adv the patient that I would call the hospital and cancel the appt for her. Patient is aware that she does have an echo on 01/05/2022.    Called the hospital and spoke with Lali. Lali cancelled the stress test for the patient.

## 2021-12-22 NOTE — PROGRESS NOTES
December 22, 2021    Hello, may I speak with Bella Brandt?    My name is Bella MCLAUGHLIN.      I am  with [facility] CARDIOLOGY  [address] [place] [id number] [phone].    Before we get started may I verify your date of birth? 1945    I am calling to officially welcome you to our practice and ask about your recent visit. Is this a good time to talk? yes    Tell me about your visit with us. What things went well?  The patient stated that she was very happy with her visit. The patient stated that she did have a problem. The patient stated that the our provider that saw her in the hospital advised her that they would have her stress test cancelled and it wasn't.      We're always looking for ways to make our patients' experiences even better. Do you have recommendations on ways we may improve?  no    Overall were you satisfied with your first visit to our practice? yes       I appreciate you taking the time to speak with me today. Is there anything else I can do for you? no      Thank you, and have a great day.

## 2021-12-22 NOTE — OUTREACH NOTE
"Call Center TCM Note      Responses   [facility] patient discharged from? [facility]   Does the patient have one of the following disease processes/diagnoses(primary or secondary)? CHF   TCM attempt successful? Yes   Call start time 1144   Call end time 1202   Discharge diagnosis   New onset acute CHF,    New onset atrial fibrillation   Medication alerts for this patient ELIQUIS---   Meds reviewed with patient/caregiver? Yes   Is the patient having any side effects they believe may be caused by any medication additions or changes? Yes   Side effects comments  Pt reports she has an occasional dry cough--recently started on Lisinopril, no fever, no SOA noted--will continue to monitor for worsening cough   Does the patient have all medications ordered at discharge? Yes   Is the patient taking all medications as directed (includes completed medication regime)? Yes   Medication comments Reviewed uses/indications of medications----patient has a pill planner ordered to have organized and has a nurse to help her will filling   Comments regarding appointments Echo 1/5/22 at Cardiology office   Does the patient have a primary care provider?  Yes   Does the patient have an appointment with their PCP within 7 days of discharge? Yes   Comments regarding PCP Hospital PCP FOLLOW UP APPOINTMENT IS 12/28/21@145pm   Has the patient kept scheduled appointments due by today? N/A   Has home health visited the patient within 72 hours of discharge? N/A   What DME was ordered? [facility] [employer]--to be delivered to her residence   Has all DME been delivered? No   Pulse Ox monitoring None   Psychosocial issues? No   Did the patient receive a copy of their discharge instructions? Yes   Nursing interventions Reviewed instructions with patient   What is the patient's perception of their health status since discharge? Same  [\"So much better\".  Denies CP, soa/edema resolved--following her diet, occasional cough as charted above, constipation " resolved.  Falls prevention rev'd as she started on multiple new meds-enc'd to monitor/record BP's if possible. Elevating feet/legs prn]   Nursing interventions Nurse provided patient education  [Pt very receptive to education---REv'd s/s of CHF exac--enc'd compliance with diet,daily weights, medications and f/u appts.]   Is the patient weighing daily? No  [Reviewed weight gain parameters of 3# day/5 # week require MD notification-encouraged to weigh daily and record to take to MD appts for review---v/u]   Does the patient have scales? No  [Pt is going to purchase a scale]   Daily weight interventions Education provided on importance of daily weight   Is the patient able to teach back Heart Failure diet management? Yes  [Discussed with patient--]   Is the patient able to teach back Heart Failure Zones? No  [Learning about heart failure]   Is the patient able to teach back signs and symptoms of worsening condition? (i.e. weight gain, shortness of air, etc.) Yes  [REviewed and will need ongoing reinforcement]   If the patient is a current smoker, are they able to teach back resources for cessation? Not a smoker   Is the patient/caregiver able to teach back the hierarchy of who to call/visit for symptoms/problems? PCP, Specialist, Home health nurse, Urgent Care, ED, 911 Yes   TCM call completed? Yes           Mindy Cardozo RN    12/22/2021, 12:08 EST

## 2021-12-23 ENCOUNTER — PATIENT OUTREACH (OUTPATIENT)
Dept: CASE MANAGEMENT | Facility: OTHER | Age: 76
End: 2021-12-23

## 2021-12-23 DIAGNOSIS — K59.00 CONSTIPATION, UNSPECIFIED CONSTIPATION TYPE: Primary | ICD-10-CM

## 2021-12-23 DIAGNOSIS — I48.91 ATRIAL FIBRILLATION, UNSPECIFIED TYPE (HCC): ICD-10-CM

## 2021-12-23 DIAGNOSIS — R06.02 SHORTNESS OF BREATH: ICD-10-CM

## 2021-12-23 DIAGNOSIS — I10 HYPERTENSION, ESSENTIAL: ICD-10-CM

## 2021-12-23 NOTE — OUTREACH NOTE
Ambulatory Case Management Note       I called patient she denied any SOA stated lite pain in right side. Stated that she was taking two type of stool softeners and stated her BM was very thin. I talked with PCP he suggested that she stop both stool softeners and if consti[pation returned to take only miralax . Patient verbalized understanding. Patient was asked to report to ED if condition worsens and she verbalized understanding.   There are no recently modified care plans to display for this patient.      Alfredo Rdz MA  Ambulatory Case Management    12/23/2021, 14:23 EST

## 2021-12-28 ENCOUNTER — OFFICE VISIT (OUTPATIENT)
Dept: INTERNAL MEDICINE | Facility: CLINIC | Age: 76
End: 2021-12-28

## 2021-12-28 VITALS
OXYGEN SATURATION: 96 % | TEMPERATURE: 97.2 F | HEART RATE: 87 BPM | HEIGHT: 63 IN | DIASTOLIC BLOOD PRESSURE: 73 MMHG | WEIGHT: 233.2 LBS | SYSTOLIC BLOOD PRESSURE: 107 MMHG | BODY MASS INDEX: 41.32 KG/M2

## 2021-12-28 DIAGNOSIS — E55.9 VITAMIN D DEFICIENCY: ICD-10-CM

## 2021-12-28 DIAGNOSIS — I50.21 ACUTE SYSTOLIC CONGESTIVE HEART FAILURE (HCC): ICD-10-CM

## 2021-12-28 DIAGNOSIS — M17.0 PRIMARY OSTEOARTHRITIS OF BOTH KNEES: ICD-10-CM

## 2021-12-28 DIAGNOSIS — I48.91 ATRIAL FIBRILLATION, UNSPECIFIED TYPE (HCC): ICD-10-CM

## 2021-12-28 DIAGNOSIS — F41.1 ANXIETY, GENERALIZED: ICD-10-CM

## 2021-12-28 DIAGNOSIS — E66.01 CLASS 3 SEVERE OBESITY DUE TO EXCESS CALORIES WITH SERIOUS COMORBIDITY AND BODY MASS INDEX (BMI) OF 40.0 TO 44.9 IN ADULT (HCC): ICD-10-CM

## 2021-12-28 DIAGNOSIS — R73.01 IFG (IMPAIRED FASTING GLUCOSE): ICD-10-CM

## 2021-12-28 DIAGNOSIS — F32.0 CURRENT MILD EPISODE OF MAJOR DEPRESSIVE DISORDER, UNSPECIFIED WHETHER RECURRENT (HCC): ICD-10-CM

## 2021-12-28 DIAGNOSIS — E78.2 MIXED HYPERLIPIDEMIA: ICD-10-CM

## 2021-12-28 DIAGNOSIS — I10 HYPERTENSION, ESSENTIAL: Primary | ICD-10-CM

## 2021-12-28 DIAGNOSIS — R06.02 SHORTNESS OF BREATH ON EXERTION: ICD-10-CM

## 2021-12-28 PROBLEM — E66.813 CLASS 3 SEVERE OBESITY DUE TO EXCESS CALORIES WITH SERIOUS COMORBIDITY AND BODY MASS INDEX (BMI) OF 40.0 TO 44.9 IN ADULT: Status: ACTIVE | Noted: 2021-12-28

## 2021-12-28 PROCEDURE — 1111F DSCHRG MED/CURRENT MED MERGE: CPT | Performed by: INTERNAL MEDICINE

## 2021-12-28 PROCEDURE — 99495 TRANSJ CARE MGMT MOD F2F 14D: CPT | Performed by: INTERNAL MEDICINE

## 2021-12-28 RX ORDER — LIDOCAINE 50 MG/G
1 PATCH TOPICAL EVERY 24 HOURS
Qty: 30 PATCH | Refills: 5 | Status: SHIPPED | OUTPATIENT
Start: 2021-12-28 | End: 2022-05-31

## 2021-12-28 RX ORDER — TRAMADOL HYDROCHLORIDE 50 MG/1
50 TABLET ORAL EVERY 6 HOURS PRN
Qty: 60 TABLET | Refills: 5 | Status: SHIPPED | OUTPATIENT
Start: 2021-12-28 | End: 2022-07-20

## 2021-12-28 NOTE — PROGRESS NOTES
"Chief Complaint/ HPI: Transitional care visit today patient is here with her son for hospital stay December 17 through December 21, 2021 with acute systolic heart failure, shortness of breath and back pain, hospitalization reviewed medications noted, discharge summary reviewed    She did not qualify for oxygen at time of discharge,      Objective   Vital Signs  Vitals:    12/28/21 1421   BP: 107/73   Pulse: 87   Temp: 97.2 °F (36.2 °C)   SpO2: 96%   Weight: 106 kg (233 lb 3.2 oz)   Height: 160 cm (62.99\")      Body mass index is 41.32 kg/m².  Review of Systems low back pain  Physical Exam lungs are clear to the bases posterior, cardiac exam reveals an irregularly irregular rhythm heart rates around 95 soft heart tones neck is supple lower extremities no edema mid back shows no rash, no gross deformities of the low back or mid back area, patient is alert and oriented x3, ambulatory with a rolling walker at home    Result Review :   Lab Results   Component Value Date    PROBNP 1,036.0 12/21/2021    PROBNP 3,214.0 (H) 12/17/2021     CMP    CMP 12/18/21 12/19/21 12/21/21   Glucose 96 99 97   BUN 12 14 19   Creatinine 1.02 (A) 1.00 1.09 (A)   eGFR Non African Am 52 (A) 54 (A) 49 (A)   Sodium 143 141 135 (A)   Potassium 4.0 3.7 4.0   Chloride 103 95 (A) 96 (A)   Calcium 9.6 9.4 9.7   Albumin   3.50   Total Bilirubin   1.1   Alkaline Phosphatase   49   AST (SGOT)   15   ALT (SGPT)   10   (A) Abnormal value            CBC w/diff    CBC w/Diff 12/17/21 12/18/21 12/21/21 12/21/21      0453 0453   WBC 8.18 6.73 5.57 5.96   RBC 3.85 3.72 (A) 3.82 3.84   Hemoglobin 12.6 12.2 12.6 12.2   Hematocrit 37.1 37.5 40.7 37.4   MCV 96.4 100.8 (A) 106.5 (A) 97.4 (A)   MCH 32.7 32.8 33.0 31.8   MCHC 34.0 32.5 31.0 (A) 32.6   RDW 14.8 15.0 14.3 13.4   Platelets 201 190 175 180   Neutrophil Rel % 76.7 (A) 69.2 59.1 60.3   Immature Granulocyte Rel % 0.2 0.3 0.2 0.2   Lymphocyte Rel % 13.7 (A) 19.8 24.4 23.7   Monocyte Rel % 8.4 8.3 12.7 (A) " 11.9   Eosinophil Rel % 0.6 1.8 2.9 3.2   Basophil Rel % 0.4 0.6 0.7 0.7   (A) Abnormal value             Lipid Panel    Lipid Panel 9/3/21   Total Cholesterol 150   Triglycerides 137   HDL Cholesterol 41   VLDL Cholesterol 24   LDL Cholesterol  85   LDL/HDL Ratio 1.99            Lab Results   Component Value Date    TSH 3.940 12/21/2021    TSH 1.850 07/13/2021    TSH 3.500 02/08/2021      Lab Results   Component Value Date    FREET4 1.47 07/13/2021      A1C Last 3 Results    HGBA1C Last 3 Results 9/3/21 12/21/21   Hemoglobin A1C 5.38 5.20                             Visit Diagnoses:    ICD-10-CM ICD-9-CM   1. Hypertension, essential  I10 401.9   2. Mixed hyperlipidemia  E78.2 272.2   3. Class 3 severe obesity due to excess calories with serious comorbidity and body mass index (BMI) of 40.0 to 44.9 in adult ([facility])  E66.01 278.01    Z68.41 V85.41   4. Current mild episode of major depressive disorder, unspecified whether recurrent ([facility])  F32.0 296.21   5. Vitamin D deficiency  E55.9 268.9   6. Primary osteoarthritis of both knees  M17.0 715.16   7. Anxiety, generalized  F41.1 300.02   8. Acute systolic congestive heart failure ([facility])  I50.21 428.21     428.0   9. Shortness of breath on exertion  R06.02 786.05   10. Atrial fibrillation, unspecified type ([facility])  I48.91 427.31       Assessment and Plan   Diagnoses and all orders for this visit:    1. Hypertension, essential (Primary)    2. Mixed hyperlipidemia    3. Class 3 severe obesity due to excess calories with serious comorbidity and body mass index (BMI) of 40.0 to 44.9 in adult ([facility])    4. Current mild episode of major depressive disorder, unspecified whether recurrent ([facility])    5. Vitamin D deficiency    6. Primary osteoarthritis of both knees    7. Anxiety, generalized    8. Acute systolic congestive heart failure ([facility])    9. Shortness of breath on exertion    10. Atrial fibrillation, unspecified type ([facility])    Midthoracic back pain worsening, December 28, 2021  discussions with patient's son about treatment work-up etc.--- been using over-the-counter Tylenol pain medication as well as pain management in [place], had a x-ray done at that facility and has had injections in her back, treatment options to include a Lidoderm patch, would avoid anti-inflammatories due to congestive heart failure and and on blood thinners etc. we will give patient tramadol for pain today December 28, 2021    1.  New onset acute CHF.  Patient had echo on 2/5/2021 which revealed EF 55 to 60%.  with mild MR as well, study was technically difficult. At that time there was not any significant LVH noted on that echo either. Most likely related to new onset A. Fib = tachycardia induced cardiomyopathy,   --> current Echo came back with EF 35 to 40% with moderate global hypokinesis.,  Technically difficult study, moderately dilated left atrium and right atrium moderate mitral annular calcification with thickened leaflets and mild to moderate mitral stenosis minimal prolapse of the anterior mitral leaflet mild aortic sclerosis with trivial aortic regurgitation mild right ventricular pressure elevations at 44 mmHg dilated IVC consistent with elevated central venous pressures moderate size left pleural effusion trivial pericardial effusion------ as noted this is new in just the past 10 months, patient will need either stress imaging or catheterization.  defer to cardiology on this.  She is diuresing, so we will continue Lasix 40 mg twice daily, potassium pills twice a day lisinopril 5 mg daily, spironolactone 25 mg daily, Eliquis 5 mg twice daily, digoxin 0.125 mg daily, carvedilol 6.25 mg twice a day --------------------------- BR NP level at 3200 on admission, , down to 1000 on the day of discharge with a potassium of 4.0 BUN 19 creatinine of 1.0,      2.  New onset atrial fibrillation.  Continue on carvedilol, 6.25 twice daily, digoxin 0.125 mg daily, Eliquis 5 mg twice daily     3. hypertension.   Continue lisinopril 5 mg daily, Aldactone 25 mg daily, carvedilol,, 6.25 mg twice daily, Lasix 40 mg twice a day diuretics,     4.  Hypoxia.  --stable on 1 to 2 L per nasal cannula 12/19.  Have asked nursing staff to do a 6-minute walk test to see if she needs oxygen at home--- did not need oxygen did not qualify at time of discharge     5.  Bilateral pleural effusions. CT chest December 10 shows bilateral pleural effusions, compressive atelectasis and pulmonary edema----, x-ray December 20 showed small pleural effusion so we will not attempt thoracentesis at this time, patient can follow-up with repeat chest x-rays in several weeks after aggressive diuretic regimen-----------------, repeat CT scan December 17 shows no change moderate pulmonary edema bilateral pleural effusions of moderate degree most likely cardiac in nature    Impaired fasting glucose    Insomnia, continues on temazepam 15 mg nightly as needed    Left total knee arthroplasty January 2014, [facility], right total knee arthroplasty 2013    Vitamin D deficiency continues on replacement therapy,    Cologuard testing negative, January 2020    Breast cancer left mastectomy January 2011    Abdominal pains recent colonoscopy July 2021 no obvious lesions, Dr. Perez      Follow Up   Return in about 3 months (around 3/28/2022).  Patient was given instructions and counseling regarding her condition or for health maintenance advice. Please see specific information pulled into the AVS if appropriate.

## 2021-12-30 ENCOUNTER — APPOINTMENT (OUTPATIENT)
Dept: NUCLEAR MEDICINE | Facility: HOSPITAL | Age: 76
End: 2021-12-30

## 2021-12-31 LAB — QT INTERVAL: 330 MS

## 2022-01-03 ENCOUNTER — READMISSION MANAGEMENT (OUTPATIENT)
Dept: CALL CENTER | Facility: HOSPITAL | Age: 77
End: 2022-01-03

## 2022-01-03 NOTE — OUTREACH NOTE
CHF Week 2 Survey      Responses   [facility] patient discharged from? [facility]   Does the patient have one of the following disease processes/diagnoses(primary or secondary)? CHF   Week 2 attempt successful? Yes   Call start time 1438   Call end time 1635   Discharge diagnosis   New onset acute CHF,    New onset atrial fibrillation   Person spoke with today (if not patient) and relationship Bella Melendezs reviewed with patient/caregiver? Yes   Is the patient taking all medications as directed (includes completed medication regime)? Yes   Medication comments Pt reports the digoxin is causing her vision issues and head aches when taking medications.  I sent message via Epic chat to provider.  Dr. Nazario advised stop Digoxin for now.  Called patient back and updated.  she will follow advice and will discuss this with cardio on appt. in 2 days.    Does the patient have a primary care provider?  Yes   Does the patient have an appointment with their PCP within 7 days of discharge? Yes   Has the patient kept scheduled appointments due by today? Yes   Pulse Ox monitoring None   Psychosocial issues? No   Did the patient receive a copy of their discharge instructions? Yes   Nursing interventions Reviewed instructions with patient   What is the patient's perception of their health status since discharge? Improving   CHF Week 2 call completed? Yes   Wrap up additional comments Patient improving.  denies needs at end ofcall.          Vanessa Franco RN

## 2022-01-05 PROBLEM — R73.01 IFG (IMPAIRED FASTING GLUCOSE): Status: RESOLVED | Noted: 2021-09-09 | Resolved: 2022-01-05

## 2022-01-05 PROBLEM — J30.2 SEASONAL ALLERGIC RHINITIS: Status: ACTIVE | Noted: 2022-01-05

## 2022-01-05 PROBLEM — K59.00 CONSTIPATION: Status: RESOLVED | Noted: 2021-10-14 | Resolved: 2022-01-05

## 2022-01-05 PROBLEM — I50.9 ACUTE EXACERBATION OF CHF (CONGESTIVE HEART FAILURE) (HCC): Status: RESOLVED | Noted: 2021-12-17 | Resolved: 2022-01-05

## 2022-01-05 PROBLEM — R06.02 SHORTNESS OF BREATH ON EXERTION: Status: RESOLVED | Noted: 2021-12-10 | Resolved: 2022-01-05

## 2022-01-05 PROBLEM — Z12.31 SCREENING MAMMOGRAM, ENCOUNTER FOR: Status: RESOLVED | Noted: 2021-09-09 | Resolved: 2022-01-05

## 2022-01-05 PROBLEM — M19.90 ARTHRITIS: Status: RESOLVED | Noted: 2022-01-05 | Resolved: 2022-01-05

## 2022-01-05 PROBLEM — I50.21 ACUTE SYSTOLIC CONGESTIVE HEART FAILURE: Status: RESOLVED | Noted: 2021-12-21 | Resolved: 2022-01-05

## 2022-01-05 PROBLEM — R19.7 DIARRHEA: Status: RESOLVED | Noted: 2021-10-14 | Resolved: 2022-01-05

## 2022-01-05 PROBLEM — K57.32 DIVERTICULITIS OF LARGE INTESTINE WITHOUT PERFORATION OR ABSCESS WITHOUT BLEEDING: Status: RESOLVED | Noted: 2021-09-09 | Resolved: 2022-01-05

## 2022-01-05 PROBLEM — Z80.0 FAMILY HISTORY OF COLON CANCER: Status: RESOLVED | Noted: 2021-10-14 | Resolved: 2022-01-05

## 2022-01-05 PROBLEM — I48.19 ATRIAL FIBRILLATION, PERSISTENT: Status: ACTIVE | Noted: 2021-12-10

## 2022-01-05 PROBLEM — M19.90 ARTHRITIS: Status: ACTIVE | Noted: 2022-01-05

## 2022-01-05 PROBLEM — R19.4 ALTERED BOWEL HABITS: Status: RESOLVED | Noted: 2021-10-14 | Resolved: 2022-01-05

## 2022-01-12 ENCOUNTER — READMISSION MANAGEMENT (OUTPATIENT)
Dept: CALL CENTER | Facility: HOSPITAL | Age: 77
End: 2022-01-12

## 2022-01-12 NOTE — OUTREACH NOTE
CHF Week 3 Survey      Responses   [facility] patient discharged from? [facility]   Does the patient have one of the following disease processes/diagnoses(primary or secondary)? CHF   Week 3 attempt successful? Yes   Call start time 1616   Call end time 1620   Discharge diagnosis   New onset acute CHF,    New onset atrial fibrillation   Meds reviewed with patient/caregiver? Yes   Is the patient taking all medications as directed (includes completed medication regime)? Yes   Does the patient have a primary care provider?  Yes   Comments regarding PCP saw PCP on 12/28   Has the patient kept scheduled appointments due by today? Yes   Has home health visited the patient within 72 hours of discharge? N/A   Psychosocial issues? No   What is the patient's perception of their health status since discharge? Improving   Nursing interventions Nurse provided patient education   Is the patient weighing daily? No   Does the patient have scales? No  [says she has scales ordered and they are on the way]   Is the patient able to teach back signs and symptoms of worsening condition? (i.e. weight gain, shortness of air, etc.) Yes   Is the patient/caregiver able to teach back the hierarchy of who to call/visit for symptoms/problems? PCP, Specialist, Home health nurse, Urgent Care, ED, 911 Yes   CHF Week 3 call completed? Yes   Wrap up additional comments Says she is doing ok but thinks she is taking to much medication, she has made several medication changes on her own, states she tried to call PCP office 2 times today, advised her to keep calling PCP office and that I would send a message for her.          Lydia Galloway RN

## 2022-01-13 ENCOUNTER — TELEPHONE (OUTPATIENT)
Dept: INTERNAL MEDICINE | Facility: CLINIC | Age: 77
End: 2022-01-13

## 2022-01-13 NOTE — TELEPHONE ENCOUNTER
"Patient called and said that she takes potassium chloride. States she takes 2 pills BID but states that it is giving her diarrhea so shes cut back on it and shes only taking 1 pill BID.     She also states that efren put her on the metoprolol twice daily but she says when she takes it at night it causes her to black out and makes her feel \"crazy\" so she said that shes only been taking it in the morning.  "

## 2022-01-21 ENCOUNTER — READMISSION MANAGEMENT (OUTPATIENT)
Dept: CALL CENTER | Facility: HOSPITAL | Age: 77
End: 2022-01-21

## 2022-01-21 NOTE — OUTREACH NOTE
CHF Week 4 Survey      Responses   [facility] patient discharged from? [facility]   Does the patient have one of the following disease processes/diagnoses(primary or secondary)? CHF   Week 4 attempt successful? Yes   Call start time 1042   Call end time 1045   Discharge diagnosis   New onset acute CHF,    New onset atrial fibrillation   Medication alerts for this patient Pt now only taking Digoxin once a day.    Meds reviewed with patient/caregiver? Yes   Is the patient having any side effects they believe may be caused by any medication additions or changes? No   Is the patient taking all medications as directed (includes completed medication regime)? Yes   Has the patient kept scheduled appointments due by today? Yes   Pulse Ox monitoring None   Psychosocial issues? No   Comments Pt denies edema and reports SOA has improved.    What is the patient's perception of their health status since discharge? Improving   Is the patient weighing daily? Yes   Does the patient have scales? Yes   Daily weight interventions Education provided on importance of daily weight   Is the patient able to teach back Heart Failure diet management? Yes   Is the patient able to teach back Heart Failure Zones? Yes   Week 4 Call Completed? Yes   Would the patient like one additional call? No   Graduated Yes   Is the patient interested in additional calls from an ambulatory ?  NOTE:  applies to high risk patients requiring additional follow-up. No   Did the patient feel the follow up calls were helpful during their recovery period? Yes   Was the number of calls appropriate? Yes          Jazmyn Bajwa RN

## 2022-02-02 DIAGNOSIS — I34.0 MODERATE MITRAL REGURGITATION: Primary | ICD-10-CM

## 2022-03-04 ENCOUNTER — LAB (OUTPATIENT)
Dept: LAB | Facility: HOSPITAL | Age: 77
End: 2022-03-04

## 2022-03-04 DIAGNOSIS — E78.2 MIXED HYPERLIPIDEMIA: ICD-10-CM

## 2022-03-04 DIAGNOSIS — R06.02 SHORTNESS OF BREATH ON EXERTION: ICD-10-CM

## 2022-03-04 DIAGNOSIS — I10 HYPERTENSION, ESSENTIAL: ICD-10-CM

## 2022-03-04 DIAGNOSIS — K57.32 DIVERTICULITIS OF LARGE INTESTINE WITHOUT PERFORATION OR ABSCESS WITHOUT BLEEDING: ICD-10-CM

## 2022-03-04 DIAGNOSIS — E55.9 VITAMIN D DEFICIENCY, UNSPECIFIED: ICD-10-CM

## 2022-03-04 DIAGNOSIS — R73.01 IFG (IMPAIRED FASTING GLUCOSE): ICD-10-CM

## 2022-03-04 DIAGNOSIS — E55.9 VITAMIN D DEFICIENCY: ICD-10-CM

## 2022-03-04 DIAGNOSIS — I48.91 ATRIAL FIBRILLATION, UNSPECIFIED TYPE: ICD-10-CM

## 2022-03-04 DIAGNOSIS — I50.21 ACUTE SYSTOLIC CONGESTIVE HEART FAILURE: ICD-10-CM

## 2022-03-04 DIAGNOSIS — F32.0 CURRENT MILD EPISODE OF MAJOR DEPRESSIVE DISORDER, UNSPECIFIED WHETHER RECURRENT: ICD-10-CM

## 2022-03-04 DIAGNOSIS — E66.01 CLASS 3 SEVERE OBESITY DUE TO EXCESS CALORIES WITH SERIOUS COMORBIDITY AND BODY MASS INDEX (BMI) OF 40.0 TO 44.9 IN ADULT: ICD-10-CM

## 2022-03-04 DIAGNOSIS — M17.0 PRIMARY OSTEOARTHRITIS OF BOTH KNEES: ICD-10-CM

## 2022-03-04 DIAGNOSIS — F41.1 ANXIETY, GENERALIZED: ICD-10-CM

## 2022-03-04 LAB
ALBUMIN SERPL-MCNC: 4.1 G/DL (ref 3.5–5.2)
ALBUMIN/GLOB SERPL: 1.3 G/DL
ALP SERPL-CCNC: 57 U/L (ref 39–117)
ALT SERPL W P-5'-P-CCNC: 12 U/L (ref 1–33)
ANION GAP SERPL CALCULATED.3IONS-SCNC: 13.3 MMOL/L (ref 5–15)
AST SERPL-CCNC: 20 U/L (ref 1–32)
BASOPHILS # BLD AUTO: 0.05 10*3/MM3 (ref 0–0.2)
BASOPHILS NFR BLD AUTO: 0.5 % (ref 0–1.5)
BILIRUB SERPL-MCNC: 0.7 MG/DL (ref 0–1.2)
BUN SERPL-MCNC: 18 MG/DL (ref 8–23)
BUN/CREAT SERPL: 12 (ref 7–25)
CALCIUM SPEC-SCNC: 10.3 MG/DL (ref 8.6–10.5)
CHLORIDE SERPL-SCNC: 97 MMOL/L (ref 98–107)
CHOLEST SERPL-MCNC: 174 MG/DL (ref 0–200)
CO2 SERPL-SCNC: 25.7 MMOL/L (ref 22–29)
CREAT SERPL-MCNC: 1.5 MG/DL (ref 0.57–1)
DEPRECATED RDW RBC AUTO: 48.3 FL (ref 37–54)
EGFRCR SERPLBLD CKD-EPI 2021: 35.3 ML/MIN/1.73
EOSINOPHIL # BLD AUTO: 0.23 10*3/MM3 (ref 0–0.4)
EOSINOPHIL NFR BLD AUTO: 2.5 % (ref 0.3–6.2)
ERYTHROCYTE [DISTWIDTH] IN BLOOD BY AUTOMATED COUNT: 13.4 % (ref 12.3–15.4)
GLOBULIN UR ELPH-MCNC: 3.1 GM/DL
GLUCOSE SERPL-MCNC: 119 MG/DL (ref 65–99)
HBA1C MFR BLD: 5.7 % (ref 4.8–5.6)
HCT VFR BLD AUTO: 40.1 % (ref 34–46.6)
HDLC SERPL-MCNC: 37 MG/DL (ref 40–60)
HGB BLD-MCNC: 13.3 G/DL (ref 12–15.9)
IMM GRANULOCYTES # BLD AUTO: 0.03 10*3/MM3 (ref 0–0.05)
IMM GRANULOCYTES NFR BLD AUTO: 0.3 % (ref 0–0.5)
LDLC SERPL CALC-MCNC: 105 MG/DL (ref 0–100)
LDLC/HDLC SERPL: 2.72 {RATIO}
LYMPHOCYTES # BLD AUTO: 2.1 10*3/MM3 (ref 0.7–3.1)
LYMPHOCYTES NFR BLD AUTO: 23 % (ref 19.6–45.3)
MCH RBC QN AUTO: 32.2 PG (ref 26.6–33)
MCHC RBC AUTO-ENTMCNC: 33.2 G/DL (ref 31.5–35.7)
MCV RBC AUTO: 97.1 FL (ref 79–97)
MONOCYTES # BLD AUTO: 0.9 10*3/MM3 (ref 0.1–0.9)
MONOCYTES NFR BLD AUTO: 9.9 % (ref 5–12)
NEUTROPHILS NFR BLD AUTO: 5.82 10*3/MM3 (ref 1.7–7)
NEUTROPHILS NFR BLD AUTO: 63.8 % (ref 42.7–76)
NRBC BLD AUTO-RTO: 0 /100 WBC (ref 0–0.2)
NT-PROBNP SERPL-MCNC: 1967 PG/ML (ref 0–1800)
PLATELET # BLD AUTO: 238 10*3/MM3 (ref 140–450)
PMV BLD AUTO: 11.7 FL (ref 6–12)
POTASSIUM SERPL-SCNC: 4.5 MMOL/L (ref 3.5–5.2)
PROT SERPL-MCNC: 7.2 G/DL (ref 6–8.5)
RBC # BLD AUTO: 4.13 10*6/MM3 (ref 3.77–5.28)
SODIUM SERPL-SCNC: 136 MMOL/L (ref 136–145)
T4 FREE SERPL-MCNC: 1.68 NG/DL (ref 0.93–1.7)
TRIGL SERPL-MCNC: 181 MG/DL (ref 0–150)
TSH SERPL DL<=0.05 MIU/L-ACNC: 2.5 UIU/ML (ref 0.27–4.2)
VLDLC SERPL-MCNC: 32 MG/DL (ref 5–40)
WBC NRBC COR # BLD: 9.13 10*3/MM3 (ref 3.4–10.8)

## 2022-03-04 PROCEDURE — 84439 ASSAY OF FREE THYROXINE: CPT

## 2022-03-04 PROCEDURE — 83036 HEMOGLOBIN GLYCOSYLATED A1C: CPT

## 2022-03-04 PROCEDURE — 80061 LIPID PANEL: CPT

## 2022-03-04 PROCEDURE — 84443 ASSAY THYROID STIM HORMONE: CPT

## 2022-03-04 PROCEDURE — 83880 ASSAY OF NATRIURETIC PEPTIDE: CPT

## 2022-03-04 PROCEDURE — 85025 COMPLETE CBC W/AUTO DIFF WBC: CPT

## 2022-03-04 PROCEDURE — 80053 COMPREHEN METABOLIC PANEL: CPT

## 2022-03-04 PROCEDURE — 36415 COLL VENOUS BLD VENIPUNCTURE: CPT

## 2022-03-21 ENCOUNTER — OFFICE VISIT (OUTPATIENT)
Dept: INTERNAL MEDICINE | Facility: CLINIC | Age: 77
End: 2022-03-21

## 2022-03-21 VITALS
HEART RATE: 81 BPM | OXYGEN SATURATION: 93 % | WEIGHT: 224.4 LBS | TEMPERATURE: 97.9 F | HEIGHT: 63 IN | SYSTOLIC BLOOD PRESSURE: 82 MMHG | BODY MASS INDEX: 39.76 KG/M2 | DIASTOLIC BLOOD PRESSURE: 48 MMHG

## 2022-03-21 DIAGNOSIS — F32.0 CURRENT MILD EPISODE OF MAJOR DEPRESSIVE DISORDER, UNSPECIFIED WHETHER RECURRENT: ICD-10-CM

## 2022-03-21 DIAGNOSIS — E06.3 HYPOTHYROIDISM DUE TO HASHIMOTO'S THYROIDITIS: ICD-10-CM

## 2022-03-21 DIAGNOSIS — E55.9 VITAMIN D DEFICIENCY: ICD-10-CM

## 2022-03-21 DIAGNOSIS — G93.32 CHRONIC FATIGUE SYNDROME: ICD-10-CM

## 2022-03-21 DIAGNOSIS — E66.01 CLASS 3 SEVERE OBESITY DUE TO EXCESS CALORIES WITH SERIOUS COMORBIDITY AND BODY MASS INDEX (BMI) OF 40.0 TO 44.9 IN ADULT: Primary | ICD-10-CM

## 2022-03-21 DIAGNOSIS — E03.8 HYPOTHYROIDISM DUE TO HASHIMOTO'S THYROIDITIS: ICD-10-CM

## 2022-03-21 DIAGNOSIS — I10 ESSENTIAL HYPERTENSION: ICD-10-CM

## 2022-03-21 DIAGNOSIS — I48.19 ATRIAL FIBRILLATION, PERSISTENT: ICD-10-CM

## 2022-03-21 DIAGNOSIS — E78.2 MIXED HYPERLIPIDEMIA: ICD-10-CM

## 2022-03-21 DIAGNOSIS — R73.01 IFG (IMPAIRED FASTING GLUCOSE): ICD-10-CM

## 2022-03-21 DIAGNOSIS — I50.21 ACUTE SYSTOLIC CONGESTIVE HEART FAILURE: ICD-10-CM

## 2022-03-21 DIAGNOSIS — F41.1 ANXIETY, GENERALIZED: ICD-10-CM

## 2022-03-21 PROCEDURE — 99214 OFFICE O/P EST MOD 30 MIN: CPT | Performed by: INTERNAL MEDICINE

## 2022-03-21 RX ORDER — AMOXICILLIN 250 MG
1 CAPSULE ORAL AS NEEDED
Qty: 30 TABLET | Refills: 5 | Status: SHIPPED | OUTPATIENT
Start: 2022-03-21 | End: 2022-05-31

## 2022-03-21 NOTE — PROGRESS NOTES
"Chief Complaint/ HPI: Follow-up with blood pressure concerns finding it being low at times, also with back pain issues congestive heart failure back 1 year ago, here with her son today, also here because of underlying atrial fibrillation and blood pressure issues, arthritis issues,    No falls      Objective   Vital Signs  Vitals:    03/21/22 1435   BP: (!) 82/48   BP Location: Right arm   Patient Position: Sitting   Pulse: 81   Temp: 97.9 °F (36.6 °C)   SpO2: 93%   Weight: 102 kg (224 lb 6.4 oz)   Height: 160 cm (62.99\")      Body mass index is 39.76 kg/m².  Review of Systems   HENT: Negative.    Eyes: Negative.    Respiratory: Negative.    Cardiovascular: Negative.    Gastrointestinal: Negative.    Endocrine: Negative.    Genitourinary: Negative.    Musculoskeletal: Negative.    Allergic/Immunologic: Negative.    Neurological: Positive for weakness and light-headedness.   Hematological: Negative.    Psychiatric/Behavioral: Negative.       Physical Exam  Constitutional:       General: She is not in acute distress.     Appearance: Normal appearance. She is obese.   HENT:      Head: Normocephalic.      Mouth/Throat:      Mouth: Mucous membranes are moist.   Eyes:      Conjunctiva/sclera: Conjunctivae normal.      Pupils: Pupils are equal, round, and reactive to light.   Cardiovascular:      Rate and Rhythm: Normal rate and regular rhythm.      Pulses: Normal pulses.      Heart sounds: Normal heart sounds.   Pulmonary:      Effort: Pulmonary effort is normal.      Breath sounds: Normal breath sounds.   Abdominal:      General: Bowel sounds are normal.      Palpations: Abdomen is soft.   Musculoskeletal:         General: No swelling. Normal range of motion.      Cervical back: Neck supple.   Skin:     General: Skin is warm and dry.      Coloration: Skin is not jaundiced.   Neurological:      General: No focal deficit present.      Mental Status: She is alert and oriented to person, place, and time. Mental status is at " baseline.   Psychiatric:         Mood and Affect: Mood normal.         Behavior: Behavior normal.         Thought Content: Thought content normal.         Judgment: Judgment normal.        Result Review :   Lab Results   Component Value Date    PROBNP 1,967.0 (H) 03/04/2022    PROBNP 1,036.0 12/21/2021    PROBNP 3,214.0 (H) 12/17/2021     CMP    CMP 12/19/21 12/21/21 3/4/22   Glucose 99 97 119 (A)   BUN 14 19 18   Creatinine 1.00 1.09 (A) 1.50 (A)   eGFR Non African Am 54 (A) 49 (A)    Sodium 141 135 (A) 136   Potassium 3.7 4.0 4.5   Chloride 95 (A) 96 (A) 97 (A)   Calcium 9.4 9.7 10.3   Albumin  3.50 4.10   Total Bilirubin  1.1 0.7   Alkaline Phosphatase  49 57   AST (SGOT)  15 20   ALT (SGPT)  10 12   (A) Abnormal value            CBC w/diff    CBC w/Diff 12/18/21 12/21/21 12/21/21 3/4/22     0453 0453    WBC 6.73 5.57 5.96 9.13   RBC 3.72 (A) 3.82 3.84 4.13   Hemoglobin 12.2 12.6 12.2 13.3   Hematocrit 37.5 40.7 37.4 40.1   .8 (A) 106.5 (A) 97.4 (A) 97.1 (A)   MCH 32.8 33.0 31.8 32.2   MCHC 32.5 31.0 (A) 32.6 33.2   RDW 15.0 14.3 13.4 13.4   Platelets 190 175 180 238   Neutrophil Rel % 69.2 59.1 60.3 63.8   Immature Granulocyte Rel % 0.3 0.2 0.2 0.3   Lymphocyte Rel % 19.8 24.4 23.7 23.0   Monocyte Rel % 8.3 12.7 (A) 11.9 9.9   Eosinophil Rel % 1.8 2.9 3.2 2.5   Basophil Rel % 0.6 0.7 0.7 0.5   (A) Abnormal value             Lipid Panel    Lipid Panel 9/3/21 3/4/22   Total Cholesterol 150 174   Triglycerides 137 181 (A)   HDL Cholesterol 41 37 (A)   VLDL Cholesterol 24 32   LDL Cholesterol  85 105 (A)   LDL/HDL Ratio 1.99 2.72   (A) Abnormal value             Lab Results   Component Value Date    TSH 2.500 03/04/2022    TSH 3.940 12/21/2021    TSH 1.850 07/13/2021      Lab Results   Component Value Date    FREET4 1.68 03/04/2022    FREET4 1.47 07/13/2021      A1C Last 3 Results    HGBA1C Last 3 Results 9/3/21 12/21/21 3/4/22   Hemoglobin A1C 5.38 5.20 5.70 (A)   (A) Abnormal value                               Visit Diagnoses:    ICD-10-CM ICD-9-CM   1. Class 3 severe obesity due to excess calories with serious comorbidity and body mass index (BMI) of 40.0 to 44.9 in adult ([facility])  E66.01 278.01    Z68.41 V85.41   2. Essential hypertension  I10 401.9   3. Vitamin D deficiency  E55.9 268.9   4. Current mild episode of major depressive disorder, unspecified whether recurrent ([facility])  F32.0 296.21   5. Anxiety, generalized  F41.1 300.02   6. Mixed hyperlipidemia  E78.2 272.2   7. Atrial fibrillation, persistent  I48.19 427.31   8. Chronic fatigue syndrome  R53.82 780.71   9. Hypothyroidism due to Hashimoto's thyroiditis  E03.8 244.8    E06.3 245.2   10. Acute systolic congestive heart failure ([facility])  I50.21 428.21     428.0   11. IFG (impaired fasting glucose)  R73.01 790.21       Assessment and Plan   Diagnoses and all orders for this visit:    1. Class 3 severe obesity due to excess calories with serious comorbidity and body mass index (BMI) of 40.0 to 44.9 in adult ([facility]) (Primary)  -     Hemoglobin A1c; Future  -     Comprehensive Metabolic Panel; Future  -     CBC & Differential; Future  -     proBNP; Future    2. Essential hypertension  -     Hemoglobin A1c; Future  -     Comprehensive Metabolic Panel; Future  -     CBC & Differential; Future  -     proBNP; Future    3. Vitamin D deficiency  -     Hemoglobin A1c; Future  -     Comprehensive Metabolic Panel; Future  -     CBC & Differential; Future  -     proBNP; Future    4. Current mild episode of major depressive disorder, unspecified whether recurrent ([facility])  -     Hemoglobin A1c; Future  -     Comprehensive Metabolic Panel; Future  -     CBC & Differential; Future  -     proBNP; Future    5. Anxiety, generalized  -     Hemoglobin A1c; Future  -     Comprehensive Metabolic Panel; Future  -     CBC & Differential; Future  -     proBNP; Future    6. Mixed hyperlipidemia  -     Hemoglobin A1c; Future  -     Comprehensive Metabolic Panel; Future  -     CBC & Differential;  Future  -     proBNP; Future    7. Atrial fibrillation, persistent  -     Hemoglobin A1c; Future  -     Comprehensive Metabolic Panel; Future  -     CBC & Differential; Future  -     proBNP; Future    8. Chronic fatigue syndrome  -     Hemoglobin A1c; Future  -     Comprehensive Metabolic Panel; Future  -     CBC & Differential; Future  -     proBNP; Future    9. Hypothyroidism due to Hashimoto's thyroiditis  -     Hemoglobin A1c; Future  -     Comprehensive Metabolic Panel; Future  -     CBC & Differential; Future  -     proBNP; Future    10. Acute systolic congestive heart failure (HCC)  -     Hemoglobin A1c; Future  -     Comprehensive Metabolic Panel; Future  -     CBC & Differential; Future  -     proBNP; Future    11. IFG (impaired fasting glucose)  -     Hemoglobin A1c; Future  -     Comprehensive Metabolic Panel; Future  -     CBC & Differential; Future  -     proBNP; Future        Midthoracic back pain worsening, December 28, 2021 discussions with patient's son about treatment work-up etc.--- been using over-the-counter Tylenol pain medication as well as pain management in [place], had a x-ray done at that facility and has had injections in her back, treatment options to include a Lidoderm patch, would avoid anti-inflammatories due to congestive heart failure and and on blood thinners etc. we will give patient tramadol for pain today December 28, 2021    Constipation issues discussed treatment options something she can get over-the-counter, March 2022    Cerumen impactions bilateral partially removed today March 2022    CKD, stage IIIa, creatinine up to 1.5 from 1.0 in December, 2021-- through March 2022    New onset acute CHF.  Patient had echo on 2/5/2021 which revealed EF 55 to 60%.  with mild MR as well, study was technically difficult. At that time there was not any significant LVH noted on that echo either. Most likely related to new onset A. Fib = tachycardia induced cardiomyopathy,    --> current Echo came back with EF 35 to 40% with moderate global hypokinesis.,  Technically difficult study, moderately dilated left atrium and right atrium moderate mitral annular calcification with thickened leaflets and mild to moderate mitral stenosis minimal prolapse of the anterior mitral leaflet mild aortic sclerosis with trivial aortic regurgitation mild right ventricular pressure elevations at 44 mmHg dilated IVC consistent with elevated central venous pressures moderate size left pleural effusion trivial pericardial effusion------ as noted this is new in just the past 10 months, patient will need either stress imaging or catheterization.  defer to cardiology on this.  She is diuresing, so we will continue Lasix 40 mg twice daily, potassium pills twice a day lisinopril 5 mg daily, spironolactone 25 mg daily, Eliquis 5 mg twice daily, digoxin 0.125 mg daily, carvedilol 6.25 mg twice a day --------------------------- BR NP level at 3200 on admission, , down to 1000 on the day of discharge with a potassium of 4.0 BUN 19 creatinine of 1.0,    atrial fibrillation.  November 2021 ---continue on carvedilol, 6.25 twice daily, digoxin 0.125 mg daily, Eliquis 5 mg twice daily     hypertension.  Continue lisinopril 5 mg daily, Aldactone 25 mg daily, carvedilol,, 6.25 mg twice daily, Lasix 40 mg twice a day diuretics,     Hypoxia.  --stable on 1 to 2 L per nasal cannula 12/2021.   did not need oxygen did not qualify at time of discharge December 2021     Bilateral pleural effusions. CT chest December 10 shows bilateral pleural effusions, compressive atelectasis and pulmonary edema----, x-ray December 20 showed small pleural effusion so we will not attempt thoracentesis at this time, patient can follow-up with repeat chest x-rays in several weeks after aggressive diuretic regimen-----------------, repeat CT scan December 17 shows no change moderate pulmonary edema bilateral pleural effusions of moderate degree most  likely cardiac in nature    Impaired fasting glucose    Insomnia, continues on temazepam 15 mg nightly as needed    Left total knee arthroplasty January 2014, [facility], right total knee arthroplasty 2013    Vitamin D deficiency continues on replacement therapy,    Cologuard testing negative, January 2020    Breast cancer left mastectomy January 2011    Abdominal pains recent colonoscopy July 2021 no obvious lesions, Dr. Perez            Follow Up   Return in about 2 months (around 5/21/2022).  Patient was given instructions and counseling regarding her condition or for health maintenance advice. Please see specific information pulled into the AVS if appropriate.

## 2022-03-22 ENCOUNTER — OFFICE VISIT (OUTPATIENT)
Dept: CARDIOLOGY | Facility: CLINIC | Age: 77
End: 2022-03-22

## 2022-03-22 VITALS
OXYGEN SATURATION: 97 % | DIASTOLIC BLOOD PRESSURE: 68 MMHG | BODY MASS INDEX: 39.69 KG/M2 | SYSTOLIC BLOOD PRESSURE: 109 MMHG | HEART RATE: 97 BPM | HEIGHT: 63 IN | WEIGHT: 224 LBS

## 2022-03-22 DIAGNOSIS — R06.02 SHORTNESS OF BREATH: ICD-10-CM

## 2022-03-22 DIAGNOSIS — I48.21 PERMANENT ATRIAL FIBRILLATION: Primary | ICD-10-CM

## 2022-03-22 DIAGNOSIS — I50.32 DIASTOLIC CHF, CHRONIC: ICD-10-CM

## 2022-03-22 PROCEDURE — 99214 OFFICE O/P EST MOD 30 MIN: CPT | Performed by: SPECIALIST

## 2022-03-22 NOTE — PROGRESS NOTES
[facility]  Cardiology progress Note    Patient Name: Bella Brandt  : 1945    CHIEF COMPLAINT  Atrial fibrillation, CHF.      Subjective   Subjective     HISTORY OF PRESENT ILLNESS    Bella Brandt is a 77 y.o. female with history of permanent atrial fibrillation and CHF.  No chest pain or shortness of breath.    Review of Systems:   Constitutional no fever,  no weight loss   Skin no rash   Otolaryngeal no difficulty swallowing   Cardiovascular See HPI   Pulmonary no cough, no sputum production   Gastrointestinal no constipation, no diarrhea   Genitourinary no dysuria, no hematuria   Hematologic no easy bruisability, no abnormal bleeding   Musculoskeletal no muscle pain   Neurologic no dizziness, no falls         Personal History     Social History:  reports that she quit smoking about 55 years ago. Her smoking use included cigarettes. She has never used smokeless tobacco. She reports previous alcohol use. She reports that she does not use drugs.    Home Medications:  Current Outpatient Medications on File Prior to Visit   Medication Sig   • apixaban (Eliquis) 5 MG tablet tablet Take 1 tablet by mouth Every 12 (Twelve) Hours.   • carvedilol (COREG) 6.25 MG tablet Take 1 tablet by mouth 2 (Two) Times a Day With Meals.   • digoxin (Lanoxin) 125 MCG tablet Take 1 tablet by mouth Daily.   • furosemide (Lasix) 40 MG tablet Take 1 tablet by mouth 2 (Two) Times a Day.   • lidocaine (Lidoderm) 5 % Place 1 patch on the skin as directed by provider Daily. Remove & Discard patch within 12 hours or as directed by MD   • lisinopril (PRINIVIL,ZESTRIL) 5 MG tablet Take 1 tablet by mouth Daily.   • Magnesium 500 MG capsule Take 500 mg by mouth Every Other Day.   • pantoprazole (PROTONIX) 40 MG EC tablet Take 1 tablet by mouth Daily.   • polyethylene glycol (MIRALAX) 17 g packet Take 17 g by mouth Daily As Needed (Use if senna-docusate is ineffective).   • potassium chloride (MICRO-K) 10 MEQ CR capsule Take  2 capsules by mouth 2 (Two) Times a Day With Meals.   • sennosides-docusate (Stimulant Laxative) 8.6-50 MG per tablet Take 1 tablet by mouth As Needed for Constipation.   • spironolactone (ALDACTONE) 25 MG tablet Take 1 tablet by mouth Daily.   • traMADol (ULTRAM) 50 MG tablet Take 1 tablet by mouth Every 6 (Six) Hours As Needed for Moderate Pain .   • [DISCONTINUED] apixaban (ELIQUIS) 5 MG tablet tablet Take 1 tablet by mouth 2 (Two) Times a Day.   • [DISCONTINUED] digoxin (Lanoxin) 125 MCG tablet Take 1 tablet by mouth Daily.   • [DISCONTINUED] pantoprazole (PROTONIX) 40 MG EC tablet Take 1 tablet by mouth Daily.     No current facility-administered medications on file prior to visit.     Allergies:  No Known Allergies    Objective    Objective       Vitals:   Temp:  [97.9 °F (36.6 °C)] 97.9 °F (36.6 °C)  Heart Rate:  [81-97] 97  BP: ()/(48-68) 109/68  Body mass index is 39.69 kg/m².     Physical Exam:   Constitutional: Awake, alert, No acute distress    Eyes: PERRLA, sclerae anicteric, no conjunctival injection   HENT: NCAT, mucous membranes moist   Neck: Supple, no thyromegaly, no lymphadenopathy, trachea midline   Respiratory: Clear to auscultation bilaterally, nonlabored respirations    Cardiovascular: RRR, no murmurs or rubs. Palpable pedal pulses bilaterally   Musculoskeletal: No bilateral ankle edema, no cyanosis to extremities   Psychiatric: Appropriate affect, cooperative   Neurologic: Oriented x 3, strength symmetric in all extremities, Cranial Nerves grossly intact to confrontation, speech clear   Skin: No rashes.    Result Review    Result Review:  I have personally reviewed the available results from  [x]  Laboratory  [x]  EKG  [x]  Cardiology  [x]  Medications  [x]  Old records  []  Other:   Procedures  Lab Results   Component Value Date    CHOL 174 03/04/2022    CHOL 150 09/03/2021     Lab Results   Component Value Date    TRIG 181 (H) 03/04/2022    TRIG 137 09/03/2021    TRIG 122 06/24/2020      Lab Results   Component Value Date    HDL 37 (L) 03/04/2022    HDL 41 09/03/2021    HDL 48 06/24/2020     Lab Results   Component Value Date     (H) 03/04/2022    LDL 85 09/03/2021    LDL 73 06/24/2020     Lab Results   Component Value Date    VLDL 32 03/04/2022    VLDL 24 09/03/2021    VLDL 24 06/24/2020     Results for orders placed in visit on 01/05/22    Adult Transthoracic Echo Complete W/ Cont if Necessary Per Protocol    Interpretation Summary  Fibrocalcific mitral and aortic valves.  Normal left ventricular systolic function.  Moderate mitral regurgitation.  Mild to moderate mitral stenosis.  Mild aortic regurgitation.  Mild to moderate tricuspid regurgitation.  Mild pulmonary hypertension.     Impression/Plan:  1.  Paroxysmal atrial fibrillation controlled heart rate: Continue digoxin 125 mcg once a day.  Continue carvedilol 6.25 twice daily for rate control.  Continue Eliquis 5 mg   for stroke prevention.  2.  Chronic diastolic heart failure stable: Continue Lasix 40 mg once a day.  3.  Essential hypertension controlled: Continue carvedilol 6.25 mg twice daily.  Agree with stopping Zestril and spironolactone in view of low blood pressure and worsening renal function.  4.  Moderate mitral stenosis: Asymptomatic         Byron Lopez MD   03/22/22   12:45 EDT

## 2022-04-06 ENCOUNTER — TELEPHONE (OUTPATIENT)
Dept: CASE MANAGEMENT | Facility: OTHER | Age: 77
End: 2022-04-06

## 2022-04-06 NOTE — TELEPHONE ENCOUNTER
After chart review and conversation with patient it was decided that the patient has graduated and will no longer require HRCM services . Patient agreed and was informed that she could reestablish HRCM services if required in the future. PCP advised.

## 2022-05-05 ENCOUNTER — TELEPHONE (OUTPATIENT)
Dept: GASTROENTEROLOGY | Facility: CLINIC | Age: 77
End: 2022-05-05

## 2022-05-05 NOTE — TELEPHONE ENCOUNTER
I left a detailed vm (ok per TYREE) asking if Ms Brandt wishes to get back on the scope schedule for egd/colon that was prev cancelled d/t cardiac issues.    Ms Brandt was scheduled 12/17/21 with dr costa..    Wayne

## 2022-05-25 ENCOUNTER — HOSPITAL ENCOUNTER (OUTPATIENT)
Dept: MAMMOGRAPHY | Facility: HOSPITAL | Age: 77
Discharge: HOME | End: 2022-05-25
Admitting: INTERNAL MEDICINE

## 2022-05-25 ENCOUNTER — LAB (OUTPATIENT)
Dept: LAB | Facility: HOSPITAL | Age: 77
End: 2022-05-25

## 2022-05-25 DIAGNOSIS — E78.2 MIXED HYPERLIPIDEMIA: ICD-10-CM

## 2022-05-25 DIAGNOSIS — R06.02 SHORTNESS OF BREATH ON EXERTION: ICD-10-CM

## 2022-05-25 DIAGNOSIS — G93.32 CHRONIC FATIGUE SYNDROME: ICD-10-CM

## 2022-05-25 DIAGNOSIS — I50.21 ACUTE SYSTOLIC CONGESTIVE HEART FAILURE: ICD-10-CM

## 2022-05-25 DIAGNOSIS — I48.19 ATRIAL FIBRILLATION, PERSISTENT: ICD-10-CM

## 2022-05-25 DIAGNOSIS — I10 ESSENTIAL HYPERTENSION: ICD-10-CM

## 2022-05-25 DIAGNOSIS — E06.3 HYPOTHYROIDISM DUE TO HASHIMOTO'S THYROIDITIS: ICD-10-CM

## 2022-05-25 DIAGNOSIS — E03.8 HYPOTHYROIDISM DUE TO HASHIMOTO'S THYROIDITIS: ICD-10-CM

## 2022-05-25 DIAGNOSIS — E66.01 CLASS 3 SEVERE OBESITY DUE TO EXCESS CALORIES WITH SERIOUS COMORBIDITY AND BODY MASS INDEX (BMI) OF 40.0 TO 44.9 IN ADULT: ICD-10-CM

## 2022-05-25 DIAGNOSIS — E55.9 VITAMIN D DEFICIENCY: ICD-10-CM

## 2022-05-25 DIAGNOSIS — F32.0 CURRENT MILD EPISODE OF MAJOR DEPRESSIVE DISORDER, UNSPECIFIED WHETHER RECURRENT: ICD-10-CM

## 2022-05-25 DIAGNOSIS — R73.01 IFG (IMPAIRED FASTING GLUCOSE): ICD-10-CM

## 2022-05-25 DIAGNOSIS — F41.1 ANXIETY, GENERALIZED: ICD-10-CM

## 2022-05-25 DIAGNOSIS — Z12.31 VISIT FOR SCREENING MAMMOGRAM: ICD-10-CM

## 2022-05-25 LAB
ALBUMIN SERPL-MCNC: 4 G/DL (ref 3.5–5.2)
ALBUMIN/GLOB SERPL: 1.2 G/DL
ALP SERPL-CCNC: 70 U/L (ref 39–117)
ALT SERPL W P-5'-P-CCNC: 8 U/L (ref 1–33)
ANION GAP SERPL CALCULATED.3IONS-SCNC: 10.9 MMOL/L (ref 5–15)
AST SERPL-CCNC: 17 U/L (ref 1–32)
BASOPHILS # BLD AUTO: 0.05 10*3/MM3 (ref 0–0.2)
BASOPHILS NFR BLD AUTO: 0.6 % (ref 0–1.5)
BILIRUB SERPL-MCNC: 0.7 MG/DL (ref 0–1.2)
BUN SERPL-MCNC: 12 MG/DL (ref 8–23)
BUN/CREAT SERPL: 10.6 (ref 7–25)
CALCIUM SPEC-SCNC: 9.9 MG/DL (ref 8.6–10.5)
CHLORIDE SERPL-SCNC: 101 MMOL/L (ref 98–107)
CO2 SERPL-SCNC: 26.1 MMOL/L (ref 22–29)
CREAT SERPL-MCNC: 1.13 MG/DL (ref 0.57–1)
DEPRECATED RDW RBC AUTO: 44.5 FL (ref 37–54)
EGFRCR SERPLBLD CKD-EPI 2021: 49.6 ML/MIN/1.73
EOSINOPHIL # BLD AUTO: 0.23 10*3/MM3 (ref 0–0.4)
EOSINOPHIL NFR BLD AUTO: 2.7 % (ref 0.3–6.2)
ERYTHROCYTE [DISTWIDTH] IN BLOOD BY AUTOMATED COUNT: 12.5 % (ref 12.3–15.4)
GLOBULIN UR ELPH-MCNC: 3.3 GM/DL
GLUCOSE SERPL-MCNC: 115 MG/DL (ref 65–99)
HBA1C MFR BLD: 5.9 % (ref 4.8–5.6)
HCT VFR BLD AUTO: 39.7 % (ref 34–46.6)
HGB BLD-MCNC: 13.2 G/DL (ref 12–15.9)
IMM GRANULOCYTES # BLD AUTO: 0.02 10*3/MM3 (ref 0–0.05)
IMM GRANULOCYTES NFR BLD AUTO: 0.2 % (ref 0–0.5)
LYMPHOCYTES # BLD AUTO: 1.98 10*3/MM3 (ref 0.7–3.1)
LYMPHOCYTES NFR BLD AUTO: 23.1 % (ref 19.6–45.3)
MCH RBC QN AUTO: 32.3 PG (ref 26.6–33)
MCHC RBC AUTO-ENTMCNC: 33.2 G/DL (ref 31.5–35.7)
MCV RBC AUTO: 97.1 FL (ref 79–97)
MONOCYTES # BLD AUTO: 0.69 10*3/MM3 (ref 0.1–0.9)
MONOCYTES NFR BLD AUTO: 8 % (ref 5–12)
NEUTROPHILS NFR BLD AUTO: 5.61 10*3/MM3 (ref 1.7–7)
NEUTROPHILS NFR BLD AUTO: 65.4 % (ref 42.7–76)
NRBC BLD AUTO-RTO: 0 /100 WBC (ref 0–0.2)
NT-PROBNP SERPL-MCNC: 1841 PG/ML (ref 0–1800)
PLATELET # BLD AUTO: 254 10*3/MM3 (ref 140–450)
PMV BLD AUTO: 11.2 FL (ref 6–12)
POTASSIUM SERPL-SCNC: 4.2 MMOL/L (ref 3.5–5.2)
PROT SERPL-MCNC: 7.3 G/DL (ref 6–8.5)
RBC # BLD AUTO: 4.09 10*6/MM3 (ref 3.77–5.28)
SODIUM SERPL-SCNC: 138 MMOL/L (ref 136–145)
WBC NRBC COR # BLD: 8.58 10*3/MM3 (ref 3.4–10.8)

## 2022-05-25 PROCEDURE — 80053 COMPREHEN METABOLIC PANEL: CPT

## 2022-05-25 PROCEDURE — 77063 BREAST TOMOSYNTHESIS BI: CPT

## 2022-05-25 PROCEDURE — 85025 COMPLETE CBC W/AUTO DIFF WBC: CPT

## 2022-05-25 PROCEDURE — 83036 HEMOGLOBIN GLYCOSYLATED A1C: CPT

## 2022-05-25 PROCEDURE — 77067 SCR MAMMO BI INCL CAD: CPT

## 2022-05-25 PROCEDURE — 83880 ASSAY OF NATRIURETIC PEPTIDE: CPT

## 2022-05-25 PROCEDURE — 36415 COLL VENOUS BLD VENIPUNCTURE: CPT

## 2022-05-31 ENCOUNTER — OFFICE VISIT (OUTPATIENT)
Dept: INTERNAL MEDICINE | Facility: CLINIC | Age: 77
End: 2022-05-31

## 2022-05-31 VITALS
BODY MASS INDEX: 37.88 KG/M2 | SYSTOLIC BLOOD PRESSURE: 117 MMHG | DIASTOLIC BLOOD PRESSURE: 70 MMHG | HEART RATE: 93 BPM | HEIGHT: 63 IN | TEMPERATURE: 97.3 F | WEIGHT: 213.8 LBS | OXYGEN SATURATION: 95 %

## 2022-05-31 DIAGNOSIS — R73.01 IFG (IMPAIRED FASTING GLUCOSE): ICD-10-CM

## 2022-05-31 DIAGNOSIS — M17.0 PRIMARY OSTEOARTHRITIS OF BOTH KNEES: ICD-10-CM

## 2022-05-31 DIAGNOSIS — I48.19 ATRIAL FIBRILLATION, PERSISTENT: ICD-10-CM

## 2022-05-31 DIAGNOSIS — I95.2 HYPOTENSION DUE TO DRUGS: Primary | ICD-10-CM

## 2022-05-31 DIAGNOSIS — F32.0 CURRENT MILD EPISODE OF MAJOR DEPRESSIVE DISORDER, UNSPECIFIED WHETHER RECURRENT: ICD-10-CM

## 2022-05-31 DIAGNOSIS — K21.9 GERD WITHOUT ESOPHAGITIS: ICD-10-CM

## 2022-05-31 DIAGNOSIS — E06.3 HYPOTHYROIDISM DUE TO HASHIMOTO'S THYROIDITIS: ICD-10-CM

## 2022-05-31 DIAGNOSIS — G93.32 CHRONIC FATIGUE SYNDROME: ICD-10-CM

## 2022-05-31 DIAGNOSIS — E78.2 MIXED HYPERLIPIDEMIA: ICD-10-CM

## 2022-05-31 DIAGNOSIS — E03.8 HYPOTHYROIDISM DUE TO HASHIMOTO'S THYROIDITIS: ICD-10-CM

## 2022-05-31 DIAGNOSIS — F41.1 ANXIETY, GENERALIZED: ICD-10-CM

## 2022-05-31 DIAGNOSIS — E55.9 VITAMIN D DEFICIENCY: ICD-10-CM

## 2022-05-31 DIAGNOSIS — I50.21 ACUTE SYSTOLIC CONGESTIVE HEART FAILURE: ICD-10-CM

## 2022-05-31 DIAGNOSIS — I10 ESSENTIAL HYPERTENSION: ICD-10-CM

## 2022-05-31 DIAGNOSIS — J30.1 SEASONAL ALLERGIC RHINITIS DUE TO POLLEN: ICD-10-CM

## 2022-05-31 DIAGNOSIS — E66.01 CLASS 3 SEVERE OBESITY DUE TO EXCESS CALORIES WITH SERIOUS COMORBIDITY AND BODY MASS INDEX (BMI) OF 40.0 TO 44.9 IN ADULT: ICD-10-CM

## 2022-05-31 PROCEDURE — 99214 OFFICE O/P EST MOD 30 MIN: CPT | Performed by: INTERNAL MEDICINE

## 2022-05-31 RX ORDER — OLMESARTAN MEDOXOMIL AND HYDROCHLOROTHIAZIDE 20/12.5 20; 12.5 MG/1; MG/1
0.5 TABLET ORAL DAILY
COMMUNITY
Start: 2022-03-03 | End: 2022-05-31

## 2022-05-31 RX ORDER — METOPROLOL SUCCINATE 50 MG/1
TABLET, EXTENDED RELEASE ORAL
COMMUNITY
Start: 2022-03-03 | End: 2022-05-31

## 2022-05-31 RX ORDER — POTASSIUM CHLORIDE 750 MG/1
20 CAPSULE, EXTENDED RELEASE ORAL 2 TIMES DAILY WITH MEALS
Qty: 60 CAPSULE | Refills: 5 | Status: SHIPPED | OUTPATIENT
Start: 2022-05-31 | End: 2022-05-31 | Stop reason: SDUPTHER

## 2022-05-31 RX ORDER — POTASSIUM CHLORIDE 750 MG/1
20 CAPSULE, EXTENDED RELEASE ORAL 2 TIMES DAILY WITH MEALS
Qty: 180 CAPSULE | Refills: 3 | Status: SHIPPED | OUTPATIENT
Start: 2022-05-31 | End: 2023-03-02 | Stop reason: SDUPTHER

## 2022-05-31 NOTE — PROGRESS NOTES
"Chief Complaint/ HPI: Patient is here to follow-up with lab work, congestive heart failure medications review of labs, says she is doing better she not blacking out or having feelings like she is going to get weak again since we have stopped some of her medications as of last visit March 2022, she is more active,          Objective   Vital Signs  Vitals:    05/31/22 1524   BP: 117/70   Pulse: 93   Temp: 97.3 °F (36.3 °C)   SpO2: 95%   Weight: 97 kg (213 lb 12.8 oz)   Height: 160 cm (62.99\")      Body mass index is 37.88 kg/m².  Review of Systems   Constitutional: Negative.    HENT: Negative.    Eyes: Negative.    Respiratory: Negative.    Cardiovascular: Negative.    Gastrointestinal: Negative.    Endocrine: Negative.    Genitourinary: Negative.    Musculoskeletal: Negative.    Allergic/Immunologic: Negative.    Neurological: Negative.    Hematological: Negative.    Psychiatric/Behavioral: Negative.       Physical Exam  Constitutional:       General: She is not in acute distress.     Appearance: Normal appearance. She is obese.   HENT:      Head: Normocephalic.      Mouth/Throat:      Mouth: Mucous membranes are moist.   Eyes:      Conjunctiva/sclera: Conjunctivae normal.      Pupils: Pupils are equal, round, and reactive to light.   Cardiovascular:      Rate and Rhythm: Normal rate. Rhythm irregular.      Pulses: Normal pulses.      Heart sounds: Murmur (1/6 systolic murmur) heard.   Pulmonary:      Effort: Pulmonary effort is normal.      Breath sounds: Normal breath sounds.   Abdominal:      General: Bowel sounds are normal.      Palpations: Abdomen is soft.   Musculoskeletal:         General: No swelling. Normal range of motion.      Cervical back: Neck supple.   Skin:     General: Skin is warm and dry.      Coloration: Skin is not jaundiced.   Neurological:      General: No focal deficit present.      Mental Status: She is alert and oriented to person, place, and time. Mental status is at baseline. "   Psychiatric:         Mood and Affect: Mood normal.         Behavior: Behavior normal.         Thought Content: Thought content normal.         Judgment: Judgment normal.        Result Review :   Lab Results   Component Value Date    PROBNP 1,841.0 (H) 05/25/2022    PROBNP 1,967.0 (H) 03/04/2022    PROBNP 1,036.0 12/21/2021     CMP    CMP 12/21/21 3/4/22 5/25/22   Glucose 97 119 (A) 115 (A)   BUN 19 18 12   Creatinine 1.09 (A) 1.50 (A) 1.13 (A)   eGFR Non African Am 49 (A)     Sodium 135 (A) 136 138   Potassium 4.0 4.5 4.2   Chloride 96 (A) 97 (A) 101   Calcium 9.7 10.3 9.9   Albumin 3.50 4.10 4.00   Total Bilirubin 1.1 0.7 0.7   Alkaline Phosphatase 49 57 70   AST (SGOT) 15 20 17   ALT (SGPT) 10 12 8   (A) Abnormal value            CBC w/diff    CBC w/Diff 12/21/21 12/21/21 3/4/22 5/25/22    0453 0453     WBC 5.57 5.96 9.13 8.58   RBC 3.82 3.84 4.13 4.09   Hemoglobin 12.6 12.2 13.3 13.2   Hematocrit 40.7 37.4 40.1 39.7   .5 (A) 97.4 (A) 97.1 (A) 97.1 (A)   MCH 33.0 31.8 32.2 32.3   MCHC 31.0 (A) 32.6 33.2 33.2   RDW 14.3 13.4 13.4 12.5   Platelets 175 180 238 254   Neutrophil Rel % 59.1 60.3 63.8 65.4   Immature Granulocyte Rel % 0.2 0.2 0.3 0.2   Lymphocyte Rel % 24.4 23.7 23.0 23.1   Monocyte Rel % 12.7 (A) 11.9 9.9 8.0   Eosinophil Rel % 2.9 3.2 2.5 2.7   Basophil Rel % 0.7 0.7 0.5 0.6   (A) Abnormal value             Lipid Panel    Lipid Panel 9/3/21 3/4/22   Total Cholesterol 150 174   Triglycerides 137 181 (A)   HDL Cholesterol 41 37 (A)   VLDL Cholesterol 24 32   LDL Cholesterol  85 105 (A)   LDL/HDL Ratio 1.99 2.72   (A) Abnormal value             Lab Results   Component Value Date    TSH 2.500 03/04/2022    TSH 3.940 12/21/2021    TSH 1.850 07/13/2021      Lab Results   Component Value Date    FREET4 1.68 03/04/2022    FREET4 1.47 07/13/2021      A1C Last 3 Results    HGBA1C Last 3 Results 12/21/21 3/4/22 5/25/22   Hemoglobin A1C 5.20 5.70 (A) 5.90 (A)   (A) Abnormal value                               Visit Diagnoses:    ICD-10-CM ICD-9-CM   1. Hypotension due to drugs  I95.2 458.8     E947.9   2. Primary osteoarthritis of both knees  M17.0 715.16   3. Anxiety, generalized  F41.1 300.02   4. Chronic fatigue syndrome  R53.82 780.71   5. Current mild episode of major depressive disorder, unspecified whether recurrent ([facility])  F32.0 296.21   6. Vitamin D deficiency  E55.9 268.9   7. GERD without esophagitis  K21.9 530.81   8. Atrial fibrillation, persistent  I48.19 427.31   9. IFG (impaired fasting glucose)  R73.01 790.21   10. Acute systolic congestive heart failure ([facility])  I50.21 428.21     428.0   11. Seasonal allergic rhinitis due to pollen  J30.1 477.0   12. Essential hypertension  I10 401.9   13. Hypothyroidism due to Hashimoto's thyroiditis  E03.8 244.8    E06.3 245.2   14. Mixed hyperlipidemia  E78.2 272.2   15. Class 3 severe obesity due to excess calories with serious comorbidity and body mass index (BMI) of 40.0 to 44.9 in adult ([facility])  E66.01 278.01    Z68.41 V85.41       Assessment and Plan   Diagnoses and all orders for this visit:    1. Hypotension due to drugs (Primary)  -     Comprehensive Metabolic Panel; Future  -     CBC & Differential; Future  -     Hemoglobin A1c; Future  -     Lipid Panel; Future  -     Magnesium; Future  -     proBNP; Future  -     TSH+Free T4; Future    2. Primary osteoarthritis of both knees  -     Comprehensive Metabolic Panel; Future  -     CBC & Differential; Future  -     Hemoglobin A1c; Future  -     Lipid Panel; Future  -     Magnesium; Future  -     proBNP; Future  -     TSH+Free T4; Future    3. Anxiety, generalized  -     Comprehensive Metabolic Panel; Future  -     CBC & Differential; Future  -     Hemoglobin A1c; Future  -     Lipid Panel; Future  -     Magnesium; Future  -     proBNP; Future  -     TSH+Free T4; Future    4. Chronic fatigue syndrome  -     Comprehensive Metabolic Panel; Future  -     CBC & Differential; Future  -     Hemoglobin A1c; Future  -      Lipid Panel; Future  -     Magnesium; Future  -     proBNP; Future  -     TSH+Free T4; Future    5. Current mild episode of major depressive disorder, unspecified whether recurrent (HCC)  -     Comprehensive Metabolic Panel; Future  -     CBC & Differential; Future  -     Hemoglobin A1c; Future  -     Lipid Panel; Future  -     Magnesium; Future  -     proBNP; Future  -     TSH+Free T4; Future    6. Vitamin D deficiency  -     Comprehensive Metabolic Panel; Future  -     CBC & Differential; Future  -     Hemoglobin A1c; Future  -     Lipid Panel; Future  -     Magnesium; Future  -     proBNP; Future  -     TSH+Free T4; Future    7. GERD without esophagitis  -     Comprehensive Metabolic Panel; Future  -     CBC & Differential; Future  -     Hemoglobin A1c; Future  -     Lipid Panel; Future  -     Magnesium; Future  -     proBNP; Future  -     TSH+Free T4; Future    8. Atrial fibrillation, persistent  -     Comprehensive Metabolic Panel; Future  -     CBC & Differential; Future  -     Hemoglobin A1c; Future  -     Lipid Panel; Future  -     Magnesium; Future  -     proBNP; Future  -     TSH+Free T4; Future    9. IFG (impaired fasting glucose)  -     Comprehensive Metabolic Panel; Future  -     CBC & Differential; Future  -     Hemoglobin A1c; Future  -     Lipid Panel; Future  -     Magnesium; Future  -     proBNP; Future  -     TSH+Free T4; Future    10. Acute systolic congestive heart failure (HCC)  -     Comprehensive Metabolic Panel; Future  -     CBC & Differential; Future  -     Hemoglobin A1c; Future  -     Lipid Panel; Future  -     Magnesium; Future  -     proBNP; Future  -     TSH+Free T4; Future    11. Seasonal allergic rhinitis due to pollen  -     Comprehensive Metabolic Panel; Future  -     CBC & Differential; Future  -     Hemoglobin A1c; Future  -     Lipid Panel; Future  -     Magnesium; Future  -     proBNP; Future  -     TSH+Free T4; Future    12. Essential hypertension  -     Comprehensive  Metabolic Panel; Future  -     CBC & Differential; Future  -     Hemoglobin A1c; Future  -     Lipid Panel; Future  -     Magnesium; Future  -     proBNP; Future  -     TSH+Free T4; Future    13. Hypothyroidism due to Hashimoto's thyroiditis  -     Comprehensive Metabolic Panel; Future  -     CBC & Differential; Future  -     Hemoglobin A1c; Future  -     Lipid Panel; Future  -     Magnesium; Future  -     proBNP; Future  -     TSH+Free T4; Future    14. Mixed hyperlipidemia  -     Comprehensive Metabolic Panel; Future  -     CBC & Differential; Future  -     Hemoglobin A1c; Future  -     Lipid Panel; Future  -     Magnesium; Future  -     proBNP; Future  -     TSH+Free T4; Future    15. Class 3 severe obesity due to excess calories with serious comorbidity and body mass index (BMI) of 40.0 to 44.9 in adult (HCC)  -     Comprehensive Metabolic Panel; Future  -     CBC & Differential; Future  -     Hemoglobin A1c; Future  -     Lipid Panel; Future  -     Magnesium; Future  -     proBNP; Future  -     TSH+Free T4; Future    Other orders  -     potassium chloride (MICRO-K) 10 MEQ CR capsule; Take 2 capsules by mouth 2 (Two) Times a Day With Meals.  Dispense: 180 capsule; Refill: 3         Patient with orthostasis presyncopal symptoms in March 2022 we stop spironolactone, and we stopped lisinopril due to low blood pressures and she is feeling much better, May 2022    Midthoracic back pain worsening, December 28, 2021     cKD, stage IIIa, creatinine improved to 1.1 May 2022    New onset acute CHF.  Patient had echo on 2/5/2021 which revealed EF 55 to 60%.  with mild MR as well, study was technically difficult. At that time there was not any significant LVH noted on that echo either. Most likely related to new onset A. Fib = tachycardia induced cardiomyopathy,   --> current Echo came back with EF 35 to 40% with moderate global hypokinesis.,  Technically difficult study, moderately dilated left atrium and right atrium  moderate mitral annular calcification with thickened leaflets and mild to moderate mitral stenosis minimal prolapse of the anterior mitral leaflet mild aortic sclerosis with trivial aortic regurgitation mild right ventricular pressure elevations at 44 mmHg dilated IVC consistent with elevated central venous pressures moderate size left pleural effusion trivial pericardial effusion------ as noted this is new in just the past 10 months, patient will need either stress imaging or catheterization.  defer to cardiology on this.  She is diuresing, so we will continue Lasix 40 mg twice daily, potassium pills  2 tabs twice a day ,  Eliquis 5 mg twice daily, digoxin 0.125 mg daily, carvedilol 6.25 mg twice a day --------------------------- BR NP level at 3200 on admission, , down to 1000 on the day of discharge with a potassium of 4.0 BUN 19 creatinine of 1.0,, current BR NP level at 1841 May 25, 2022--- patient has echocardiogram scheduled August 1, 2022 for follow-up and has cardiology follow-up in 6 months    atrial fibrillation.  -continue on carvedilol, 6.25 twice daily, digoxin 0.125 mg daily, Eliquis 5 mg twice daily     hypertension.  Continue lisinopril 5 mg daily, Aldactone 25 mg daily, carvedilol,, 6.25 mg twice daily, Lasix 40 mg twice a day diuretics,, potassium 2 tablets twice a day     Bilateral pleural effusions. CT chest December 10, 2021, shows bilateral pleural effusions, compressive atelectasis and pulmonary edema----, x-ray December 20 showed small pleural effusion so we will not attempt thoracentesis at this time, patient can follow-up with repeat chest x-rays in several weeks after aggressive diuretic regimen-----------------, repeat CT scan December 17, 2021 shows no change moderate pulmonary edema bilateral pleural effusions of moderate degree most likely cardiac in nature    Impaired fasting glucose, hemoglobin A1c 5.9 May 2022    Insomnia, continues on temazepam 15 mg nightly as needed    Left  total knee arthroplasty January 2014, [facility], right total knee arthroplasty 2013    Vitamin D deficiency continues on replacement therapy,    Cologuard testing negative, January 2020    Breast cancer left mastectomy January 2011--- benign mammogram May 2022 right sided    Abdominal pains recent colonoscopy July 2021 no obvious lesions, Dr. Perez            Follow Up   Return in about 4 months (around 9/30/2022).  Patient was given instructions and counseling regarding her condition or for health maintenance advice. Please see specific information pulled into the AVS if appropriate.

## 2022-06-01 RX ORDER — SPIRONOLACTONE 25 MG/1
25 TABLET ORAL DAILY
Qty: 30 TABLET | Refills: 5 | Status: SHIPPED | OUTPATIENT
Start: 2022-06-01 | End: 2022-09-29

## 2022-06-01 RX ORDER — CARVEDILOL 6.25 MG/1
TABLET ORAL
Qty: 60 TABLET | Refills: 5 | Status: SHIPPED | OUTPATIENT
Start: 2022-06-01 | End: 2022-11-28

## 2022-06-01 RX ORDER — LISINOPRIL 5 MG/1
5 TABLET ORAL
Qty: 30 TABLET | Refills: 5 | Status: SHIPPED | OUTPATIENT
Start: 2022-06-01 | End: 2022-09-29

## 2022-06-01 RX ORDER — FUROSEMIDE 40 MG/1
TABLET ORAL
Qty: 60 TABLET | Refills: 5 | Status: SHIPPED | OUTPATIENT
Start: 2022-06-01 | End: 2022-11-28

## 2022-06-02 RX ORDER — APIXABAN 5 MG/1
TABLET, FILM COATED ORAL
Qty: 180 TABLET | Refills: 1 | Status: SHIPPED | OUTPATIENT
Start: 2022-06-02 | End: 2022-12-01

## 2022-06-02 RX ORDER — PANTOPRAZOLE SODIUM 40 MG/1
40 TABLET, DELAYED RELEASE ORAL DAILY
Qty: 30 TABLET | Refills: 5 | Status: SHIPPED | OUTPATIENT
Start: 2022-06-02 | End: 2022-11-28

## 2022-07-20 DIAGNOSIS — E78.2 MIXED HYPERLIPIDEMIA: ICD-10-CM

## 2022-07-20 DIAGNOSIS — E66.01 CLASS 3 SEVERE OBESITY DUE TO EXCESS CALORIES WITH SERIOUS COMORBIDITY AND BODY MASS INDEX (BMI) OF 40.0 TO 44.9 IN ADULT: ICD-10-CM

## 2022-07-20 DIAGNOSIS — I48.91 ATRIAL FIBRILLATION, UNSPECIFIED TYPE: ICD-10-CM

## 2022-07-20 DIAGNOSIS — R73.01 IFG (IMPAIRED FASTING GLUCOSE): ICD-10-CM

## 2022-07-20 DIAGNOSIS — F41.1 ANXIETY, GENERALIZED: ICD-10-CM

## 2022-07-20 DIAGNOSIS — I50.21 ACUTE SYSTOLIC CONGESTIVE HEART FAILURE: ICD-10-CM

## 2022-07-20 DIAGNOSIS — F32.0 CURRENT MILD EPISODE OF MAJOR DEPRESSIVE DISORDER, UNSPECIFIED WHETHER RECURRENT: ICD-10-CM

## 2022-07-20 DIAGNOSIS — E55.9 VITAMIN D DEFICIENCY: ICD-10-CM

## 2022-07-20 DIAGNOSIS — R06.02 SHORTNESS OF BREATH ON EXERTION: ICD-10-CM

## 2022-07-20 DIAGNOSIS — M17.0 PRIMARY OSTEOARTHRITIS OF BOTH KNEES: ICD-10-CM

## 2022-07-20 DIAGNOSIS — I10 HYPERTENSION, ESSENTIAL: ICD-10-CM

## 2022-07-20 RX ORDER — TRAMADOL HYDROCHLORIDE 50 MG/1
TABLET ORAL
Qty: 60 TABLET | Refills: 3 | Status: SHIPPED | OUTPATIENT
Start: 2022-07-20 | End: 2022-12-12

## 2022-08-02 ENCOUNTER — TELEPHONE (OUTPATIENT)
Dept: CARDIOLOGY | Facility: CLINIC | Age: 77
End: 2022-08-02

## 2022-08-02 NOTE — TELEPHONE ENCOUNTER
----- Message from RAJIV Austin sent at 8/2/2022  8:43 AM EDT -----  Notify pt echo result: Normal left ventricular systolic function. EF 51% (improved from previous echo)  Moderate mitral regurgitation & stenosis, slightly worse than previous echo. She should monitor for worsening shortness of breath. Repeat echo in 1 year.  Mild tricuspid regurgitation.  Follow up as scheduled

## 2022-08-03 ENCOUNTER — TELEPHONE (OUTPATIENT)
Dept: INTERNAL MEDICINE | Facility: CLINIC | Age: 77
End: 2022-08-03

## 2022-08-03 NOTE — TELEPHONE ENCOUNTER
Caller: Rikki Bella Metzger    Relationship: Self    Best call back number:[phone]    Requested Prescriptions:   PATIENT SAID THE NAME OF MEDICATION WAS DIGOXIN TAKEN ONCE DAILY BUT DID NOT KNOW THE DOSAGE. SAID IT SLOWED DOWN HER HEART RATE     Pharmacy where request should be sent: [id number] - [place], [place] - [address] - [phone] [facility] [phone] FX     Additional details provided by patient: PATIENT DID NOT KNOW THE DOSAGE AND NOT IN HER LIST    Does the patient have less than a 3 day supply:  [] Yes  [x] No    Alicia Castillo Rep   08/03/22 09:10 EDT

## 2022-08-29 ENCOUNTER — TELEPHONE (OUTPATIENT)
Dept: GASTROENTEROLOGY | Facility: CLINIC | Age: 77
End: 2022-08-29

## 2022-08-29 NOTE — TELEPHONE ENCOUNTER
Left pt a voicemail to contact the office concerning scope.  She will need another appointment with a provider to proceed with scope.

## 2022-08-30 ENCOUNTER — TELEPHONE (OUTPATIENT)
Dept: GASTROENTEROLOGY | Facility: CLINIC | Age: 77
End: 2022-08-30

## 2022-08-30 NOTE — TELEPHONE ENCOUNTER
Caller: Bella Brandt    Relationship: Self    Best call back number: [phone]    What is the best time to reach you: ANYTIME    Who are you requesting to speak with (clinical staff, provider,  specific staff member): CLINICAL STAFF    Do you know the name of the person who called: ADRIA STRICKLAND    Do you require a callback: YES

## 2022-09-22 ENCOUNTER — LAB (OUTPATIENT)
Dept: LAB | Facility: HOSPITAL | Age: 77
End: 2022-09-22

## 2022-09-22 DIAGNOSIS — E66.01 CLASS 3 SEVERE OBESITY DUE TO EXCESS CALORIES WITH SERIOUS COMORBIDITY AND BODY MASS INDEX (BMI) OF 40.0 TO 44.9 IN ADULT: ICD-10-CM

## 2022-09-22 DIAGNOSIS — G93.32 CHRONIC FATIGUE SYNDROME: ICD-10-CM

## 2022-09-22 DIAGNOSIS — M17.0 PRIMARY OSTEOARTHRITIS OF BOTH KNEES: ICD-10-CM

## 2022-09-22 DIAGNOSIS — R73.01 IFG (IMPAIRED FASTING GLUCOSE): ICD-10-CM

## 2022-09-22 DIAGNOSIS — E03.8 HYPOTHYROIDISM DUE TO HASHIMOTO'S THYROIDITIS: ICD-10-CM

## 2022-09-22 DIAGNOSIS — E78.2 MIXED HYPERLIPIDEMIA: ICD-10-CM

## 2022-09-22 DIAGNOSIS — F32.0 CURRENT MILD EPISODE OF MAJOR DEPRESSIVE DISORDER, UNSPECIFIED WHETHER RECURRENT: ICD-10-CM

## 2022-09-22 DIAGNOSIS — F41.1 ANXIETY, GENERALIZED: ICD-10-CM

## 2022-09-22 DIAGNOSIS — E55.9 VITAMIN D DEFICIENCY: ICD-10-CM

## 2022-09-22 DIAGNOSIS — I48.19 ATRIAL FIBRILLATION, PERSISTENT: ICD-10-CM

## 2022-09-22 DIAGNOSIS — I95.2 HYPOTENSION DUE TO DRUGS: ICD-10-CM

## 2022-09-22 DIAGNOSIS — J30.1 SEASONAL ALLERGIC RHINITIS DUE TO POLLEN: ICD-10-CM

## 2022-09-22 DIAGNOSIS — E06.3 HYPOTHYROIDISM DUE TO HASHIMOTO'S THYROIDITIS: ICD-10-CM

## 2022-09-22 DIAGNOSIS — K21.9 GERD WITHOUT ESOPHAGITIS: ICD-10-CM

## 2022-09-22 DIAGNOSIS — I10 ESSENTIAL HYPERTENSION: ICD-10-CM

## 2022-09-22 DIAGNOSIS — I50.21 ACUTE SYSTOLIC CONGESTIVE HEART FAILURE: ICD-10-CM

## 2022-09-22 LAB
ALBUMIN SERPL-MCNC: 4.4 G/DL (ref 3.5–5.2)
ALBUMIN/GLOB SERPL: 1.9 G/DL
ALP SERPL-CCNC: 70 U/L (ref 39–117)
ALT SERPL W P-5'-P-CCNC: 10 U/L (ref 1–33)
ANION GAP SERPL CALCULATED.3IONS-SCNC: 8 MMOL/L (ref 5–15)
AST SERPL-CCNC: 17 U/L (ref 1–32)
BASOPHILS # BLD AUTO: 0.06 10*3/MM3 (ref 0–0.2)
BASOPHILS NFR BLD AUTO: 1 % (ref 0–1.5)
BILIRUB SERPL-MCNC: 0.8 MG/DL (ref 0–1.2)
BUN SERPL-MCNC: 16 MG/DL (ref 8–23)
BUN/CREAT SERPL: 14.3 (ref 7–25)
CALCIUM SPEC-SCNC: 9.7 MG/DL (ref 8.6–10.5)
CHLORIDE SERPL-SCNC: 102 MMOL/L (ref 98–107)
CHOLEST SERPL-MCNC: 123 MG/DL (ref 0–200)
CO2 SERPL-SCNC: 31 MMOL/L (ref 22–29)
CREAT SERPL-MCNC: 1.12 MG/DL (ref 0.57–1)
DEPRECATED RDW RBC AUTO: 45 FL (ref 37–54)
EGFRCR SERPLBLD CKD-EPI 2021: 50.1 ML/MIN/1.73
EOSINOPHIL # BLD AUTO: 0.15 10*3/MM3 (ref 0–0.4)
EOSINOPHIL NFR BLD AUTO: 2.5 % (ref 0.3–6.2)
ERYTHROCYTE [DISTWIDTH] IN BLOOD BY AUTOMATED COUNT: 13 % (ref 12.3–15.4)
GLOBULIN UR ELPH-MCNC: 2.3 GM/DL
GLUCOSE SERPL-MCNC: 100 MG/DL (ref 65–99)
HBA1C MFR BLD: 5.8 % (ref 4.8–5.6)
HCT VFR BLD AUTO: 37.2 % (ref 34–46.6)
HDLC SERPL-MCNC: 39 MG/DL (ref 40–60)
HGB BLD-MCNC: 12.2 G/DL (ref 12–15.9)
IMM GRANULOCYTES # BLD AUTO: 0.01 10*3/MM3 (ref 0–0.05)
IMM GRANULOCYTES NFR BLD AUTO: 0.2 % (ref 0–0.5)
LDLC SERPL CALC-MCNC: 65 MG/DL (ref 0–100)
LDLC/HDLC SERPL: 1.62 {RATIO}
LYMPHOCYTES # BLD AUTO: 1.66 10*3/MM3 (ref 0.7–3.1)
LYMPHOCYTES NFR BLD AUTO: 27.7 % (ref 19.6–45.3)
MAGNESIUM SERPL-MCNC: 2.4 MG/DL (ref 1.6–2.4)
MCH RBC QN AUTO: 31.2 PG (ref 26.6–33)
MCHC RBC AUTO-ENTMCNC: 32.8 G/DL (ref 31.5–35.7)
MCV RBC AUTO: 95.1 FL (ref 79–97)
MONOCYTES # BLD AUTO: 0.53 10*3/MM3 (ref 0.1–0.9)
MONOCYTES NFR BLD AUTO: 8.8 % (ref 5–12)
NEUTROPHILS NFR BLD AUTO: 3.59 10*3/MM3 (ref 1.7–7)
NEUTROPHILS NFR BLD AUTO: 59.8 % (ref 42.7–76)
NRBC BLD AUTO-RTO: 0 /100 WBC (ref 0–0.2)
NT-PROBNP SERPL-MCNC: 1570 PG/ML (ref 0–1800)
PLATELET # BLD AUTO: 201 10*3/MM3 (ref 140–450)
PMV BLD AUTO: 11.2 FL (ref 6–12)
POTASSIUM SERPL-SCNC: 4.2 MMOL/L (ref 3.5–5.2)
PROT SERPL-MCNC: 6.7 G/DL (ref 6–8.5)
RBC # BLD AUTO: 3.91 10*6/MM3 (ref 3.77–5.28)
SODIUM SERPL-SCNC: 141 MMOL/L (ref 136–145)
T4 FREE SERPL-MCNC: 1.51 NG/DL (ref 0.93–1.7)
TRIGL SERPL-MCNC: 104 MG/DL (ref 0–150)
TSH SERPL DL<=0.05 MIU/L-ACNC: 2.98 UIU/ML (ref 0.27–4.2)
VLDLC SERPL-MCNC: 19 MG/DL (ref 5–40)
WBC NRBC COR # BLD: 6 10*3/MM3 (ref 3.4–10.8)

## 2022-09-22 PROCEDURE — 80061 LIPID PANEL: CPT

## 2022-09-22 PROCEDURE — 83735 ASSAY OF MAGNESIUM: CPT

## 2022-09-22 PROCEDURE — 84439 ASSAY OF FREE THYROXINE: CPT

## 2022-09-22 PROCEDURE — 83880 ASSAY OF NATRIURETIC PEPTIDE: CPT

## 2022-09-22 PROCEDURE — 85025 COMPLETE CBC W/AUTO DIFF WBC: CPT

## 2022-09-22 PROCEDURE — 83036 HEMOGLOBIN GLYCOSYLATED A1C: CPT

## 2022-09-22 PROCEDURE — 84443 ASSAY THYROID STIM HORMONE: CPT

## 2022-09-22 PROCEDURE — 36415 COLL VENOUS BLD VENIPUNCTURE: CPT

## 2022-09-22 PROCEDURE — 80053 COMPREHEN METABOLIC PANEL: CPT

## 2022-09-29 ENCOUNTER — OFFICE VISIT (OUTPATIENT)
Dept: INTERNAL MEDICINE | Facility: CLINIC | Age: 77
End: 2022-09-29

## 2022-09-29 VITALS
OXYGEN SATURATION: 96 % | TEMPERATURE: 97 F | SYSTOLIC BLOOD PRESSURE: 102 MMHG | HEIGHT: 64 IN | WEIGHT: 203 LBS | HEART RATE: 115 BPM | DIASTOLIC BLOOD PRESSURE: 70 MMHG | BODY MASS INDEX: 34.66 KG/M2

## 2022-09-29 DIAGNOSIS — M17.0 PRIMARY OSTEOARTHRITIS OF BOTH KNEES: ICD-10-CM

## 2022-09-29 DIAGNOSIS — E55.9 VITAMIN D DEFICIENCY: Primary | ICD-10-CM

## 2022-09-29 DIAGNOSIS — E03.8 HYPOTHYROIDISM DUE TO HASHIMOTO'S THYROIDITIS: ICD-10-CM

## 2022-09-29 DIAGNOSIS — Z12.31 SCREENING MAMMOGRAM FOR BREAST CANCER: ICD-10-CM

## 2022-09-29 DIAGNOSIS — E66.01 CLASS 3 SEVERE OBESITY DUE TO EXCESS CALORIES WITH SERIOUS COMORBIDITY AND BODY MASS INDEX (BMI) OF 40.0 TO 44.9 IN ADULT: ICD-10-CM

## 2022-09-29 DIAGNOSIS — I48.91 ATRIAL FIBRILLATION, UNSPECIFIED TYPE: ICD-10-CM

## 2022-09-29 DIAGNOSIS — E06.3 HYPOTHYROIDISM DUE TO HASHIMOTO'S THYROIDITIS: ICD-10-CM

## 2022-09-29 DIAGNOSIS — J30.1 SEASONAL ALLERGIC RHINITIS DUE TO POLLEN: ICD-10-CM

## 2022-09-29 DIAGNOSIS — F41.1 ANXIETY, GENERALIZED: ICD-10-CM

## 2022-09-29 DIAGNOSIS — E78.2 MIXED HYPERLIPIDEMIA: ICD-10-CM

## 2022-09-29 DIAGNOSIS — I50.21 ACUTE SYSTOLIC CONGESTIVE HEART FAILURE: ICD-10-CM

## 2022-09-29 DIAGNOSIS — R73.01 IFG (IMPAIRED FASTING GLUCOSE): ICD-10-CM

## 2022-09-29 DIAGNOSIS — I10 ESSENTIAL HYPERTENSION: ICD-10-CM

## 2022-09-29 DIAGNOSIS — I10 HYPERTENSION, ESSENTIAL: ICD-10-CM

## 2022-09-29 DIAGNOSIS — K21.9 GERD WITHOUT ESOPHAGITIS: ICD-10-CM

## 2022-09-29 PROCEDURE — 99214 OFFICE O/P EST MOD 30 MIN: CPT | Performed by: INTERNAL MEDICINE

## 2022-09-29 NOTE — PROGRESS NOTES
"Chief Complaint/ HPI: Here to follow-up with her regular checkup, her sons with her she has had increased stress and is talking about maybe starting a medication for that, she is here to review labs still having some right flank and back pain at times and has to take a pain medication that we gave her last time, she is not short of breath or having significant chest pains at this time,    Patient is here to follow-up with lab work, congestive heart failure medications review of labs, says she is doing better she not blacking out or having feelings like she is going to get weak again since we have stopped some of her medications as of last visit March 2022, she is more active,    Done an excellent job with weight loss    Objective   Vital Signs  Vitals:    09/29/22 1511   BP: 102/70   Pulse: 115   Temp: 97 °F (36.1 °C)   SpO2: 96%   Weight: 92.1 kg (203 lb)   Height: 162.6 cm (64.02\")      Body mass index is 34.83 kg/m².  Review of Systems   Constitutional: Negative.    HENT: Negative.    Eyes: Negative.    Respiratory: Negative.    Cardiovascular: Negative.    Gastrointestinal: Negative.    Endocrine: Negative.    Genitourinary: Negative.    Musculoskeletal: Positive for back pain and myalgias.   Allergic/Immunologic: Negative.    Neurological: Negative.    Hematological: Negative.    Psychiatric/Behavioral: Negative.       Physical Exam  Constitutional:       General: She is not in acute distress.     Appearance: Normal appearance. She is obese.   HENT:      Head: Normocephalic.      Mouth/Throat:      Mouth: Mucous membranes are moist.   Eyes:      Conjunctiva/sclera: Conjunctivae normal.      Pupils: Pupils are equal, round, and reactive to light.   Cardiovascular:      Rate and Rhythm: Normal rate and regular rhythm.      Pulses: Normal pulses.      Heart sounds: Murmur heard.   Pulmonary:      Effort: Pulmonary effort is normal.      Breath sounds: Normal breath sounds.   Abdominal:      General: Bowel sounds " are normal.      Palpations: Abdomen is soft.   Musculoskeletal:         General: No swelling. Normal range of motion.      Cervical back: Neck supple.   Skin:     General: Skin is warm and dry.      Coloration: Skin is not jaundiced.   Neurological:      General: No focal deficit present.      Mental Status: She is alert and oriented to person, place, and time. Mental status is at baseline.   Psychiatric:         Mood and Affect: Mood normal.         Behavior: Behavior normal.         Thought Content: Thought content normal.         Judgment: Judgment normal.        Result Review :   Lab Results   Component Value Date    PROBNP 1,570.0 09/22/2022    PROBNP 1,841.0 (H) 05/25/2022    PROBNP 1,967.0 (H) 03/04/2022     CMP    CMP 3/4/22 5/25/22 9/22/22   Glucose 119 (A) 115 (A) 100 (A)   BUN 18 12 16   Creatinine 1.50 (A) 1.13 (A) 1.12 (A)   Sodium 136 138 141   Potassium 4.5 4.2 4.2   Chloride 97 (A) 101 102   Calcium 10.3 9.9 9.7   Albumin 4.10 4.00 4.40   Total Bilirubin 0.7 0.7 0.8   Alkaline Phosphatase 57 70 70   AST (SGOT) 20 17 17   ALT (SGPT) 12 8 10   (A) Abnormal value            CBC w/diff    CBC w/Diff 3/4/22 5/25/22 9/22/22   WBC 9.13 8.58 6.00   RBC 4.13 4.09 3.91   Hemoglobin 13.3 13.2 12.2   Hematocrit 40.1 39.7 37.2   MCV 97.1 (A) 97.1 (A) 95.1   MCH 32.2 32.3 31.2   MCHC 33.2 33.2 32.8   RDW 13.4 12.5 13.0   Platelets 238 254 201   Neutrophil Rel % 63.8 65.4 59.8   Immature Granulocyte Rel % 0.3 0.2 0.2   Lymphocyte Rel % 23.0 23.1 27.7   Monocyte Rel % 9.9 8.0 8.8   Eosinophil Rel % 2.5 2.7 2.5   Basophil Rel % 0.5 0.6 1.0   (A) Abnormal value             Lipid Panel    Lipid Panel 3/4/22 9/22/22   Total Cholesterol 174 123   Triglycerides 181 (A) 104   HDL Cholesterol 37 (A) 39 (A)   VLDL Cholesterol 32 19   LDL Cholesterol  105 (A) 65   LDL/HDL Ratio 2.72 1.62   (A) Abnormal value             Lab Results   Component Value Date    TSH 2.980 09/22/2022    TSH 2.500 03/04/2022    TSH 3.940 12/21/2021       Lab Results   Component Value Date    FREET4 1.51 09/22/2022    FREET4 1.68 03/04/2022    FREET4 1.47 07/13/2021      A1C Last 3 Results    HGBA1C Last 3 Results 3/4/22 5/25/22 9/22/22   Hemoglobin A1C 5.70 (A) 5.90 (A) 5.80 (A)   (A) Abnormal value                              Visit Diagnoses:    ICD-10-CM ICD-9-CM   1. Vitamin D deficiency  E55.9 268.9   2. IFG (impaired fasting glucose)  R73.01 790.21   3. Acute systolic congestive heart failure ([facility])  I50.21 428.21     428.0   4. Primary osteoarthritis of both knees  M17.0 715.16   5. Class 3 severe obesity due to excess calories with serious comorbidity and body mass index (BMI) of 40.0 to 44.9 in adult ([facility])  E66.01 278.01    Z68.41 V85.41   6. Mixed hyperlipidemia  E78.2 272.2   7. Hypertension, essential  I10 401.9   8. Anxiety, generalized  F41.1 300.02   9. Atrial fibrillation, unspecified type ([facility])  I48.91 427.31   10. Essential hypertension  I10 401.9   11. GERD without esophagitis  K21.9 530.81   12. Seasonal allergic rhinitis due to pollen  J30.1 477.0   13. Hypothyroidism due to Hashimoto's thyroiditis  E03.8 244.8    E06.3 245.2   14. Screening mammogram for breast cancer  Z12.31 V76.12       Assessment and Plan   Diagnoses and all orders for this visit:    1. Vitamin D deficiency (Primary)  -     Mammo Screening Digital Tomosynthesis Bilateral With CAD; Future  -     Vitamin D 25 Hydroxy; Future  -     Lipid Panel; Future  -     Hemoglobin A1c; Future  -     Comprehensive Metabolic Panel; Future  -     CBC & Differential; Future  -     TSH+Free T4; Future  -     proBNP; Future  -     Ambulatory Referral to Dermatology    2. IFG (impaired fasting glucose)  -     Mammo Screening Digital Tomosynthesis Bilateral With CAD; Future  -     Vitamin D 25 Hydroxy; Future  -     Lipid Panel; Future  -     Hemoglobin A1c; Future  -     Comprehensive Metabolic Panel; Future  -     CBC & Differential; Future  -     TSH+Free T4; Future  -     proBNP;  Future  -     Ambulatory Referral to Dermatology    3. Acute systolic congestive heart failure (HCC)  -     Mammo Screening Digital Tomosynthesis Bilateral With CAD; Future  -     Vitamin D 25 Hydroxy; Future  -     Lipid Panel; Future  -     Hemoglobin A1c; Future  -     Comprehensive Metabolic Panel; Future  -     CBC & Differential; Future  -     TSH+Free T4; Future  -     proBNP; Future  -     Ambulatory Referral to Dermatology    4. Primary osteoarthritis of both knees  -     Mammo Screening Digital Tomosynthesis Bilateral With CAD; Future  -     Vitamin D 25 Hydroxy; Future  -     Lipid Panel; Future  -     Hemoglobin A1c; Future  -     Comprehensive Metabolic Panel; Future  -     CBC & Differential; Future  -     TSH+Free T4; Future  -     proBNP; Future  -     Ambulatory Referral to Dermatology    5. Class 3 severe obesity due to excess calories with serious comorbidity and body mass index (BMI) of 40.0 to 44.9 in adult (HCC)  -     Mammo Screening Digital Tomosynthesis Bilateral With CAD; Future  -     Vitamin D 25 Hydroxy; Future  -     Lipid Panel; Future  -     Hemoglobin A1c; Future  -     Comprehensive Metabolic Panel; Future  -     CBC & Differential; Future  -     TSH+Free T4; Future  -     proBNP; Future  -     Ambulatory Referral to Dermatology    6. Mixed hyperlipidemia  -     Mammo Screening Digital Tomosynthesis Bilateral With CAD; Future  -     Vitamin D 25 Hydroxy; Future  -     Lipid Panel; Future  -     Hemoglobin A1c; Future  -     Comprehensive Metabolic Panel; Future  -     CBC & Differential; Future  -     TSH+Free T4; Future  -     proBNP; Future  -     Ambulatory Referral to Dermatology    7. Hypertension, essential  -     Mammo Screening Digital Tomosynthesis Bilateral With CAD; Future  -     Vitamin D 25 Hydroxy; Future  -     Lipid Panel; Future  -     Hemoglobin A1c; Future  -     Comprehensive Metabolic Panel; Future  -     CBC & Differential; Future  -     TSH+Free T4; Future  -      proBNP; Future  -     Ambulatory Referral to Dermatology    8. Anxiety, generalized  -     Mammo Screening Digital Tomosynthesis Bilateral With CAD; Future  -     Vitamin D 25 Hydroxy; Future  -     Lipid Panel; Future  -     Hemoglobin A1c; Future  -     Comprehensive Metabolic Panel; Future  -     CBC & Differential; Future  -     TSH+Free T4; Future  -     proBNP; Future  -     Ambulatory Referral to Dermatology    9. Atrial fibrillation, unspecified type (HCC)  -     Mammo Screening Digital Tomosynthesis Bilateral With CAD; Future  -     Vitamin D 25 Hydroxy; Future  -     Lipid Panel; Future  -     Hemoglobin A1c; Future  -     Comprehensive Metabolic Panel; Future  -     CBC & Differential; Future  -     TSH+Free T4; Future  -     proBNP; Future  -     Ambulatory Referral to Dermatology    10. Essential hypertension  -     Mammo Screening Digital Tomosynthesis Bilateral With CAD; Future  -     Vitamin D 25 Hydroxy; Future  -     Lipid Panel; Future  -     Hemoglobin A1c; Future  -     Comprehensive Metabolic Panel; Future  -     CBC & Differential; Future  -     TSH+Free T4; Future  -     proBNP; Future  -     Ambulatory Referral to Dermatology    11. GERD without esophagitis  -     Mammo Screening Digital Tomosynthesis Bilateral With CAD; Future  -     Vitamin D 25 Hydroxy; Future  -     Lipid Panel; Future  -     Hemoglobin A1c; Future  -     Comprehensive Metabolic Panel; Future  -     CBC & Differential; Future  -     TSH+Free T4; Future  -     proBNP; Future  -     Ambulatory Referral to Dermatology    12. Seasonal allergic rhinitis due to pollen  -     Mammo Screening Digital Tomosynthesis Bilateral With CAD; Future  -     Vitamin D 25 Hydroxy; Future  -     Lipid Panel; Future  -     Hemoglobin A1c; Future  -     Comprehensive Metabolic Panel; Future  -     CBC & Differential; Future  -     TSH+Free T4; Future  -     proBNP; Future  -     Ambulatory Referral to Dermatology    13. Hypothyroidism due  to Hashimoto's thyroiditis  -     Mammo Screening Digital Tomosynthesis Bilateral With CAD; Future  -     Vitamin D 25 Hydroxy; Future  -     Lipid Panel; Future  -     Hemoglobin A1c; Future  -     Comprehensive Metabolic Panel; Future  -     CBC & Differential; Future  -     TSH+Free T4; Future  -     proBNP; Future  -     Ambulatory Referral to Dermatology    14. Screening mammogram for breast cancer  -     Mammo Screening Digital Tomosynthesis Bilateral With CAD; Future  -     Vitamin D 25 Hydroxy; Future  -     Lipid Panel; Future  -     Hemoglobin A1c; Future  -     Comprehensive Metabolic Panel; Future  -     CBC & Differential; Future  -     TSH+Free T4; Future  -     proBNP; Future  -     Ambulatory Referral to Dermatology    Skin lesion, face, will refer to dermatology    Patient with orthostasis presyncopal symptoms in March 2022 we stop spironolactone, and we stopped lisinopril due to low blood pressures and she is feeling much better, May 2022, also stop digoxin,    Midthoracic back pain worsening, December 28, 2021, continues tramadol as needed continues,    cKD, stage IIIa, creatinine improved to 1.1 May 2022, 1.1 September 2022    New onset acute CHF.  Patient had echo on 2/5/2021 which revealed EF 55 to 60%.  with mild MR as well, study was technically difficult. At that time there was not any significant LVH noted on that echo either. Most likely related to new onset A. Fib = tachycardia induced cardiomyopathy,   --> current Echo came back with EF 35 to 40% with moderate global hypokinesis.,  Technically difficult study, moderately dilated left atrium and right atrium moderate mitral annular calcification with thickened leaflets and mild to moderate mitral stenosis minimal prolapse of the anterior mitral leaflet mild aortic sclerosis with trivial aortic regurgitation mild right ventricular pressure elevations at 44 mmHg dilated IVC consistent with elevated central venous pressures moderate size  left pleural effusion trivial pericardial effusion------  defer to cardiology on this.  She is diuresing, so we will continue Lasix 40 mg twice daily, potassium pills  2 tabs twice a day ,  Eliquis 5 mg twice daily,  carvedilol 6.25 mg twice a day --------------------------- BR NP level at 3200 on admission, , down to 1000 on the day of discharge with a potassium of 4.0 BUN 19 creatinine of 1.0,, current BR NP level at 1841 May 25, 2022--- echocardiogram August 1, 2022 shows fibrocalcific mitral and aortic valves normal LV function moderate mitral and moderate mitral stenosis with regurgitation mild tricuspid regurgitation    atrial fibrillation.  -continue on carvedilol, 6.25 twice daily,  Eliquis 5 mg twice daily, we stopped digoxin, September 2022, patient's heart rate was up a little bit today September 29, 2022 she really been taking her carvedilol once a day so we encouraged her to start taking it twice a day,     hypertension.  CONT  carvedilol,, 6.25 mg twice daily, Lasix 40 mg twice a day diuretics,, potassium 2 tablets twice a day---     Bilateral pleural effusions. CT chest December 10, 2021, shows bilateral pleural effusions, compressive atelectasis and pulmonary edema----, x-ray December 20 showed small pleural effusion ------ aggressive diuretic regimen-----------------, repeat CT scan December 17, 2021 shows no change moderate pulmonary edema bilateral pleural effusions of moderate degree most likely cardiac in nature    Impaired fasting glucose, hemoglobin A1c 5.8---, September 2022    Insomnia, continues on temazepam 15 mg nightly as needed    Left total knee arthroplasty January 2014, [facility], right total knee arthroplasty 2013    Vitamin D deficiency continues on replacement therapy,    Cologuard testing negative, January 2020    Breast cancer left mastectomy January 2011--- benign mammogram May 2022 right sided    Abdominal pains--- colonoscopy July 2021 no obvious lesions, Dr. Perez      Follow Up    Return in about 4 months (around 1/29/2023).  Patient was given instructions and counseling regarding her condition or for health maintenance advice. Please see specific information pulled into the AVS if appropriate.

## 2022-10-03 NOTE — PROGRESS NOTES
[facility]  Cardiology progress Note    Patient Name: Bella Brandt  : 1945    CHIEF COMPLAINT  Atrial fibrillation        Subjective   Subjective     HISTORY OF PRESENT ILLNESS    Bella Brandt is a 77 y.o. female with history of paroxysmal atrial fibrillation.  No chest pain or shortness of breath.    REVIEW OF SYSTEMS    Constitutional:    No fever, no weight loss  Skin:     No rash  Otolaryngeal:    No difficulty swallowing  Cardiovascular:  No chest pain or shortness of breath  Pulmonary:    No cough, no sputum production    Personal History     Social History:    reports that she quit smoking about 55 years ago. Her smoking use included cigarettes. She has never used smokeless tobacco. She reports previous alcohol use. She reports that she does not use drugs.    Home Medications:  Current Outpatient Medications on File Prior to Visit   Medication Sig   • carvedilol (COREG) 6.25 MG tablet TAKE 1 TABLET BY MOUTH TWICE DAILY WITH MEALS   • Eliquis 5 MG tablet tablet TAKE 1 TABLET BY MOUTH EVERY 12 HOURS   • furosemide (LASIX) 40 MG tablet TAKE 1 TABLET BY MOUTH TWICE DAILY   • Magnesium 500 MG capsule Take 500 mg by mouth Every Other Day.   • pantoprazole (PROTONIX) 40 MG EC tablet Take 1 tablet by mouth Daily.   • polyethylene glycol (MIRALAX) 17 g packet Take 17 g by mouth Daily As Needed (Use if senna-docusate is ineffective).   • potassium chloride (MICRO-K) 10 MEQ CR capsule Take 2 capsules by mouth 2 (Two) Times a Day With Meals.   • traMADol (ULTRAM) 50 MG tablet TAKE 1 TABLET BY MOUTH EVERY 6 HOURS AS NEEDED FOR MODERATE PAIN     No current facility-administered medications on file prior to visit.       Past Medical History:   Diagnosis Date   • Allergies    • Atrial fibrillation, persistent 12/10/2021   • Chronic fatigue, unspecified    • Depression    • Diverticulitis of large intestine without perforation or abscess without bleeding 2021   • Essential (primary) hypertension  .   • GERD without esophagitis    • Hypothyroidism    • Syncope and collapse    • Type 2 diabetes mellitus    • Vitamin D deficiency, unspecified        Allergies:  No Known Allergies    Objective    Objective       Vitals:   Heart Rate:  [92] 92  BP: (106)/(72) 106/72  Body mass index is 34.5 kg/m².     PHYSICAL EXAM:    General Appearance:   · well developed  · well nourished  HENT:   · oropharynx moist  · lips not cyanotic  Neck:  · thyroid not enlarged  · supple  Respiratory:  · no respiratory distress  · normal breath sounds  · no rales  Cardiovascular:  · no jugular venous distention  · regular rhythm  · apical impulse normal  · S1 normal, S2 normal  · no S3, no S4   · no murmur  · no rub, no thrill  · carotid pulses normal; no bruit  · pedal pulses normal  · lower extremity edema: none    Skin:   · warm, dry  Psychiatric:  · judgement and insight appropriate  · normal mood and affect        Result Review:  I have personally reviewed the available results from  [x]  Laboratory  [x]  EKG  [x]  Cardiology  [x]  Medications  [x]  Old records  []  Other:     Procedures  Lab Results   Component Value Date    CHOL 123 09/22/2022    CHOL 174 03/04/2022    CHOL 150 09/03/2021     Lab Results   Component Value Date    TRIG 104 09/22/2022    TRIG 181 (H) 03/04/2022    TRIG 137 09/03/2021     Lab Results   Component Value Date    HDL 39 (L) 09/22/2022    HDL 37 (L) 03/04/2022    HDL 41 09/03/2021     Lab Results   Component Value Date    LDL 65 09/22/2022     (H) 03/04/2022    LDL 85 09/03/2021     Lab Results   Component Value Date    VLDL 19 09/22/2022    VLDL 32 03/04/2022    VLDL 24 09/03/2021     Results for orders placed in visit on 08/01/22    Adult Transthoracic Echo Complete W/ Cont if Necessary Per Protocol    Interpretation Summary  Fibrocalcific mitral and aortic valves.  Normal left ventricular systolic function.  Moderate mitral regurgitation.  Moderate mitral stenosis.  Mild tricuspid  regurgitation.     Impression/Plan:  1.  Essential hypertension controlled: Continue carvedilol 6.25 mg twice a day.  Blood pressure controlled at home.  2.  Chronic diastolic heart failure stable: Continue Lasix 40 mg a day.  3.  Moderate mitral stenosis/mild regurgitation: Asymptomatic.  4.  Paroxysmal atrial fibrillation controlled heart rate: Continue carvedilol 6.25 mg twice a day.  Continue Eliquis 5 mg twice a day for stroke prevention.             Byron Lopez MD   10/04/22   12:58 EDT

## 2022-10-04 ENCOUNTER — OFFICE VISIT (OUTPATIENT)
Dept: CARDIOLOGY | Facility: CLINIC | Age: 77
End: 2022-10-04

## 2022-10-04 VITALS
DIASTOLIC BLOOD PRESSURE: 72 MMHG | BODY MASS INDEX: 34.31 KG/M2 | SYSTOLIC BLOOD PRESSURE: 106 MMHG | HEART RATE: 92 BPM | HEIGHT: 64 IN | WEIGHT: 201 LBS

## 2022-10-04 DIAGNOSIS — I50.32 DIASTOLIC CHF, CHRONIC: ICD-10-CM

## 2022-10-04 DIAGNOSIS — I10 HYPERTENSION, ESSENTIAL: Primary | ICD-10-CM

## 2022-10-04 DIAGNOSIS — I48.0 PAROXYSMAL ATRIAL FIBRILLATION: ICD-10-CM

## 2022-10-04 PROCEDURE — 99214 OFFICE O/P EST MOD 30 MIN: CPT | Performed by: SPECIALIST

## 2022-11-28 RX ORDER — FUROSEMIDE 40 MG/1
TABLET ORAL
Qty: 60 TABLET | Refills: 5 | Status: SHIPPED | OUTPATIENT
Start: 2022-11-28 | End: 2023-02-27

## 2022-11-28 RX ORDER — CARVEDILOL 6.25 MG/1
TABLET ORAL
Qty: 60 TABLET | Refills: 5 | Status: SHIPPED | OUTPATIENT
Start: 2022-11-28

## 2022-11-28 RX ORDER — PANTOPRAZOLE SODIUM 40 MG/1
40 TABLET, DELAYED RELEASE ORAL DAILY
Qty: 30 TABLET | Refills: 5 | Status: SHIPPED | OUTPATIENT
Start: 2022-11-28

## 2022-12-01 RX ORDER — APIXABAN 5 MG/1
TABLET, FILM COATED ORAL
Qty: 180 TABLET | Refills: 3 | Status: SHIPPED | OUTPATIENT
Start: 2022-12-01

## 2022-12-12 DIAGNOSIS — I50.21 ACUTE SYSTOLIC CONGESTIVE HEART FAILURE: ICD-10-CM

## 2022-12-12 DIAGNOSIS — I10 HYPERTENSION, ESSENTIAL: ICD-10-CM

## 2022-12-12 DIAGNOSIS — R73.01 IFG (IMPAIRED FASTING GLUCOSE): ICD-10-CM

## 2022-12-12 DIAGNOSIS — M17.0 PRIMARY OSTEOARTHRITIS OF BOTH KNEES: ICD-10-CM

## 2022-12-12 DIAGNOSIS — F41.1 ANXIETY, GENERALIZED: ICD-10-CM

## 2022-12-12 DIAGNOSIS — E66.01 CLASS 3 SEVERE OBESITY DUE TO EXCESS CALORIES WITH SERIOUS COMORBIDITY AND BODY MASS INDEX (BMI) OF 40.0 TO 44.9 IN ADULT: ICD-10-CM

## 2022-12-12 DIAGNOSIS — E78.2 MIXED HYPERLIPIDEMIA: ICD-10-CM

## 2022-12-12 DIAGNOSIS — F32.0 CURRENT MILD EPISODE OF MAJOR DEPRESSIVE DISORDER, UNSPECIFIED WHETHER RECURRENT: ICD-10-CM

## 2022-12-12 DIAGNOSIS — E55.9 VITAMIN D DEFICIENCY: ICD-10-CM

## 2022-12-12 DIAGNOSIS — R06.02 SHORTNESS OF BREATH ON EXERTION: ICD-10-CM

## 2022-12-12 DIAGNOSIS — I48.91 ATRIAL FIBRILLATION, UNSPECIFIED TYPE: ICD-10-CM

## 2022-12-12 RX ORDER — TRAMADOL HYDROCHLORIDE 50 MG/1
TABLET ORAL
Qty: 60 TABLET | Refills: 0 | Status: SHIPPED | OUTPATIENT
Start: 2022-12-12 | End: 2023-01-12

## 2023-01-11 ENCOUNTER — TELEPHONE (OUTPATIENT)
Dept: INTERNAL MEDICINE | Facility: CLINIC | Age: 78
End: 2023-01-11

## 2023-01-11 NOTE — TELEPHONE ENCOUNTER
Caller: Rikki Bella Metzger    Relationship: Self    Best call back number: [phone]    What is the best time to reach you: ANY    Who are you requesting to speak with (clinical staff, provider,  specific staff member): CLINICAL    Do you know the name of the person who called: PATIENT    What was the call regarding: PATIENT WOULD LIKE TO BE ADVISED WHEN SHE WOULD NEED TO COME IN FOR HER LABS. PATIENT ALSO WOULD LIKE TO CONFIRMATION THAT LABS HAVE BEEN ORDERED.     SCHEDULED: 1.30.23    Do you require a callback: YES

## 2023-01-12 DIAGNOSIS — E66.01 CLASS 3 SEVERE OBESITY DUE TO EXCESS CALORIES WITH SERIOUS COMORBIDITY AND BODY MASS INDEX (BMI) OF 40.0 TO 44.9 IN ADULT: ICD-10-CM

## 2023-01-12 DIAGNOSIS — F32.0 CURRENT MILD EPISODE OF MAJOR DEPRESSIVE DISORDER, UNSPECIFIED WHETHER RECURRENT: ICD-10-CM

## 2023-01-12 DIAGNOSIS — E78.2 MIXED HYPERLIPIDEMIA: ICD-10-CM

## 2023-01-12 DIAGNOSIS — I48.91 ATRIAL FIBRILLATION, UNSPECIFIED TYPE: ICD-10-CM

## 2023-01-12 DIAGNOSIS — E55.9 VITAMIN D DEFICIENCY: ICD-10-CM

## 2023-01-12 DIAGNOSIS — M17.0 PRIMARY OSTEOARTHRITIS OF BOTH KNEES: ICD-10-CM

## 2023-01-12 DIAGNOSIS — I10 HYPERTENSION, ESSENTIAL: ICD-10-CM

## 2023-01-12 DIAGNOSIS — F41.1 ANXIETY, GENERALIZED: ICD-10-CM

## 2023-01-12 DIAGNOSIS — R06.02 SHORTNESS OF BREATH ON EXERTION: ICD-10-CM

## 2023-01-12 DIAGNOSIS — I50.21 ACUTE SYSTOLIC CONGESTIVE HEART FAILURE: ICD-10-CM

## 2023-01-12 DIAGNOSIS — R73.01 IFG (IMPAIRED FASTING GLUCOSE): ICD-10-CM

## 2023-01-12 RX ORDER — TRAMADOL HYDROCHLORIDE 50 MG/1
TABLET ORAL
Qty: 60 TABLET | Refills: 0 | Status: SHIPPED | OUTPATIENT
Start: 2023-01-12 | End: 2023-01-30 | Stop reason: SDUPTHER

## 2023-01-24 ENCOUNTER — CLINICAL SUPPORT (OUTPATIENT)
Dept: INTERNAL MEDICINE | Facility: CLINIC | Age: 78
End: 2023-01-24
Payer: MEDICARE

## 2023-01-24 DIAGNOSIS — I48.91 ATRIAL FIBRILLATION, UNSPECIFIED TYPE: ICD-10-CM

## 2023-01-24 DIAGNOSIS — I50.21 ACUTE SYSTOLIC CONGESTIVE HEART FAILURE: ICD-10-CM

## 2023-01-24 DIAGNOSIS — E66.01 CLASS 3 SEVERE OBESITY DUE TO EXCESS CALORIES WITH SERIOUS COMORBIDITY AND BODY MASS INDEX (BMI) OF 40.0 TO 44.9 IN ADULT: ICD-10-CM

## 2023-01-24 DIAGNOSIS — E03.8 HYPOTHYROIDISM DUE TO HASHIMOTO'S THYROIDITIS: ICD-10-CM

## 2023-01-24 DIAGNOSIS — J30.1 SEASONAL ALLERGIC RHINITIS DUE TO POLLEN: ICD-10-CM

## 2023-01-24 DIAGNOSIS — R73.01 IFG (IMPAIRED FASTING GLUCOSE): ICD-10-CM

## 2023-01-24 DIAGNOSIS — Z12.31 SCREENING MAMMOGRAM FOR BREAST CANCER: ICD-10-CM

## 2023-01-24 DIAGNOSIS — E06.3 HYPOTHYROIDISM DUE TO HASHIMOTO'S THYROIDITIS: ICD-10-CM

## 2023-01-24 DIAGNOSIS — M17.0 PRIMARY OSTEOARTHRITIS OF BOTH KNEES: ICD-10-CM

## 2023-01-24 DIAGNOSIS — I10 ESSENTIAL HYPERTENSION: ICD-10-CM

## 2023-01-24 DIAGNOSIS — E78.2 MIXED HYPERLIPIDEMIA: ICD-10-CM

## 2023-01-24 DIAGNOSIS — I10 HYPERTENSION, ESSENTIAL: ICD-10-CM

## 2023-01-24 DIAGNOSIS — F41.1 ANXIETY, GENERALIZED: ICD-10-CM

## 2023-01-24 DIAGNOSIS — K21.9 GERD WITHOUT ESOPHAGITIS: ICD-10-CM

## 2023-01-24 DIAGNOSIS — E55.9 VITAMIN D DEFICIENCY: ICD-10-CM

## 2023-01-24 LAB
25(OH)D3 SERPL-MCNC: 38.6 NG/ML (ref 30–100)
ALBUMIN SERPL-MCNC: 4.2 G/DL (ref 3.5–5.2)
ALBUMIN/GLOB SERPL: 1.7 G/DL
ALP SERPL-CCNC: 70 U/L (ref 39–117)
ALT SERPL W P-5'-P-CCNC: 9 U/L (ref 1–33)
ANION GAP SERPL CALCULATED.3IONS-SCNC: 8 MMOL/L (ref 5–15)
AST SERPL-CCNC: 21 U/L (ref 1–32)
BASOPHILS # BLD AUTO: 0.05 10*3/MM3 (ref 0–0.2)
BASOPHILS NFR BLD AUTO: 0.9 % (ref 0–1.5)
BILIRUB SERPL-MCNC: 0.9 MG/DL (ref 0–1.2)
BUN SERPL-MCNC: 22 MG/DL (ref 8–23)
BUN/CREAT SERPL: 16.3 (ref 7–25)
CALCIUM SPEC-SCNC: 10 MG/DL (ref 8.6–10.5)
CHLORIDE SERPL-SCNC: 103 MMOL/L (ref 98–107)
CHOLEST SERPL-MCNC: 144 MG/DL (ref 0–200)
CO2 SERPL-SCNC: 32 MMOL/L (ref 22–29)
CREAT SERPL-MCNC: 1.35 MG/DL (ref 0.57–1)
DEPRECATED RDW RBC AUTO: 45.5 FL (ref 37–54)
EGFRCR SERPLBLD CKD-EPI 2021: 39.8 ML/MIN/1.73
EOSINOPHIL # BLD AUTO: 0.18 10*3/MM3 (ref 0–0.4)
EOSINOPHIL NFR BLD AUTO: 3.2 % (ref 0.3–6.2)
ERYTHROCYTE [DISTWIDTH] IN BLOOD BY AUTOMATED COUNT: 12.8 % (ref 12.3–15.4)
GLOBULIN UR ELPH-MCNC: 2.5 GM/DL
GLUCOSE SERPL-MCNC: 96 MG/DL (ref 65–99)
HBA1C MFR BLD: 5.4 % (ref 4.8–5.6)
HCT VFR BLD AUTO: 38.1 % (ref 34–46.6)
HDLC SERPL-MCNC: 48 MG/DL (ref 40–60)
HGB BLD-MCNC: 12.7 G/DL (ref 12–15.9)
IMM GRANULOCYTES # BLD AUTO: 0.02 10*3/MM3 (ref 0–0.05)
IMM GRANULOCYTES NFR BLD AUTO: 0.4 % (ref 0–0.5)
LDLC SERPL CALC-MCNC: 80 MG/DL (ref 0–100)
LDLC/HDLC SERPL: 1.67 {RATIO}
LYMPHOCYTES # BLD AUTO: 1.76 10*3/MM3 (ref 0.7–3.1)
LYMPHOCYTES NFR BLD AUTO: 31.7 % (ref 19.6–45.3)
MCH RBC QN AUTO: 31.7 PG (ref 26.6–33)
MCHC RBC AUTO-ENTMCNC: 33.3 G/DL (ref 31.5–35.7)
MCV RBC AUTO: 95 FL (ref 79–97)
MONOCYTES # BLD AUTO: 0.53 10*3/MM3 (ref 0.1–0.9)
MONOCYTES NFR BLD AUTO: 9.5 % (ref 5–12)
NEUTROPHILS NFR BLD AUTO: 3.02 10*3/MM3 (ref 1.7–7)
NEUTROPHILS NFR BLD AUTO: 54.3 % (ref 42.7–76)
NRBC BLD AUTO-RTO: 0.2 /100 WBC (ref 0–0.2)
NT-PROBNP SERPL-MCNC: 1691 PG/ML (ref 0–1800)
PLATELET # BLD AUTO: 165 10*3/MM3 (ref 140–450)
PMV BLD AUTO: 11 FL (ref 6–12)
POTASSIUM SERPL-SCNC: 4.9 MMOL/L (ref 3.5–5.2)
PROT SERPL-MCNC: 6.7 G/DL (ref 6–8.5)
RBC # BLD AUTO: 4.01 10*6/MM3 (ref 3.77–5.28)
SODIUM SERPL-SCNC: 143 MMOL/L (ref 136–145)
T4 FREE SERPL-MCNC: 1.6 NG/DL (ref 0.93–1.7)
TRIGL SERPL-MCNC: 80 MG/DL (ref 0–150)
TSH SERPL DL<=0.05 MIU/L-ACNC: 3.06 UIU/ML (ref 0.27–4.2)
VLDLC SERPL-MCNC: 16 MG/DL (ref 5–40)
WBC NRBC COR # BLD: 5.56 10*3/MM3 (ref 3.4–10.8)

## 2023-01-24 PROCEDURE — 83880 ASSAY OF NATRIURETIC PEPTIDE: CPT | Performed by: INTERNAL MEDICINE

## 2023-01-24 PROCEDURE — 84439 ASSAY OF FREE THYROXINE: CPT | Performed by: INTERNAL MEDICINE

## 2023-01-24 PROCEDURE — 36415 COLL VENOUS BLD VENIPUNCTURE: CPT | Performed by: INTERNAL MEDICINE

## 2023-01-24 PROCEDURE — 80053 COMPREHEN METABOLIC PANEL: CPT | Performed by: INTERNAL MEDICINE

## 2023-01-24 PROCEDURE — 85025 COMPLETE CBC W/AUTO DIFF WBC: CPT | Performed by: INTERNAL MEDICINE

## 2023-01-24 PROCEDURE — 82306 VITAMIN D 25 HYDROXY: CPT | Performed by: INTERNAL MEDICINE

## 2023-01-24 PROCEDURE — 80061 LIPID PANEL: CPT | Performed by: INTERNAL MEDICINE

## 2023-01-24 PROCEDURE — 84443 ASSAY THYROID STIM HORMONE: CPT | Performed by: INTERNAL MEDICINE

## 2023-01-24 PROCEDURE — 83036 HEMOGLOBIN GLYCOSYLATED A1C: CPT | Performed by: INTERNAL MEDICINE

## 2023-01-30 ENCOUNTER — OFFICE VISIT (OUTPATIENT)
Dept: INTERNAL MEDICINE | Facility: CLINIC | Age: 78
End: 2023-01-30
Payer: MEDICARE

## 2023-01-30 VITALS
OXYGEN SATURATION: 92 % | SYSTOLIC BLOOD PRESSURE: 114 MMHG | DIASTOLIC BLOOD PRESSURE: 67 MMHG | BODY MASS INDEX: 34.26 KG/M2 | TEMPERATURE: 98.2 F | HEART RATE: 100 BPM | WEIGHT: 199.6 LBS

## 2023-01-30 DIAGNOSIS — F41.1 ANXIETY, GENERALIZED: ICD-10-CM

## 2023-01-30 DIAGNOSIS — E55.9 VITAMIN D DEFICIENCY: ICD-10-CM

## 2023-01-30 DIAGNOSIS — R73.01 IFG (IMPAIRED FASTING GLUCOSE): ICD-10-CM

## 2023-01-30 DIAGNOSIS — E66.01 CLASS 3 SEVERE OBESITY DUE TO EXCESS CALORIES WITH SERIOUS COMORBIDITY AND BODY MASS INDEX (BMI) OF 40.0 TO 44.9 IN ADULT: ICD-10-CM

## 2023-01-30 DIAGNOSIS — E78.2 MIXED HYPERLIPIDEMIA: ICD-10-CM

## 2023-01-30 DIAGNOSIS — R06.02 SHORTNESS OF BREATH ON EXERTION: ICD-10-CM

## 2023-01-30 DIAGNOSIS — I50.21 ACUTE SYSTOLIC CONGESTIVE HEART FAILURE: ICD-10-CM

## 2023-01-30 DIAGNOSIS — I10 HYPERTENSION, ESSENTIAL: ICD-10-CM

## 2023-01-30 DIAGNOSIS — F32.0 CURRENT MILD EPISODE OF MAJOR DEPRESSIVE DISORDER, UNSPECIFIED WHETHER RECURRENT: ICD-10-CM

## 2023-01-30 DIAGNOSIS — I48.91 ATRIAL FIBRILLATION, UNSPECIFIED TYPE: ICD-10-CM

## 2023-01-30 DIAGNOSIS — M17.0 PRIMARY OSTEOARTHRITIS OF BOTH KNEES: ICD-10-CM

## 2023-01-30 PROCEDURE — 99214 OFFICE O/P EST MOD 30 MIN: CPT | Performed by: INTERNAL MEDICINE

## 2023-01-30 RX ORDER — TRAMADOL HYDROCHLORIDE 50 MG/1
50 TABLET ORAL EVERY 6 HOURS PRN
Qty: 90 TABLET | Refills: 2 | Status: SHIPPED | OUTPATIENT
Start: 2023-01-30

## 2023-01-30 RX ORDER — IBUPROFEN 800 MG/1
TABLET ORAL EVERY 8 HOURS SCHEDULED
COMMUNITY

## 2023-01-30 RX ORDER — OLMESARTAN MEDOXOMIL AND HYDROCHLOROTHIAZIDE 20/12.5 20; 12.5 MG/1; MG/1
TABLET ORAL EVERY 24 HOURS
COMMUNITY
End: 2023-01-30

## 2023-01-30 NOTE — PROGRESS NOTES
CHIEF COMPLAINT:  Hypertension (Pt here for follow up. Pt has medication concerns for dentist visit. )      HPI: Here for checkup, is having a tooth problems concerned about medications that might have to be adjusted prior to procedure, here with her son today,    Wearing back brace which helps with her back pain,  Objective   Vital Signs  Vitals:    01/30/23 0909   BP: 114/67   Pulse: 100   Temp: 98.2 °F (36.8 °C)   SpO2: 92%   Weight: 90.5 kg (199 lb 9.6 oz)      Body mass index is 34.26 kg/m².  Review of Systems   Constitutional: Negative.    HENT: Positive for dental problem.    Eyes: Negative.    Respiratory: Negative.    Cardiovascular: Negative.    Gastrointestinal: Negative.    Endocrine: Negative.    Genitourinary: Negative.    Musculoskeletal: Negative.    Allergic/Immunologic: Negative.    Neurological: Negative.    Hematological: Negative.    Psychiatric/Behavioral: Negative.       Physical Exam  Constitutional:       General: She is not in acute distress.     Appearance: Normal appearance.   HENT:      Head: Normocephalic.      Mouth/Throat:      Mouth: Mucous membranes are moist.   Eyes:      Conjunctiva/sclera: Conjunctivae normal.      Pupils: Pupils are equal, round, and reactive to light.   Cardiovascular:      Rate and Rhythm: Normal rate and regular rhythm.      Pulses: Normal pulses.      Heart sounds: Normal heart sounds.   Pulmonary:      Effort: Pulmonary effort is normal.      Breath sounds: Normal breath sounds.   Abdominal:      General: Abdomen is flat. Bowel sounds are normal.      Palpations: Abdomen is soft.   Musculoskeletal:         General: No swelling. Normal range of motion.      Cervical back: Neck supple.   Skin:     General: Skin is warm and dry.      Coloration: Skin is not jaundiced.   Neurological:      General: No focal deficit present.      Mental Status: She is alert and oriented to person, place, and time. Mental status is at baseline.   Psychiatric:         Mood and  Affect: Mood normal.         Behavior: Behavior normal.         Thought Content: Thought content normal.         Judgment: Judgment normal.        Result Review :   Lab Results   Component Value Date    PROBNP 1,691.0 01/24/2023    PROBNP 1,570.0 09/22/2022    PROBNP 1,841.0 (H) 05/25/2022     CMP    CMP 5/25/22 9/22/22 1/24/23   Glucose 115 (A) 100 (A) 96   BUN 12 16 22   Creatinine 1.13 (A) 1.12 (A) 1.35 (A)   eGFR 49.6 (A) 50.1 (A) 39.8 (A)   Sodium 138 141 143   Potassium 4.2 4.2 4.9   Chloride 101 102 103   Calcium 9.9 9.7 10.0   Total Protein 7.3 6.7 6.7   Albumin 4.00 4.40 4.2   Globulin 3.3 2.3 2.5   Total Bilirubin 0.7 0.8 0.9   Alkaline Phosphatase 70 70 70   AST (SGOT) 17 17 21   ALT (SGPT) 8 10 9   Albumin/Globulin Ratio 1.2 1.9 1.7   BUN/Creatinine Ratio 10.6 14.3 16.3   Anion Gap 10.9 8.0 8.0   (A) Abnormal value       Comments are available for some flowsheets but are not being displayed.           CBC w/diff    CBC w/Diff 5/25/22 9/22/22 1/24/23   WBC 8.58 6.00 5.56   RBC 4.09 3.91 4.01   Hemoglobin 13.2 12.2 12.7   Hematocrit 39.7 37.2 38.1   MCV 97.1 (A) 95.1 95.0   MCH 32.3 31.2 31.7   MCHC 33.2 32.8 33.3   RDW 12.5 13.0 12.8   Platelets 254 201 165   Neutrophil Rel % 65.4 59.8 54.3   Immature Granulocyte Rel % 0.2 0.2 0.4   Lymphocyte Rel % 23.1 27.7 31.7   Monocyte Rel % 8.0 8.8 9.5   Eosinophil Rel % 2.7 2.5 3.2   Basophil Rel % 0.6 1.0 0.9   (A) Abnormal value             Lipid Panel    Lipid Panel 3/4/22 9/22/22 1/24/23   Total Cholesterol 174 123 144   Triglycerides 181 (A) 104 80   HDL Cholesterol 37 (A) 39 (A) 48   VLDL Cholesterol 32 19 16   LDL Cholesterol  105 (A) 65 80   LDL/HDL Ratio 2.72 1.62 1.67   (A) Abnormal value             Lab Results   Component Value Date    TSH 3.060 01/24/2023    TSH 2.980 09/22/2022    TSH 2.500 03/04/2022      Lab Results   Component Value Date    FREET4 1.60 01/24/2023    FREET4 1.51 09/22/2022    FREET4 1.68 03/04/2022      A1C Last 3 Results    HGBA1C  Last 3 Results 5/25/22 9/22/22 1/24/23   Hemoglobin A1C 5.90 (A) 5.80 (A) 5.40   (A) Abnormal value                              Visit Diagnoses:    ICD-10-CM ICD-9-CM   1. Hypertension, essential  I10 401.9   2. Mixed hyperlipidemia  E78.2 272.2   3. Class 3 severe obesity due to excess calories with serious comorbidity and body mass index (BMI) of 40.0 to 44.9 in adult ([facility])  E66.01 278.01    Z68.41 V85.41   4. Current mild episode of major depressive disorder, unspecified whether recurrent ([facility])  F32.0 296.21   5. Vitamin D deficiency  E55.9 268.9   6. Primary osteoarthritis of both knees  M17.0 715.16   7. Anxiety, generalized  F41.1 300.02   8. Acute systolic congestive heart failure ([facility])  I50.21 428.21     428.0   9. Shortness of breath on exertion  R06.02 786.05   10. Atrial fibrillation, unspecified type ([facility])  I48.91 427.31   11. IFG (impaired fasting glucose)  R73.01 790.21       Assessment and Plan   Diagnoses and all orders for this visit:    1. Hypertension, essential  -     traMADol (ULTRAM) 50 MG tablet; Take 1 tablet by mouth Every 6 (Six) Hours As Needed for Moderate Pain.  Dispense: 90 tablet; Refill: 2  -     Comprehensive Metabolic Panel; Future  -     CBC & Differential; Future  -     proBNP; Future  -     Magnesium; Future    2. Mixed hyperlipidemia  -     traMADol (ULTRAM) 50 MG tablet; Take 1 tablet by mouth Every 6 (Six) Hours As Needed for Moderate Pain.  Dispense: 90 tablet; Refill: 2  -     Comprehensive Metabolic Panel; Future  -     CBC & Differential; Future  -     proBNP; Future  -     Magnesium; Future    3. Class 3 severe obesity due to excess calories with serious comorbidity and body mass index (BMI) of 40.0 to 44.9 in adult ([facility])  -     traMADol (ULTRAM) 50 MG tablet; Take 1 tablet by mouth Every 6 (Six) Hours As Needed for Moderate Pain.  Dispense: 90 tablet; Refill: 2  -     Comprehensive Metabolic Panel; Future  -     CBC & Differential; Future  -     proBNP; Future  -      Magnesium; Future    4. Current mild episode of major depressive disorder, unspecified whether recurrent (H[facility])  -     traMADol (ULTRAM) 50 MG tablet; Take 1 tablet by mouth Every 6 (Six) Hours As Needed for Moderate Pain.  Dispense: 90 tablet; Refill: 2  -     Comprehensive Metabolic Panel; Future  -     CBC & Differential; Future  -     proBNP; Future  -     Magnesium; Future    5. Vitamin D deficiency  -     traMADol (ULTRAM) 50 MG tablet; Take 1 tablet by mouth Every 6 (Six) Hours As Needed for Moderate Pain.  Dispense: 90 tablet; Refill: 2  -     Comprehensive Metabolic Panel; Future  -     CBC & Differential; Future  -     proBNP; Future  -     Magnesium; Future    6. Primary osteoarthritis of both knees  -     traMADol (ULTRAM) 50 MG tablet; Take 1 tablet by mouth Every 6 (Six) Hours As Needed for Moderate Pain.  Dispense: 90 tablet; Refill: 2  -     Comprehensive Metabolic Panel; Future  -     CBC & Differential; Future  -     proBNP; Future  -     Magnesium; Future    7. Anxiety, generalized  -     traMADol (ULTRAM) 50 MG tablet; Take 1 tablet by mouth Every 6 (Six) Hours As Needed for Moderate Pain.  Dispense: 90 tablet; Refill: 2  -     Comprehensive Metabolic Panel; Future  -     CBC & Differential; Future  -     proBNP; Future  -     Magnesium; Future    8. Acute systolic congestive heart failure (HCC)  -     traMADol (ULTRAM) 50 MG tablet; Take 1 tablet by mouth Every 6 (Six) Hours As Needed for Moderate Pain.  Dispense: 90 tablet; Refill: 2  -     Comprehensive Metabolic Panel; Future  -     CBC & Differential; Future  -     proBNP; Future  -     Magnesium; Future    9. Shortness of breath on exertion  -     traMADol (ULTRAM) 50 MG tablet; Take 1 tablet by mouth Every 6 (Six) Hours As Needed for Moderate Pain.  Dispense: 90 tablet; Refill: 2  -     Comprehensive Metabolic Panel; Future  -     CBC & Differential; Future  -     proBNP; Future  -     Magnesium; Future    10. Atrial fibrillation,  unspecified type (HCC)  -     traMADol (ULTRAM) 50 MG tablet; Take 1 tablet by mouth Every 6 (Six) Hours As Needed for Moderate Pain.  Dispense: 90 tablet; Refill: 2  -     Comprehensive Metabolic Panel; Future  -     CBC & Differential; Future  -     proBNP; Future  -     Magnesium; Future    11. IFG (impaired fasting glucose)  -     traMADol (ULTRAM) 50 MG tablet; Take 1 tablet by mouth Every 6 (Six) Hours As Needed for Moderate Pain.  Dispense: 90 tablet; Refill: 2  -     Comprehensive Metabolic Panel; Future  -     CBC & Differential; Future  -     proBNP; Future  -     Magnesium; Future        Skin lesion, face, will refer to dermatology    Patient with orthostasis presyncopal symptoms in March 2022 we stop spironolactone, and we stopped lisinopril due to low blood pressures and she is feeling much better, May 2022, also stop digoxin,    Midthoracic back pain--- continues tramadol as needed every 6 as needed, continues,---    cKD, stage IIIa, creatinine stable at 1.35 January 24, 2023    New onset acute CHF.  Patient had echo on 2/5/2021 which revealed EF 55 to 60%.  with mild MR as well, study was technically difficult. At that time there was not any significant LVH noted on that echo either. Most likely related to new onset A. Fib = tachycardia induced cardiomyopathy,   --> current Echo came back with EF 35 to 40% with moderate global hypokinesis.,  Technically difficult study, moderately dilated left atrium and right atrium moderate mitral annular calcification with thickened leaflets and mild to moderate mitral stenosis minimal prolapse of the anterior mitral leaflet mild aortic sclerosis with trivial aortic regurgitation mild right ventricular pressure elevations at 44 mmHg dilated IVC consistent with elevated central venous pressures moderate size left pleural effusion trivial pericardial effusion------  defer to cardiology on this.  She is diuresing, so we will continue Lasix 40 mg twice daily, potassium  pills  2 tabs twice a day ,  Eliquis 5 mg twice daily,  carvedilol 6.25 mg twice a day -------- BR NP level stable at 1691 echocardiogram August 1, 2022 shows fibrocalcific mitral and aortic valves normal LV function moderate mitral and moderate mitral stenosis with regurgitation mild tricuspid regurgitation    atrial fibrillation.  -continue on carvedilol, 6.25 twice daily,  Eliquis 5 mg twice daily,      hypertension.  CONT  carvedilol,, 6.25 mg twice daily, Lasix 40 mg twice a day diuretics,, potassium 2 tablets twice a day---     Bilateral pleural effusions. CT chest December 10, 2021, shows bilateral pleural effusions, compressive atelectasis and pulmonary edema----, x-ray December 20 showed small pleural effusion ------ aggressive diuretic regimen-----------------, repeat CT scan December 17, 2021 shows no change moderate pulmonary edema bilateral pleural effusions of moderate degree most likely cardiac in nature    Impaired fasting glucose, hemoglobin A1c, 5.4 improved January 24, 2023 no treatment    Insomnia, continues on temazepam 15 mg nightly as needed    Left total knee arthroplasty January 2014, [facility], right total knee arthroplasty 2013    Vitamin D deficiency continues on replacement therapy,    Cologuard testing negative, January 2020    Breast cancer left mastectomy January 2011--- benign mammogram May 2022 right sided    Abdominal pains--- colonoscopy July 2021 no obvious lesions, Dr. Perez    Follow Up   No follow-ups on file.  Patient was given instructions and counseling regarding her condition or for health maintenance advice. Please see specific information pulled into the AVS if appropriate.

## 2023-02-06 ENCOUNTER — TELEPHONE (OUTPATIENT)
Dept: INTERNAL MEDICINE | Facility: CLINIC | Age: 78
End: 2023-02-06
Payer: MEDICARE

## 2023-02-27 RX ORDER — FUROSEMIDE 40 MG/1
TABLET ORAL
Qty: 60 TABLET | Refills: 5 | Status: SHIPPED | OUTPATIENT
Start: 2023-02-27

## 2023-02-27 RX ORDER — FUROSEMIDE 40 MG/1
TABLET ORAL
Qty: 60 TABLET | Refills: 5 | OUTPATIENT
Start: 2023-02-27

## 2023-03-02 ENCOUNTER — TELEPHONE (OUTPATIENT)
Dept: INTERNAL MEDICINE | Facility: CLINIC | Age: 78
End: 2023-03-02
Payer: MEDICARE

## 2023-03-02 RX ORDER — POTASSIUM CHLORIDE 750 MG/1
20 CAPSULE, EXTENDED RELEASE ORAL 2 TIMES DAILY WITH MEALS
Qty: 180 CAPSULE | Refills: 3 | Status: SHIPPED | OUTPATIENT
Start: 2023-03-02

## 2023-03-02 NOTE — TELEPHONE ENCOUNTER
Caller: MARIAELENA CARTER    Relationship: Emergency Contact    Best call back number: [phone]    Requested Prescriptions:   Requested Prescriptions     Pending Prescriptions Disp Refills   • potassium chloride (MICRO-K) 10 MEQ CR capsule 180 capsule 3     Sig: Take 2 capsules by mouth 2 (Two) Times a Day With Meals.        [id number] - [place] [address] - [phone] [facility] [phone] FX     Additional details provided by patient: PATIENTS SON CALLED STATING THAT THE PATIENT NEEDS THE MEDICATION ABOVE REFILLED. PATIENTS SON STATES PATIENT ONLY HAS ONE DAY LEFT.    Does the patient have less than a 3 day supply:  [x] Yes  [] No    Would you like a call back once the refill request has been completed: [x] Yes [] No    If the office needs to give you a call back, can they leave a voicemail: [] Yes [] No    Iman Davis, PCT   03/02/23 10:51 EST

## 2023-05-04 NOTE — PROGRESS NOTES
[facility]  Cardiology progress Note    Patient Name: Bella Brandt  : 1945    CHIEF COMPLAINT  Atrial fibrillation        Subjective   Subjective     HISTORY OF PRESENT ILLNESS    Bella Brandt is a 78 y.o. female with history of paroxysmal atrial fibrillation and hypertension.  No chest pain or shortness of breath    REVIEW OF SYSTEMS    Constitutional:    No fever, no weight loss  Skin:     No rash  Otolaryngeal:    No difficulty swallowing  Cardiovascular: See HPI.  Pulmonary:    No cough, no sputum production    Personal History     Social History:    reports that she quit smoking about 56 years ago. Her smoking use included cigarettes. She has never used smokeless tobacco. She reports that she does not currently use alcohol. She reports that she does not use drugs.    Home Medications:  Current Outpatient Medications on File Prior to Visit   Medication Sig   • carvedilol (COREG) 6.25 MG tablet TAKE 1 TABLET BY MOUTH TWICE DAILY WITH MEALS   • Eliquis 5 MG tablet tablet TAKE 1 TABLET BY MOUTH EVERY 12 HOURS   • furosemide (LASIX) 40 MG tablet TAKE 1 TABLET BY MOUTH TWICE DAILY   • ibuprofen (ADVIL,MOTRIN) 800 MG tablet Every 8 (Eight) Hours.   • Magnesium 500 MG capsule Take 500 mg by mouth Every Other Day.   • pantoprazole (PROTONIX) 40 MG EC tablet TAKE 1 TABLET BY MOUTH DAILY   • polyethylene glycol (MIRALAX) 17 g packet Take 17 g by mouth Daily As Needed (Use if senna-docusate is ineffective).   • potassium chloride (MICRO-K) 10 MEQ CR capsule Take 2 capsules by mouth 2 (Two) Times a Day With Meals.   • traMADol (ULTRAM) 50 MG tablet Take 1 tablet by mouth Every 6 (Six) Hours As Needed for Moderate Pain.     No current facility-administered medications on file prior to visit.       Past Medical History:   Diagnosis Date   • Allergies    • Atrial fibrillation, persistent 12/10/2021   • Chronic fatigue, unspecified    • Depression    • Diverticulitis of large intestine without  perforation or abscess without bleeding 9/9/2021   • Essential (primary) hypertension .   • GERD without esophagitis    • Hypothyroidism    • Syncope and collapse    • Type 2 diabetes mellitus    • Vitamin D deficiency, unspecified        Allergies:  No Known Allergies    Objective    Objective       Vitals:   Heart Rate:  [111] 111  BP: (124)/(89) 124/89  Body mass index is 33.3 kg/m².     PHYSICAL EXAM:    General Appearance:   · well developed  · well nourished  HENT:   · oropharynx moist  · lips not cyanotic  Neck:  · thyroid not enlarged  · supple  Respiratory:  · no respiratory distress  · normal breath sounds  · no rales  Cardiovascular:  · no jugular venous distention  · regular rhythm  · apical impulse normal  · S1 normal, S2 normal  · no S3, no S4   · SM GR 3/6 MA  · no rub, no thrill  · carotid pulses normal; no bruit  · pedal pulses normal  · lower extremity edema: none    Skin:   · warm, dry  Psychiatric:  · judgement and insight appropriate  · normal mood and affect        Result Review:  I have personally reviewed the available results from  [x]  Laboratory  [x]  EKG  [x]  Cardiology  [x]  Medications  [x]  Old records  []  Other:     Procedures  Lab Results   Component Value Date    CHOL 144 01/24/2023    CHOL 123 09/22/2022    CHOL 174 03/04/2022     Lab Results   Component Value Date    TRIG 80 01/24/2023    TRIG 104 09/22/2022    TRIG 181 (H) 03/04/2022     Lab Results   Component Value Date    HDL 48 01/24/2023    HDL 39 (L) 09/22/2022    HDL 37 (L) 03/04/2022     Lab Results   Component Value Date    LDL 80 01/24/2023    LDL 65 09/22/2022     (H) 03/04/2022     Lab Results   Component Value Date    VLDL 16 01/24/2023    VLDL 19 09/22/2022    VLDL 32 03/04/2022     Results for orders placed in visit on 08/01/22    Adult Transthoracic Echo Complete W/ Cont if Necessary Per Protocol    Interpretation Summary  Fibrocalcific mitral and aortic valves.  Normal left ventricular systolic  function.  Moderate mitral regurgitation.  Moderate mitral stenosis.  Mild tricuspid regurgitation.     Impression/Plan:  1.  Paroxysmal atrial fibrillation: Continue carvedilol 6.25 mg twice a day.  Continue Eliquis 5 mg twice a day for stroke prevention.  Able to tolerate Eliquis without any side effects.  2.  Chronic diastolic heart failure stable: Continue Lasix 40 mg once a day.  3.  Moderate mitral stenosis/mitral regurgitation: Asymptomatic.  4.  Essential hypertension controlled: Continue carvedilol 6.25 mg twice a day.        Byron Lopez MD   05/05/23   12:09 EDT

## 2023-05-05 ENCOUNTER — OFFICE VISIT (OUTPATIENT)
Dept: CARDIOLOGY | Facility: CLINIC | Age: 78
End: 2023-05-05
Payer: MEDICARE

## 2023-05-05 VITALS
SYSTOLIC BLOOD PRESSURE: 124 MMHG | HEIGHT: 64 IN | BODY MASS INDEX: 33.12 KG/M2 | WEIGHT: 194 LBS | HEART RATE: 111 BPM | DIASTOLIC BLOOD PRESSURE: 89 MMHG

## 2023-05-05 DIAGNOSIS — I10 HYPERTENSION, ESSENTIAL: Primary | ICD-10-CM

## 2023-05-05 DIAGNOSIS — I34.0 NONRHEUMATIC MITRAL VALVE REGURGITATION: ICD-10-CM

## 2023-05-05 DIAGNOSIS — I48.0 PAROXYSMAL ATRIAL FIBRILLATION: ICD-10-CM

## 2023-05-05 PROCEDURE — 99214 OFFICE O/P EST MOD 30 MIN: CPT | Performed by: SPECIALIST

## 2023-05-05 PROCEDURE — 1160F RVW MEDS BY RX/DR IN RCRD: CPT | Performed by: SPECIALIST

## 2023-05-05 PROCEDURE — 3074F SYST BP LT 130 MM HG: CPT | Performed by: SPECIALIST

## 2023-05-05 PROCEDURE — 1159F MED LIST DOCD IN RCRD: CPT | Performed by: SPECIALIST

## 2023-05-05 PROCEDURE — 3079F DIAST BP 80-89 MM HG: CPT | Performed by: SPECIALIST

## 2023-05-05 RX ORDER — CARVEDILOL 6.25 MG/1
6.25 TABLET ORAL 2 TIMES DAILY WITH MEALS
Qty: 60 TABLET | Refills: 5 | Status: SHIPPED | OUTPATIENT
Start: 2023-05-05

## 2023-05-26 ENCOUNTER — HOSPITAL ENCOUNTER (OUTPATIENT)
Dept: MAMMOGRAPHY | Facility: HOSPITAL | Age: 78
Discharge: HOME | End: 2023-05-26
Admitting: INTERNAL MEDICINE
Payer: MEDICARE

## 2023-05-26 DIAGNOSIS — I10 HYPERTENSION, ESSENTIAL: ICD-10-CM

## 2023-05-26 DIAGNOSIS — M17.0 PRIMARY OSTEOARTHRITIS OF BOTH KNEES: ICD-10-CM

## 2023-05-26 DIAGNOSIS — R73.01 IFG (IMPAIRED FASTING GLUCOSE): ICD-10-CM

## 2023-05-26 DIAGNOSIS — E66.01 CLASS 3 SEVERE OBESITY DUE TO EXCESS CALORIES WITH SERIOUS COMORBIDITY AND BODY MASS INDEX (BMI) OF 40.0 TO 44.9 IN ADULT: ICD-10-CM

## 2023-05-26 DIAGNOSIS — E06.3 HYPOTHYROIDISM DUE TO HASHIMOTO'S THYROIDITIS: ICD-10-CM

## 2023-05-26 DIAGNOSIS — F41.1 ANXIETY, GENERALIZED: ICD-10-CM

## 2023-05-26 DIAGNOSIS — I50.21 ACUTE SYSTOLIC CONGESTIVE HEART FAILURE: ICD-10-CM

## 2023-05-26 DIAGNOSIS — E78.2 MIXED HYPERLIPIDEMIA: ICD-10-CM

## 2023-05-26 DIAGNOSIS — I10 ESSENTIAL HYPERTENSION: ICD-10-CM

## 2023-05-26 DIAGNOSIS — E03.8 HYPOTHYROIDISM DUE TO HASHIMOTO'S THYROIDITIS: ICD-10-CM

## 2023-05-26 DIAGNOSIS — E55.9 VITAMIN D DEFICIENCY: ICD-10-CM

## 2023-05-26 DIAGNOSIS — K21.9 GERD WITHOUT ESOPHAGITIS: ICD-10-CM

## 2023-05-26 DIAGNOSIS — I48.91 ATRIAL FIBRILLATION, UNSPECIFIED TYPE: ICD-10-CM

## 2023-05-26 DIAGNOSIS — J30.1 SEASONAL ALLERGIC RHINITIS DUE TO POLLEN: ICD-10-CM

## 2023-05-26 DIAGNOSIS — Z12.31 SCREENING MAMMOGRAM FOR BREAST CANCER: ICD-10-CM

## 2023-05-26 PROCEDURE — 77063 BREAST TOMOSYNTHESIS BI: CPT

## 2023-05-26 PROCEDURE — 77067 SCR MAMMO BI INCL CAD: CPT

## 2023-05-30 RX ORDER — PANTOPRAZOLE SODIUM 40 MG/1
40 TABLET, DELAYED RELEASE ORAL DAILY
Qty: 30 TABLET | Refills: 5 | Status: SHIPPED | OUTPATIENT
Start: 2023-05-30

## 2023-08-10 ENCOUNTER — LAB (OUTPATIENT)
Dept: INTERNAL MEDICINE | Facility: CLINIC | Age: 78
End: 2023-08-10
Payer: MEDICARE

## 2023-08-10 DIAGNOSIS — R06.02 SHORTNESS OF BREATH ON EXERTION: ICD-10-CM

## 2023-08-10 DIAGNOSIS — I10 HYPERTENSION, ESSENTIAL: ICD-10-CM

## 2023-08-10 DIAGNOSIS — I50.21 ACUTE SYSTOLIC CONGESTIVE HEART FAILURE: ICD-10-CM

## 2023-08-10 DIAGNOSIS — F32.0 CURRENT MILD EPISODE OF MAJOR DEPRESSIVE DISORDER, UNSPECIFIED WHETHER RECURRENT: ICD-10-CM

## 2023-08-10 DIAGNOSIS — F41.1 ANXIETY, GENERALIZED: ICD-10-CM

## 2023-08-10 DIAGNOSIS — R73.01 IFG (IMPAIRED FASTING GLUCOSE): ICD-10-CM

## 2023-08-10 DIAGNOSIS — M17.0 PRIMARY OSTEOARTHRITIS OF BOTH KNEES: ICD-10-CM

## 2023-08-10 DIAGNOSIS — E55.9 VITAMIN D DEFICIENCY: ICD-10-CM

## 2023-08-10 DIAGNOSIS — I48.91 ATRIAL FIBRILLATION, UNSPECIFIED TYPE: ICD-10-CM

## 2023-08-10 DIAGNOSIS — E78.2 MIXED HYPERLIPIDEMIA: ICD-10-CM

## 2023-08-10 DIAGNOSIS — E66.01 CLASS 3 SEVERE OBESITY DUE TO EXCESS CALORIES WITH SERIOUS COMORBIDITY AND BODY MASS INDEX (BMI) OF 40.0 TO 44.9 IN ADULT: ICD-10-CM

## 2023-08-10 LAB
ALBUMIN SERPL-MCNC: 4.3 G/DL (ref 3.5–5.2)
ALBUMIN/GLOB SERPL: 1.8 G/DL
ALP SERPL-CCNC: 68 U/L (ref 39–117)
ALT SERPL W P-5'-P-CCNC: 10 U/L (ref 1–33)
ANION GAP SERPL CALCULATED.3IONS-SCNC: 10.6 MMOL/L (ref 5–15)
AST SERPL-CCNC: 21 U/L (ref 1–32)
BASOPHILS # BLD AUTO: 0.03 10*3/MM3 (ref 0–0.2)
BASOPHILS NFR BLD AUTO: 0.6 % (ref 0–1.5)
BILIRUB SERPL-MCNC: 0.7 MG/DL (ref 0–1.2)
BUN SERPL-MCNC: 18 MG/DL (ref 8–23)
BUN/CREAT SERPL: 18.2 (ref 7–25)
CALCIUM SPEC-SCNC: 9.9 MG/DL (ref 8.6–10.5)
CHLORIDE SERPL-SCNC: 103 MMOL/L (ref 98–107)
CO2 SERPL-SCNC: 28.4 MMOL/L (ref 22–29)
CREAT SERPL-MCNC: 0.99 MG/DL (ref 0.57–1)
DEPRECATED RDW RBC AUTO: 43.9 FL (ref 37–54)
EGFRCR SERPLBLD CKD-EPI 2021: 57.8 ML/MIN/1.73
EOSINOPHIL # BLD AUTO: 0.14 10*3/MM3 (ref 0–0.4)
EOSINOPHIL NFR BLD AUTO: 2.7 % (ref 0.3–6.2)
ERYTHROCYTE [DISTWIDTH] IN BLOOD BY AUTOMATED COUNT: 12.7 % (ref 12.3–15.4)
GLOBULIN UR ELPH-MCNC: 2.4 GM/DL
GLUCOSE SERPL-MCNC: 90 MG/DL (ref 65–99)
HCT VFR BLD AUTO: 37.3 % (ref 34–46.6)
HGB BLD-MCNC: 12.5 G/DL (ref 12–15.9)
IMM GRANULOCYTES # BLD AUTO: 0.01 10*3/MM3 (ref 0–0.05)
IMM GRANULOCYTES NFR BLD AUTO: 0.2 % (ref 0–0.5)
LYMPHOCYTES # BLD AUTO: 1.53 10*3/MM3 (ref 0.7–3.1)
LYMPHOCYTES NFR BLD AUTO: 29.5 % (ref 19.6–45.3)
MAGNESIUM SERPL-MCNC: 2.5 MG/DL (ref 1.6–2.4)
MCH RBC QN AUTO: 31.7 PG (ref 26.6–33)
MCHC RBC AUTO-ENTMCNC: 33.5 G/DL (ref 31.5–35.7)
MCV RBC AUTO: 94.7 FL (ref 79–97)
MONOCYTES # BLD AUTO: 0.48 10*3/MM3 (ref 0.1–0.9)
MONOCYTES NFR BLD AUTO: 9.3 % (ref 5–12)
NEUTROPHILS NFR BLD AUTO: 2.99 10*3/MM3 (ref 1.7–7)
NEUTROPHILS NFR BLD AUTO: 57.7 % (ref 42.7–76)
NRBC BLD AUTO-RTO: 0 /100 WBC (ref 0–0.2)
NT-PROBNP SERPL-MCNC: 1683 PG/ML (ref 0–1800)
PLATELET # BLD AUTO: 167 10*3/MM3 (ref 140–450)
PMV BLD AUTO: 11.1 FL (ref 6–12)
POTASSIUM SERPL-SCNC: 4.4 MMOL/L (ref 3.5–5.2)
PROT SERPL-MCNC: 6.7 G/DL (ref 6–8.5)
RBC # BLD AUTO: 3.94 10*6/MM3 (ref 3.77–5.28)
SODIUM SERPL-SCNC: 142 MMOL/L (ref 136–145)
WBC NRBC COR # BLD: 5.18 10*3/MM3 (ref 3.4–10.8)

## 2023-08-10 PROCEDURE — 36415 COLL VENOUS BLD VENIPUNCTURE: CPT | Performed by: INTERNAL MEDICINE

## 2023-08-10 PROCEDURE — 83735 ASSAY OF MAGNESIUM: CPT | Performed by: INTERNAL MEDICINE

## 2023-08-10 PROCEDURE — 83880 ASSAY OF NATRIURETIC PEPTIDE: CPT | Performed by: INTERNAL MEDICINE

## 2023-08-10 PROCEDURE — 80053 COMPREHEN METABOLIC PANEL: CPT | Performed by: INTERNAL MEDICINE

## 2023-08-10 PROCEDURE — 85025 COMPLETE CBC W/AUTO DIFF WBC: CPT | Performed by: INTERNAL MEDICINE

## 2023-08-16 ENCOUNTER — OFFICE VISIT (OUTPATIENT)
Dept: INTERNAL MEDICINE | Facility: CLINIC | Age: 78
End: 2023-08-16
Payer: MEDICARE

## 2023-08-16 VITALS
WEIGHT: 200.2 LBS | SYSTOLIC BLOOD PRESSURE: 122 MMHG | BODY MASS INDEX: 34.18 KG/M2 | TEMPERATURE: 96.8 F | HEIGHT: 64 IN | DIASTOLIC BLOOD PRESSURE: 75 MMHG | HEART RATE: 82 BPM | OXYGEN SATURATION: 96 %

## 2023-08-16 DIAGNOSIS — G93.32 CHRONIC FATIGUE SYNDROME: ICD-10-CM

## 2023-08-16 DIAGNOSIS — Z00.00 MEDICARE ANNUAL WELLNESS VISIT, SUBSEQUENT: Primary | ICD-10-CM

## 2023-08-16 DIAGNOSIS — E66.01 CLASS 3 SEVERE OBESITY DUE TO EXCESS CALORIES WITH SERIOUS COMORBIDITY AND BODY MASS INDEX (BMI) OF 40.0 TO 44.9 IN ADULT: ICD-10-CM

## 2023-08-16 DIAGNOSIS — E78.2 MIXED HYPERLIPIDEMIA: ICD-10-CM

## 2023-08-16 DIAGNOSIS — E55.9 VITAMIN D DEFICIENCY: ICD-10-CM

## 2023-08-16 DIAGNOSIS — I48.91 ATRIAL FIBRILLATION, UNSPECIFIED TYPE: ICD-10-CM

## 2023-08-16 DIAGNOSIS — E06.3 HYPOTHYROIDISM DUE TO HASHIMOTO'S THYROIDITIS: ICD-10-CM

## 2023-08-16 DIAGNOSIS — K21.9 GERD WITHOUT ESOPHAGITIS: ICD-10-CM

## 2023-08-16 DIAGNOSIS — I95.2 HYPOTENSION DUE TO DRUGS: ICD-10-CM

## 2023-08-16 DIAGNOSIS — I10 HYPERTENSION, ESSENTIAL: ICD-10-CM

## 2023-08-16 DIAGNOSIS — R06.02 SHORTNESS OF BREATH ON EXERTION: ICD-10-CM

## 2023-08-16 DIAGNOSIS — E03.8 HYPOTHYROIDISM DUE TO HASHIMOTO'S THYROIDITIS: ICD-10-CM

## 2023-08-16 DIAGNOSIS — I50.21 ACUTE SYSTOLIC CONGESTIVE HEART FAILURE: ICD-10-CM

## 2023-08-16 DIAGNOSIS — M17.0 PRIMARY OSTEOARTHRITIS OF BOTH KNEES: ICD-10-CM

## 2023-08-16 DIAGNOSIS — Z12.31 SCREENING MAMMOGRAM FOR BREAST CANCER: ICD-10-CM

## 2023-08-16 DIAGNOSIS — I50.32 CHRONIC DIASTOLIC (CONGESTIVE) HEART FAILURE: ICD-10-CM

## 2023-08-16 DIAGNOSIS — F41.1 ANXIETY, GENERALIZED: ICD-10-CM

## 2023-08-16 DIAGNOSIS — R73.01 IFG (IMPAIRED FASTING GLUCOSE): ICD-10-CM

## 2023-08-16 DIAGNOSIS — F32.0 CURRENT MILD EPISODE OF MAJOR DEPRESSIVE DISORDER, UNSPECIFIED WHETHER RECURRENT: ICD-10-CM

## 2023-08-16 RX ORDER — TRAMADOL HYDROCHLORIDE 50 MG/1
50 TABLET ORAL EVERY 6 HOURS PRN
Qty: 90 TABLET | Refills: 5 | Status: SHIPPED | OUTPATIENT
Start: 2023-08-16 | End: 2023-11-14

## 2023-08-16 NOTE — PROGRESS NOTES
The ABCs of the Annual Wellness Visit  Subsequent Medicare Wellness Visit    Subjective      Bella Brandt is a 78 y.o. female who presents for a Subsequent Medicare Wellness Visit.    The following portions of the patient's history were reviewed and   updated as appropriate: allergies, current medications, past family history, past medical history, past social history, past surgical history, and problem list.    Compared to one year ago, the patient feels her physical   health is better.    Compared to one year ago, the patient feels her mental   health is the same.    Recent Hospitalizations:  She was not admitted to the hospital during the last year.       Current Medical Providers:  Patient Care Team:  Taiwo Nazario MD as PCP - General (Internal Medicine)    Outpatient Medications Prior to Visit   Medication Sig Dispense Refill    apixaban (Eliquis) 5 MG tablet tablet Take 1 tablet by mouth Every 12 (Twelve) Hours. 180 tablet 3    carvedilol (COREG) 6.25 MG tablet Take 1 tablet by mouth 2 (Two) Times a Day With Meals. 60 tablet 5    furosemide (LASIX) 40 MG tablet TAKE 1 TABLET BY MOUTH TWICE DAILY 60 tablet 5    Magnesium 500 MG capsule Take 500 mg by mouth Every Other Day.      pantoprazole (PROTONIX) 40 MG EC tablet TAKE 1 TABLET BY MOUTH DAILY 30 tablet 5    polyethylene glycol (MIRALAX) 17 g packet Take 17 g by mouth Daily As Needed (Use if senna-docusate is ineffective). 30 packet 5    potassium chloride (MICRO-K) 10 MEQ CR capsule Take 2 capsules by mouth 2 (Two) Times a Day With Meals. 180 capsule 3    traMADol (ULTRAM) 50 MG tablet TAKE 1 TABLET BY MOUTH EVERY 6 HOURS AS NEEDED FOR MODERATE PAIN 90 tablet 0     No facility-administered medications prior to visit.       Opioid medication/s are on active medication list.  and I have evaluated her active treatment plan and pain score trends (see table).  There were no vitals filed for this visit.  I have reviewed the chart for potential of high  "risk medication and harmful drug interactions in the elderly.          Aspirin is not on active medication list.  Aspirin use is not indicated based on review of current medical condition/s. Risk of harm outweighs potential benefits.  .    Patient Active Problem List   Diagnosis    Hypertension, essential    Vitamin D deficiency    Hypothyroidism    GERD without esophagitis    Depression    Anxiety, generalized    IFG (impaired fasting glucose)    Screening mammogram for breast cancer    Hematochezia    Atrial fibrillation    Acute systolic congestive heart failure    Mixed hyperlipidemia    Class 3 severe obesity due to excess calories with serious comorbidity and body mass index (BMI) of 40.0 to 44.9 in adult    Primary osteoarthritis of both knees    Chronic fatigue syndrome    Seasonal allergic rhinitis    Hypotension due to drugs    Chronic diastolic (congestive) heart failure    Medicare annual wellness visit, subsequent     Advance Care Planning   Advance Care Planning     Advance Directive is on file.  ACP discussion was held with the patient during this visit. Patient has an advance directive in EMR which is still valid.      Objective    Vitals:    08/16/23 1343   BP: 122/75   Pulse: 82   Temp: 96.8 øF (36 øC)   SpO2: 96%   Weight: 90.8 kg (200 lb 3.2 oz)   Height: 162.6 cm (64.02\")     Estimated body mass index is 34.35 kg/mý as calculated from the following:    Height as of this encounter: 162.6 cm (64.02\").    Weight as of this encounter: 90.8 kg (200 lb 3.2 oz).    BMI is >= 30 and <35. (Class 1 Obesity). The following options were offered after discussion;: weight loss educational material (shared in after visit summary), exercise counseling/recommendations, and nutrition counseling/recommendations      Does the patient have evidence of cognitive impairment?   No            HEALTH RISK ASSESSMENT    Smoking Status:  Social History     Tobacco Use   Smoking Status Former    Types: Cigarettes    Quit " date: 1967    Years since quittin.6   Smokeless Tobacco Never     Alcohol Consumption:  Social History     Substance and Sexual Activity   Alcohol Use Not Currently     Fall Risk Screen:    STEADI Fall Risk Assessment was completed, and patient is at LOW risk for falls.Assessment completed on:2023    Depression Screenin/30/2023     9:13 AM   PHQ-2/PHQ-9 Depression Screening   Little Interest or Pleasure in Doing Things 0-->not at all   Feeling Down, Depressed or Hopeless 0-->not at all   PHQ-9: Brief Depression Severity Measure Score 0       Health Habits and Functional and Cognitive Screening:       No data to display                Age-appropriate Screening Schedule:  Refer to the list below for future screening recommendations based on patient's age, sex and/or medical conditions. Orders for these recommended tests are listed in the plan section. The patient has been provided with a written plan.    Health Maintenance   Topic Date Due    DXA SCAN  Never done    TDAP/TD VACCINES (1 - Tdap) Never done    ZOSTER VACCINE (2 of 2) 2018    COLORECTAL CANCER SCREENING  2020    URINE MICROALBUMIN  2022    COVID-19 Vaccine (7 - Mixed Product series) 2023    HEMOGLOBIN A1C  2023    INFLUENZA VACCINE  10/01/2023    DIABETIC EYE EXAM  2023    LIPID PANEL  2024    ANNUAL WELLNESS VISIT  2024    Pneumococcal Vaccine 65+  Completed                  CMS Preventative Services Quick Reference  Risk Factors Identified During Encounter:    Depression/Dysphoria: Patient gets a little depressed at times but is currently not on any medications, we discussed she is got good family support stays active  Inactivity/Sedentary: Patient was advised to exercise at least 150 minutes a week per CDC recommendations. and Patient was advised to do at least 150 minutes a week of moderate intensity activity such as brisk walking and do at least 2 days a week of activities that  strengthen muscles such as resistance training and do activities to improve balance such as standing on one foot about 3 days a week.    The above risks/problems have been discussed with the patient.  Pertinent information has been shared with the patient in the After Visit Summary.    Diagnoses and all orders for this visit:    1. Medicare annual wellness visit, subsequent (Primary)    2. Chronic fatigue syndrome  -     Comprehensive Metabolic Panel; Future  -     CBC & Differential; Future  -     Hemoglobin A1c; Future  -     Lipid Panel; Future  -     MicroAlbumin, Urine, Random - Urine, Clean Catch; Future    3. Screening mammogram for breast cancer  -     Comprehensive Metabolic Panel; Future  -     CBC & Differential; Future  -     Hemoglobin A1c; Future  -     Lipid Panel; Future  -     MicroAlbumin, Urine, Random - Urine, Clean Catch; Future    4. Anxiety, generalized  -     Comprehensive Metabolic Panel; Future  -     CBC & Differential; Future  -     Hemoglobin A1c; Future  -     Lipid Panel; Future  -     MicroAlbumin, Urine, Random - Urine, Clean Catch; Future    5. Primary osteoarthritis of both knees  -     Comprehensive Metabolic Panel; Future  -     CBC & Differential; Future  -     Hemoglobin A1c; Future  -     Lipid Panel; Future  -     MicroAlbumin, Urine, Random - Urine, Clean Catch; Future    6. IFG (impaired fasting glucose)  -     Comprehensive Metabolic Panel; Future  -     CBC & Differential; Future  -     Hemoglobin A1c; Future  -     Lipid Panel; Future  -     MicroAlbumin, Urine, Random - Urine, Clean Catch; Future    7. Hypotension due to drugs  -     Comprehensive Metabolic Panel; Future  -     CBC & Differential; Future  -     Hemoglobin A1c; Future  -     Lipid Panel; Future  -     MicroAlbumin, Urine, Random - Urine, Clean Catch; Future    8. Atrial fibrillation, unspecified type  -     Comprehensive Metabolic Panel; Future  -     CBC & Differential; Future  -     Hemoglobin A1c;  Future  -     Lipid Panel; Future  -     MicroAlbumin, Urine, Random - Urine, Clean Catch; Future    9. Hypothyroidism due to Hashimoto's thyroiditis  -     Comprehensive Metabolic Panel; Future  -     CBC & Differential; Future  -     Hemoglobin A1c; Future  -     Lipid Panel; Future  -     MicroAlbumin, Urine, Random - Urine, Clean Catch; Future    10. GERD without esophagitis  -     Comprehensive Metabolic Panel; Future  -     CBC & Differential; Future  -     Hemoglobin A1c; Future  -     Lipid Panel; Future  -     MicroAlbumin, Urine, Random - Urine, Clean Catch; Future    11. Class 3 severe obesity due to excess calories with serious comorbidity and body mass index (BMI) of 40.0 to 44.9 in adult  -     Comprehensive Metabolic Panel; Future  -     CBC & Differential; Future  -     Hemoglobin A1c; Future  -     Lipid Panel; Future  -     MicroAlbumin, Urine, Random - Urine, Clean Catch; Future    12. Vitamin D deficiency  -     Comprehensive Metabolic Panel; Future  -     CBC & Differential; Future  -     Hemoglobin A1c; Future  -     Lipid Panel; Future  -     MicroAlbumin, Urine, Random - Urine, Clean Catch; Future    13. Hypertension, essential  -     Comprehensive Metabolic Panel; Future  -     CBC & Differential; Future  -     Hemoglobin A1c; Future  -     Lipid Panel; Future  -     MicroAlbumin, Urine, Random - Urine, Clean Catch; Future    14. Mixed hyperlipidemia  -     Comprehensive Metabolic Panel; Future  -     CBC & Differential; Future  -     Hemoglobin A1c; Future  -     Lipid Panel; Future  -     MicroAlbumin, Urine, Random - Urine, Clean Catch; Future    15. Chronic diastolic (congestive) heart failure  -     Comprehensive Metabolic Panel; Future  -     CBC & Differential; Future  -     Hemoglobin A1c; Future  -     Lipid Panel; Future  -     MicroAlbumin, Urine, Random - Urine, Clean Catch; Future        Follow Up:   Next Medicare Wellness visit to be scheduled in 1 year.      An After Visit  Summary and PPPS were made available to the patient.

## 2023-08-16 NOTE — PROGRESS NOTES
"CHIEF COMPLAINT/ HPI:  Hypertension and Follow-up (Patient is here for a routine follow up, )    Still taking pain pills for back on and off    Patient's had some skin cancers removed,, still has chronic back pain on and off, takes medicine as needed      Objective   Vital Signs  Vitals:    08/16/23 1343   BP: 122/75   Pulse: 82   Temp: 96.8 øF (36 øC)   SpO2: 96%   Weight: 90.8 kg (200 lb 3.2 oz)   Height: 162.6 cm (64.02\")      Body mass index is 34.35 kg/mý.  Review of Systems   Constitutional: Negative.    HENT: Negative.     Eyes: Negative.    Respiratory: Negative.     Cardiovascular: Negative.    Gastrointestinal: Negative.    Endocrine: Negative.    Genitourinary: Negative.    Musculoskeletal:  Positive for back pain.   Allergic/Immunologic: Negative.    Neurological: Negative.    Hematological: Negative.    Psychiatric/Behavioral: Negative.      Physical Exam  Constitutional:       General: She is not in acute distress.     Appearance: Normal appearance. She is obese.   HENT:      Head: Normocephalic.      Mouth/Throat:      Mouth: Mucous membranes are moist.   Eyes:      Conjunctiva/sclera: Conjunctivae normal.      Pupils: Pupils are equal, round, and reactive to light.   Cardiovascular:      Rate and Rhythm: Normal rate and regular rhythm.      Pulses: Normal pulses.      Heart sounds: Normal heart sounds.   Pulmonary:      Effort: Pulmonary effort is normal.      Breath sounds: Normal breath sounds.   Abdominal:      General: Bowel sounds are normal.      Palpations: Abdomen is soft.   Musculoskeletal:         General: No swelling. Normal range of motion.      Cervical back: Neck supple.   Skin:     General: Skin is warm and dry.      Coloration: Skin is not jaundiced.   Neurological:      General: No focal deficit present.      Mental Status: She is alert and oriented to person, place, and time. Mental status is at baseline.   Psychiatric:         Mood and Affect: Mood normal.         Behavior: " Behavior normal.         Thought Content: Thought content normal.         Judgment: Judgment normal.   Cerumen impactions partial bilateral  Result Review :   Lab Results   Component Value Date    PROBNP 1,683.0 08/10/2023    PROBNP 1,691.0 01/24/2023    PROBNP 1,570.0 09/22/2022     CMP          9/22/2022    10:06 1/24/2023    09:31 8/10/2023    09:47   CMP   Glucose 100  96  90    BUN 16  22  18    Creatinine 1.12  1.35  0.99    EGFR 50.1  39.8  57.8    Sodium 141  143  142    Potassium 4.2  4.9  4.4    Chloride 102  103  103    Calcium 9.7  10.0  9.9    Total Protein 6.7  6.7  6.7    Albumin 4.40  4.2  4.3    Globulin 2.3  2.5  2.4    Total Bilirubin 0.8  0.9  0.7    Alkaline Phosphatase 70  70  68    AST (SGOT) 17  21  21    ALT (SGPT) 10  9  10    Albumin/Globulin Ratio 1.9  1.7  1.8    BUN/Creatinine Ratio 14.3  16.3  18.2    Anion Gap 8.0  8.0  10.6      CBC w/diff          9/22/2022    10:06 1/24/2023    09:31 8/10/2023    09:47   CBC w/Diff   WBC 6.00  5.56  5.18    RBC 3.91  4.01  3.94    Hemoglobin 12.2  12.7  12.5    Hematocrit 37.2  38.1  37.3    MCV 95.1  95.0  94.7    MCH 31.2  31.7  31.7    MCHC 32.8  33.3  33.5    RDW 13.0  12.8  12.7    Platelets 201  165  167    Neutrophil Rel % 59.8  54.3  57.7    Immature Granulocyte Rel % 0.2  0.4  0.2    Lymphocyte Rel % 27.7  31.7  29.5    Monocyte Rel % 8.8  9.5  9.3    Eosinophil Rel % 2.5  3.2  2.7    Basophil Rel % 1.0  0.9  0.6       Lipid Panel          9/22/2022    10:06 1/24/2023    09:31   Lipid Panel   Total Cholesterol 123  144    Triglycerides 104  80    HDL Cholesterol 39  48    VLDL Cholesterol 19  16    LDL Cholesterol  65  80    LDL/HDL Ratio 1.62  1.67       Lab Results   Component Value Date    TSH 3.060 01/24/2023    TSH 2.980 09/22/2022    TSH 2.500 03/04/2022      Lab Results   Component Value Date    FREET4 1.60 01/24/2023    FREET4 1.51 09/22/2022    FREET4 1.68 03/04/2022      A1C Last 3 Results          9/22/2022    10:06 1/24/2023     09:31   HGBA1C Last 3 Results   Hemoglobin A1C 5.80  5.40                        Visit Diagnoses:    ICD-10-CM ICD-9-CM   1. Medicare annual wellness visit, subsequent  Z00.00 V70.0   2. Chronic fatigue syndrome  G93.32 780.71   3. Screening mammogram for breast cancer  Z12.31 V76.12   4. Anxiety, generalized  F41.1 300.02   5. Primary osteoarthritis of both knees  M17.0 715.16   6. IFG (impaired fasting glucose)  R73.01 790.21   7. Hypotension due to drugs  I95.2 458.8     E947.9   8. Atrial fibrillation, unspecified type  I48.91 427.31   9. Hypothyroidism due to Hashimoto's thyroiditis  E03.8 244.8    E06.3 245.2   10. GERD without esophagitis  K21.9 530.81   11. Class 3 severe obesity due to excess calories with serious comorbidity and body mass index (BMI) of 40.0 to 44.9 in adult  E66.01 278.01    Z68.41 V85.41   12. Vitamin D deficiency  E55.9 268.9   13. Hypertension, essential  I10 401.9   14. Mixed hyperlipidemia  E78.2 272.2   15. Chronic diastolic (congestive) heart failure  I50.32 428.32     428.0       Assessment and Plan   Diagnoses and all orders for this visit:    1. Medicare annual wellness visit, subsequent (Primary)    2. Chronic fatigue syndrome  -     Comprehensive Metabolic Panel; Future  -     CBC & Differential; Future  -     Hemoglobin A1c; Future  -     Lipid Panel; Future  -     MicroAlbumin, Urine, Random - Urine, Clean Catch; Future    3. Screening mammogram for breast cancer  -     Comprehensive Metabolic Panel; Future  -     CBC & Differential; Future  -     Hemoglobin A1c; Future  -     Lipid Panel; Future  -     MicroAlbumin, Urine, Random - Urine, Clean Catch; Future    4. Anxiety, generalized  -     Comprehensive Metabolic Panel; Future  -     CBC & Differential; Future  -     Hemoglobin A1c; Future  -     Lipid Panel; Future  -     MicroAlbumin, Urine, Random - Urine, Clean Catch; Future    5. Primary osteoarthritis of both knees  -     Comprehensive Metabolic Panel; Future  -      CBC & Differential; Future  -     Hemoglobin A1c; Future  -     Lipid Panel; Future  -     MicroAlbumin, Urine, Random - Urine, Clean Catch; Future    6. IFG (impaired fasting glucose)  -     Comprehensive Metabolic Panel; Future  -     CBC & Differential; Future  -     Hemoglobin A1c; Future  -     Lipid Panel; Future  -     MicroAlbumin, Urine, Random - Urine, Clean Catch; Future    7. Hypotension due to drugs  -     Comprehensive Metabolic Panel; Future  -     CBC & Differential; Future  -     Hemoglobin A1c; Future  -     Lipid Panel; Future  -     MicroAlbumin, Urine, Random - Urine, Clean Catch; Future    8. Atrial fibrillation, unspecified type  -     Comprehensive Metabolic Panel; Future  -     CBC & Differential; Future  -     Hemoglobin A1c; Future  -     Lipid Panel; Future  -     MicroAlbumin, Urine, Random - Urine, Clean Catch; Future    9. Hypothyroidism due to Hashimoto's thyroiditis  -     Comprehensive Metabolic Panel; Future  -     CBC & Differential; Future  -     Hemoglobin A1c; Future  -     Lipid Panel; Future  -     MicroAlbumin, Urine, Random - Urine, Clean Catch; Future    10. GERD without esophagitis  -     Comprehensive Metabolic Panel; Future  -     CBC & Differential; Future  -     Hemoglobin A1c; Future  -     Lipid Panel; Future  -     MicroAlbumin, Urine, Random - Urine, Clean Catch; Future    11. Class 3 severe obesity due to excess calories with serious comorbidity and body mass index (BMI) of 40.0 to 44.9 in adult  -     Comprehensive Metabolic Panel; Future  -     CBC & Differential; Future  -     Hemoglobin A1c; Future  -     Lipid Panel; Future  -     MicroAlbumin, Urine, Random - Urine, Clean Catch; Future    12. Vitamin D deficiency  -     Comprehensive Metabolic Panel; Future  -     CBC & Differential; Future  -     Hemoglobin A1c; Future  -     Lipid Panel; Future  -     MicroAlbumin, Urine, Random - Urine, Clean Catch; Future    13. Hypertension, essential  -      Comprehensive Metabolic Panel; Future  -     CBC & Differential; Future  -     Hemoglobin A1c; Future  -     Lipid Panel; Future  -     MicroAlbumin, Urine, Random - Urine, Clean Catch; Future    14. Mixed hyperlipidemia  -     Comprehensive Metabolic Panel; Future  -     CBC & Differential; Future  -     Hemoglobin A1c; Future  -     Lipid Panel; Future  -     MicroAlbumin, Urine, Random - Urine, Clean Catch; Future    15. Chronic diastolic (congestive) heart failure  -     Comprehensive Metabolic Panel; Future  -     CBC & Differential; Future  -     Hemoglobin A1c; Future  -     Lipid Panel; Future  -     MicroAlbumin, Urine, Random - Urine, Clean Catch; Future             Skin lesion, face, will refer to dermatology    orthostasis----- presyncopal symptoms in March 2022 we stop spironolactone, and we stopped lisinopril due to low blood pressures and she is feeling much better, May 2022, also stop digoxin,    Midthoracic back pain--- continues tramadol as needed every 6 as needed, continues    cKD, stage IIIa, creatinine stable at 1.    Chronic diastolic CHF.  Current BNP level 1683 August 2023, stable improved ---------------echo on 2/5/2021 which revealed EF 55 to 60%.  with mild MR as well, study was technically difficult. At that time there was not any significant LVH noted on that echo either. Most likely related to new onset A. Fib = tachycardia induced cardiomyopathy,   --> Echo came back with EF 35 to 40% with moderate global hypokinesis.,  Technically difficult study, moderately dilated left atrium and right atrium moderate mitral annular calcification with thickened leaflets and mild to moderate mitral stenosis minimal prolapse of the anterior mitral leaflet mild aortic sclerosis with trivial aortic regurgitation mild right ventricular pressure elevations at 44 mmHg dilated IVC consistent with elevated central venous pressures moderate size left pleural effusion trivial pericardial effusion------  defer  to cardiology on this.  She is diuresing, so we will continue Lasix 40 mg twice daily, potassium pills  2 tabs twice a day ,  Eliquis 5 mg twice daily,  carvedilol 6.25 mg twice a day -------- BR NP level stable at 1691------------------ echocardiogram August 1, 2022 shows fibrocalcific mitral and aortic valves normal LV function moderate mitral and moderate mitral stenosis with regurgitation mild tricuspid regurgitation    atrial fibrillation.  -continue carvedilol, 6.25 twice daily,  Eliquis 5 mg twice daily,      hypertension.  CONT  carvedilol,, 6.25 mg twice daily, Lasix 40 mg twice a day diuretics,, potassium 2 tablets twice a day---     Bilateral pleural effusions. CT chest December 10, 2021, shows bilateral pleural effusions, compressive atelectasis and pulmonary edema----, x-ray December 20 , 2021---showed small pleural effusion ------ aggressive diuretic regimen-----------------, repeat CT scan December 17, 2021 shows no change moderate pulmonary edema bilateral pleural effusions of moderate degree most likely cardiac in nature    Impaired fasting glucose, hemoglobin A1c, 5.4    Insomnia, continues  temazepam 15 mg nightly as needed    Left total knee arthroplasty January 2014, [facility], right total knee arthroplasty 2013    Vitamin D deficiency continues on replacement therapy,    Cologuard testing negative, January 2020    Breast cancer left mastectomy January 2011--- benign mammogram May 2023 right sided    Abdominal pains--- colonoscopy July 2021 no obvious lesions, Dr. Perez    Follow Up   No follow-ups on file.  Patient was given instructions and counseling regarding her condition or for health maintenance advice. Please see specific information pulled into the AVS if appropriate.

## 2023-08-25 RX ORDER — POTASSIUM CHLORIDE 750 MG/1
CAPSULE, EXTENDED RELEASE ORAL
Qty: 180 CAPSULE | Refills: 3 | Status: SHIPPED | OUTPATIENT
Start: 2023-08-25

## 2023-08-25 RX ORDER — FUROSEMIDE 40 MG/1
TABLET ORAL
Qty: 60 TABLET | Refills: 5 | Status: SHIPPED | OUTPATIENT
Start: 2023-08-25

## 2023-10-24 RX ORDER — CARVEDILOL 6.25 MG/1
6.25 TABLET ORAL 2 TIMES DAILY WITH MEALS
Qty: 180 TABLET | Refills: 1 | Status: SHIPPED | OUTPATIENT
Start: 2023-10-24

## 2023-11-27 RX ORDER — PANTOPRAZOLE SODIUM 40 MG/1
40 TABLET, DELAYED RELEASE ORAL DAILY
Qty: 30 TABLET | Refills: 5 | Status: SHIPPED | OUTPATIENT
Start: 2023-11-27

## 2024-01-22 NOTE — PROGRESS NOTES
[facility]  Cardiology progress Note    Patient Name: Bella Brandt  : 1945    CHIEF COMPLAINT  Atrial fibrillation        Subjective   Subjective     HISTORY OF PRESENT ILLNESS    Bella Brandt is a 79 y.o. female with atrial fibrillation and CHF.  No palpitations.  No chest pain.    REVIEW OF SYSTEMS    Constitutional:    No fever, no weight loss  Skin:     No rash  Otolaryngeal:    No difficulty swallowing  Cardiovascular: See HPI.  Pulmonary:    No cough, no sputum production    Personal History     Social History:    reports that she quit smoking about 57 years ago. Her smoking use included cigarettes. She has never used smokeless tobacco. She reports that she does not currently use alcohol. She reports that she does not use drugs.    Home Medications:  Current Outpatient Medications on File Prior to Visit   Medication Sig    apixaban (Eliquis) 5 MG tablet tablet Take 1 tablet by mouth Every 12 (Twelve) Hours.    carvedilol (COREG) 6.25 MG tablet TAKE 1 TABLET BY MOUTH TWICE DAILY WITH MEALS    furosemide (LASIX) 40 MG tablet TAKE 1 TABLET BY MOUTH TWICE DAILY    Magnesium 500 MG capsule Take 500 mg by mouth Every Other Day.    pantoprazole (PROTONIX) 40 MG EC tablet TAKE 1 TABLET BY MOUTH DAILY    polyethylene glycol (MIRALAX) 17 g packet Take 17 g by mouth Daily As Needed (Use if senna-docusate is ineffective).    potassium chloride (MICRO-K) 10 MEQ CR capsule TAKE 2 CAPSULES BY MOUTH TWICE DAILY WITH MEALS     No current facility-administered medications on file prior to visit.       Past Medical History:   Diagnosis Date    Allergies     Atrial fibrillation, persistent 12/10/2021    Chronic fatigue, unspecified     Depression     Diverticulitis of large intestine without perforation or abscess without bleeding 2021    Essential (primary) hypertension .    GERD without esophagitis     Hypothyroidism     Syncope and collapse     Type 2 diabetes mellitus     Vitamin D deficiency,  unspecified        Allergies:  No Known Allergies    Objective    Objective       Vitals:   Heart Rate:  [80] 80  BP: (110)/(88) 110/88  Body mass index is 35.7 kg/m².     PHYSICAL EXAM:    General Appearance:   well developed  well nourished  HENT:   oropharynx moist  lips not cyanotic  Neck:  thyroid not enlarged  supple  Respiratory:  no respiratory distress  normal breath sounds  no rales  Cardiovascular:  no jugular venous distention  regular rhythm  apical impulse normal  S1 normal, S2 normal  no S3, no S4   SM GR 3/6 MA  no rub, no thrill  carotid pulses normal; no bruit  pedal pulses normal  lower extremity edema: none    Skin:   warm, dry  Psychiatric:  judgement and insight appropriate  normal mood and affect        Result Review:  I have personally reviewed the available results from  [x]  Laboratory  [x]  EKG  [x]  Cardiology  [x]  Medications  [x]  Old records  []  Other:     Procedures  Lab Results   Component Value Date    CHOL 144 01/24/2023    CHOL 123 09/22/2022    CHOL 174 03/04/2022     Lab Results   Component Value Date    TRIG 80 01/24/2023    TRIG 104 09/22/2022    TRIG 181 (H) 03/04/2022     Lab Results   Component Value Date    HDL 48 01/24/2023    HDL 39 (L) 09/22/2022    HDL 37 (L) 03/04/2022     Lab Results   Component Value Date    LDL 80 01/24/2023    LDL 65 09/22/2022     (H) 03/04/2022     Lab Results   Component Value Date    VLDL 16 01/24/2023    VLDL 19 09/22/2022    VLDL 32 03/04/2022     Results for orders placed in visit on 08/01/22    Adult Transthoracic Echo Complete W/ Cont if Necessary Per Protocol    Interpretation Summary  Fibrocalcific mitral and aortic valves.  Normal left ventricular systolic function.  Moderate mitral regurgitation.  Moderate mitral stenosis.  Mild tricuspid regurgitation.     Impression/Plan:  1.  Chronic diastolic heart failure stable: Continue Lasix 40 mg once a day.  2.  Paroxysmal atrial fibrillation: Continue carvedilol 6.25 mg twice a  day.  Continue Eliquis 5 mg twice a day for stroke prevention.  3.  Moderate mitral stenosis/mitral regurgitation: Asymptomatic.  4.  Essential hypertension controlled: Continue carvedilol 6.25 mg twice a day.           Byron Lopez MD   01/30/24   14:26 EST

## 2024-01-30 ENCOUNTER — OFFICE VISIT (OUTPATIENT)
Dept: CARDIOLOGY | Facility: CLINIC | Age: 79
End: 2024-01-30
Payer: MEDICARE

## 2024-01-30 VITALS
DIASTOLIC BLOOD PRESSURE: 88 MMHG | BODY MASS INDEX: 35.51 KG/M2 | WEIGHT: 208 LBS | HEART RATE: 80 BPM | HEIGHT: 64 IN | SYSTOLIC BLOOD PRESSURE: 110 MMHG

## 2024-01-30 DIAGNOSIS — I34.0 NONRHEUMATIC MITRAL VALVE REGURGITATION: ICD-10-CM

## 2024-01-30 DIAGNOSIS — I10 HYPERTENSION, ESSENTIAL: ICD-10-CM

## 2024-01-30 DIAGNOSIS — I48.0 PAROXYSMAL ATRIAL FIBRILLATION: Primary | ICD-10-CM

## 2024-01-30 DIAGNOSIS — I50.32 DIASTOLIC CHF, CHRONIC: ICD-10-CM

## 2024-02-14 ENCOUNTER — LAB (OUTPATIENT)
Dept: INTERNAL MEDICINE | Facility: CLINIC | Age: 79
End: 2024-02-14
Payer: MEDICARE

## 2024-02-14 DIAGNOSIS — E78.2 MIXED HYPERLIPIDEMIA: ICD-10-CM

## 2024-02-14 DIAGNOSIS — I10 HYPERTENSION, ESSENTIAL: ICD-10-CM

## 2024-02-14 DIAGNOSIS — G93.32 CHRONIC FATIGUE SYNDROME: ICD-10-CM

## 2024-02-14 DIAGNOSIS — R73.01 IFG (IMPAIRED FASTING GLUCOSE): ICD-10-CM

## 2024-02-14 DIAGNOSIS — K21.9 GERD WITHOUT ESOPHAGITIS: ICD-10-CM

## 2024-02-14 DIAGNOSIS — M17.0 PRIMARY OSTEOARTHRITIS OF BOTH KNEES: ICD-10-CM

## 2024-02-14 DIAGNOSIS — E03.8 HYPOTHYROIDISM DUE TO HASHIMOTO'S THYROIDITIS: ICD-10-CM

## 2024-02-14 DIAGNOSIS — E06.3 HYPOTHYROIDISM DUE TO HASHIMOTO'S THYROIDITIS: ICD-10-CM

## 2024-02-14 DIAGNOSIS — E66.01 CLASS 3 SEVERE OBESITY DUE TO EXCESS CALORIES WITH SERIOUS COMORBIDITY AND BODY MASS INDEX (BMI) OF 40.0 TO 44.9 IN ADULT: ICD-10-CM

## 2024-02-14 DIAGNOSIS — I50.32 CHRONIC DIASTOLIC (CONGESTIVE) HEART FAILURE: ICD-10-CM

## 2024-02-14 DIAGNOSIS — I95.2 HYPOTENSION DUE TO DRUGS: ICD-10-CM

## 2024-02-14 DIAGNOSIS — I48.91 ATRIAL FIBRILLATION, UNSPECIFIED TYPE: ICD-10-CM

## 2024-02-14 DIAGNOSIS — E55.9 VITAMIN D DEFICIENCY: ICD-10-CM

## 2024-02-14 DIAGNOSIS — F41.1 ANXIETY, GENERALIZED: ICD-10-CM

## 2024-02-14 DIAGNOSIS — Z12.31 SCREENING MAMMOGRAM FOR BREAST CANCER: ICD-10-CM

## 2024-02-14 LAB
ALBUMIN SERPL-MCNC: 4.2 G/DL (ref 3.5–5.2)
ALBUMIN/GLOB SERPL: 1.4 G/DL
ALP SERPL-CCNC: 77 U/L (ref 39–117)
ALT SERPL W P-5'-P-CCNC: 7 U/L (ref 1–33)
ANION GAP SERPL CALCULATED.3IONS-SCNC: 12 MMOL/L (ref 5–15)
AST SERPL-CCNC: 20 U/L (ref 1–32)
BASOPHILS # BLD AUTO: 0.04 10*3/MM3 (ref 0–0.2)
BASOPHILS NFR BLD AUTO: 0.7 % (ref 0–1.5)
BILIRUB SERPL-MCNC: 0.8 MG/DL (ref 0–1.2)
BUN SERPL-MCNC: 23 MG/DL (ref 8–23)
BUN/CREAT SERPL: 20.4 (ref 7–25)
CALCIUM SPEC-SCNC: 9.5 MG/DL (ref 8.6–10.5)
CHLORIDE SERPL-SCNC: 104 MMOL/L (ref 98–107)
CHOLEST SERPL-MCNC: 141 MG/DL (ref 0–200)
CO2 SERPL-SCNC: 26 MMOL/L (ref 22–29)
CREAT SERPL-MCNC: 1.13 MG/DL (ref 0.57–1)
DEPRECATED RDW RBC AUTO: 45.9 FL (ref 37–54)
EGFRCR SERPLBLD CKD-EPI 2021: 49 ML/MIN/1.73
EOSINOPHIL # BLD AUTO: 0.15 10*3/MM3 (ref 0–0.4)
EOSINOPHIL NFR BLD AUTO: 2.6 % (ref 0.3–6.2)
ERYTHROCYTE [DISTWIDTH] IN BLOOD BY AUTOMATED COUNT: 12.9 % (ref 12.3–15.4)
GLOBULIN UR ELPH-MCNC: 2.9 GM/DL
GLUCOSE SERPL-MCNC: 95 MG/DL (ref 65–99)
HBA1C MFR BLD: 5.5 % (ref 4.8–5.6)
HCT VFR BLD AUTO: 40.1 % (ref 34–46.6)
HDLC SERPL-MCNC: 54 MG/DL (ref 40–60)
HGB BLD-MCNC: 13.3 G/DL (ref 12–15.9)
IMM GRANULOCYTES # BLD AUTO: 0.01 10*3/MM3 (ref 0–0.05)
IMM GRANULOCYTES NFR BLD AUTO: 0.2 % (ref 0–0.5)
LDLC SERPL CALC-MCNC: 73 MG/DL (ref 0–100)
LDLC/HDLC SERPL: 1.35 {RATIO}
LYMPHOCYTES # BLD AUTO: 1.52 10*3/MM3 (ref 0.7–3.1)
LYMPHOCYTES NFR BLD AUTO: 25.9 % (ref 19.6–45.3)
MCH RBC QN AUTO: 32 PG (ref 26.6–33)
MCHC RBC AUTO-ENTMCNC: 33.2 G/DL (ref 31.5–35.7)
MCV RBC AUTO: 96.6 FL (ref 79–97)
MONOCYTES # BLD AUTO: 0.56 10*3/MM3 (ref 0.1–0.9)
MONOCYTES NFR BLD AUTO: 9.5 % (ref 5–12)
NEUTROPHILS NFR BLD AUTO: 3.6 10*3/MM3 (ref 1.7–7)
NEUTROPHILS NFR BLD AUTO: 61.1 % (ref 42.7–76)
NRBC BLD AUTO-RTO: 0 /100 WBC (ref 0–0.2)
PLATELET # BLD AUTO: 181 10*3/MM3 (ref 140–450)
PMV BLD AUTO: 11.5 FL (ref 6–12)
POTASSIUM SERPL-SCNC: 4.7 MMOL/L (ref 3.5–5.2)
PROT SERPL-MCNC: 7.1 G/DL (ref 6–8.5)
RBC # BLD AUTO: 4.15 10*6/MM3 (ref 3.77–5.28)
SODIUM SERPL-SCNC: 142 MMOL/L (ref 136–145)
TRIGL SERPL-MCNC: 70 MG/DL (ref 0–150)
VLDLC SERPL-MCNC: 14 MG/DL (ref 5–40)
WBC NRBC COR # BLD AUTO: 5.88 10*3/MM3 (ref 3.4–10.8)

## 2024-02-14 PROCEDURE — 36415 COLL VENOUS BLD VENIPUNCTURE: CPT | Performed by: INTERNAL MEDICINE

## 2024-02-14 PROCEDURE — 85025 COMPLETE CBC W/AUTO DIFF WBC: CPT | Performed by: INTERNAL MEDICINE

## 2024-02-14 PROCEDURE — 83036 HEMOGLOBIN GLYCOSYLATED A1C: CPT | Performed by: INTERNAL MEDICINE

## 2024-02-14 PROCEDURE — 80053 COMPREHEN METABOLIC PANEL: CPT | Performed by: INTERNAL MEDICINE

## 2024-02-14 PROCEDURE — 80061 LIPID PANEL: CPT | Performed by: INTERNAL MEDICINE

## 2024-02-19 ENCOUNTER — OFFICE VISIT (OUTPATIENT)
Dept: INTERNAL MEDICINE | Facility: CLINIC | Age: 79
End: 2024-02-19
Payer: MEDICARE

## 2024-02-19 VITALS
TEMPERATURE: 98.4 F | SYSTOLIC BLOOD PRESSURE: 134 MMHG | DIASTOLIC BLOOD PRESSURE: 81 MMHG | WEIGHT: 208 LBS | OXYGEN SATURATION: 95 % | BODY MASS INDEX: 35.51 KG/M2 | HEIGHT: 64 IN | HEART RATE: 84 BPM

## 2024-02-19 DIAGNOSIS — I48.91 ATRIAL FIBRILLATION, UNSPECIFIED TYPE: ICD-10-CM

## 2024-02-19 DIAGNOSIS — K21.9 GERD WITHOUT ESOPHAGITIS: ICD-10-CM

## 2024-02-19 DIAGNOSIS — E78.2 MIXED HYPERLIPIDEMIA: ICD-10-CM

## 2024-02-19 DIAGNOSIS — I10 HYPERTENSION, ESSENTIAL: ICD-10-CM

## 2024-02-19 DIAGNOSIS — G89.29 CHRONIC LEFT-SIDED THORACIC BACK PAIN: ICD-10-CM

## 2024-02-19 DIAGNOSIS — E55.9 VITAMIN D DEFICIENCY: ICD-10-CM

## 2024-02-19 DIAGNOSIS — M54.6 CHRONIC LEFT-SIDED THORACIC BACK PAIN: ICD-10-CM

## 2024-02-19 DIAGNOSIS — E66.01 CLASS 3 SEVERE OBESITY DUE TO EXCESS CALORIES WITH SERIOUS COMORBIDITY AND BODY MASS INDEX (BMI) OF 40.0 TO 44.9 IN ADULT: ICD-10-CM

## 2024-02-19 DIAGNOSIS — F41.1 ANXIETY, GENERALIZED: ICD-10-CM

## 2024-02-19 DIAGNOSIS — G93.32 CHRONIC FATIGUE SYNDROME: ICD-10-CM

## 2024-02-19 DIAGNOSIS — M17.0 PRIMARY OSTEOARTHRITIS OF BOTH KNEES: ICD-10-CM

## 2024-02-19 DIAGNOSIS — Z00.00 MEDICARE ANNUAL WELLNESS VISIT, SUBSEQUENT: ICD-10-CM

## 2024-02-19 DIAGNOSIS — Z12.31 SCREENING MAMMOGRAM FOR BREAST CANCER: ICD-10-CM

## 2024-02-19 DIAGNOSIS — R73.01 IFG (IMPAIRED FASTING GLUCOSE): ICD-10-CM

## 2024-02-19 DIAGNOSIS — I50.32 CHRONIC DIASTOLIC (CONGESTIVE) HEART FAILURE: Primary | ICD-10-CM

## 2024-02-19 PROCEDURE — G0439 PPPS, SUBSEQ VISIT: HCPCS | Performed by: INTERNAL MEDICINE

## 2024-02-19 PROCEDURE — 99214 OFFICE O/P EST MOD 30 MIN: CPT | Performed by: INTERNAL MEDICINE

## 2024-02-19 PROCEDURE — 3075F SYST BP GE 130 - 139MM HG: CPT | Performed by: INTERNAL MEDICINE

## 2024-02-19 PROCEDURE — 3079F DIAST BP 80-89 MM HG: CPT | Performed by: INTERNAL MEDICINE

## 2024-02-19 NOTE — PROGRESS NOTES
"CHIEF COMPLAINT/ HPI: Patient is here for annual wellness checkup also, says she still having a lot of back pain says after she has been walking a little bit it gets better,  Hyperlipidemia, Hypothyroidism, Chronic Fatigue, Follow-up, and Medication Problem (Pt states that the furosemide is making her use the bathroom too often. She does not always make it to the toilet.)              Objective   Vital Signs  Vitals:    02/19/24 1055   BP: 134/81   BP Location: Right arm   Patient Position: Sitting   Cuff Size: Adult   Pulse: 84   Temp: 98.4 °F (36.9 °C)   TempSrc: Temporal   SpO2: 95%   Weight: 94.3 kg (208 lb)   Height: 162.6 cm (64\")      Body mass index is 35.7 kg/m².  Review of Systems   Constitutional: Negative.    HENT: Negative.     Eyes: Negative.    Respiratory: Negative.     Cardiovascular: Negative.    Gastrointestinal: Negative.    Endocrine: Negative.    Genitourinary: Negative.    Musculoskeletal:  Positive for back pain.   Allergic/Immunologic: Negative.    Neurological: Negative.    Hematological: Negative.    Psychiatric/Behavioral: Negative.        Physical Exam  Constitutional:       General: She is not in acute distress.     Appearance: Normal appearance. She is obese.   HENT:      Head: Normocephalic.      Mouth/Throat:      Mouth: Mucous membranes are moist.   Eyes:      Conjunctiva/sclera: Conjunctivae normal.      Pupils: Pupils are equal, round, and reactive to light.   Cardiovascular:      Rate and Rhythm: Normal rate and regular rhythm.      Pulses: Normal pulses.      Heart sounds: Normal heart sounds.   Pulmonary:      Effort: Pulmonary effort is normal.      Breath sounds: Normal breath sounds.   Abdominal:      General: Bowel sounds are normal.      Palpations: Abdomen is soft.   Musculoskeletal:         General: Tenderness (Left low back area,) present. No swelling. Normal range of motion.      Cervical back: Neck supple.   Skin:     General: Skin is warm and dry.      Coloration: " Skin is not jaundiced.   Neurological:      General: No focal deficit present.      Mental Status: She is alert and oriented to person, place, and time. Mental status is at baseline.   Psychiatric:         Mood and Affect: Mood normal.         Behavior: Behavior normal.         Thought Content: Thought content normal.         Judgment: Judgment normal.        Result Review :   Lab Results   Component Value Date    PROBNP 1,683.0 08/10/2023    PROBNP 1,691.0 01/24/2023    PROBNP 1,570.0 09/22/2022     CMP          8/10/2023    09:47 2/14/2024    09:33   CMP   Glucose 90  95    BUN 18  23    Creatinine 0.99  1.13    EGFR 57.8  49.0    Sodium 142  142    Potassium 4.4  4.7    Chloride 103  104    Calcium 9.9  9.5    Total Protein 6.7  7.1    Albumin 4.3  4.2    Globulin 2.4  2.9    Total Bilirubin 0.7  0.8    Alkaline Phosphatase 68  77    AST (SGOT) 21  20    ALT (SGPT) 10  7    Albumin/Globulin Ratio 1.8  1.4    BUN/Creatinine Ratio 18.2  20.4    Anion Gap 10.6  12.0      CBC w/diff          8/10/2023    09:47 2/14/2024    09:33   CBC w/Diff   WBC 5.18  5.88    RBC 3.94  4.15    Hemoglobin 12.5  13.3    Hematocrit 37.3  40.1    MCV 94.7  96.6    MCH 31.7  32.0    MCHC 33.5  33.2    RDW 12.7  12.9    Platelets 167  181    Neutrophil Rel % 57.7  61.1    Immature Granulocyte Rel % 0.2  0.2    Lymphocyte Rel % 29.5  25.9    Monocyte Rel % 9.3  9.5    Eosinophil Rel % 2.7  2.6    Basophil Rel % 0.6  0.7       Lipid Panel          2/14/2024    09:33   Lipid Panel   Total Cholesterol 141    Triglycerides 70    HDL Cholesterol 54    VLDL Cholesterol 14    LDL Cholesterol  73    LDL/HDL Ratio 1.35       Lab Results   Component Value Date    TSH 3.060 01/24/2023    TSH 2.980 09/22/2022    TSH 2.500 03/04/2022      Lab Results   Component Value Date    FREET4 1.60 01/24/2023    FREET4 1.51 09/22/2022    FREET4 1.68 03/04/2022      A1C Last 3 Results          2/14/2024    09:33   HGBA1C Last 3 Results   Hemoglobin A1C 5.50                         Visit Diagnoses:    ICD-10-CM ICD-9-CM   1. Chronic diastolic (congestive) heart failure  I50.32 428.32     428.0   2. Medicare annual wellness visit, subsequent  Z00.00 V70.0   3. Mixed hyperlipidemia  E78.2 272.2   4. Atrial fibrillation, unspecified type  I48.91 427.31   5. GERD without esophagitis  K21.9 530.81   6. Anxiety, generalized  F41.1 300.02   7. Screening mammogram for breast cancer  Z12.31 V76.12   8. Chronic fatigue syndrome  G93.32 780.71   9. Primary osteoarthritis of both knees  M17.0 715.16   10. IFG (impaired fasting glucose)  R73.01 790.21   11. Class 3 severe obesity due to excess calories with serious comorbidity and body mass index (BMI) of 40.0 to 44.9 in adult  E66.01 278.01    Z68.41 V85.41   12. Vitamin D deficiency  E55.9 268.9   13. Hypertension, essential  I10 401.9   14. Chronic left-sided thoracic back pain  M54.6 724.1    G89.29 338.29       Assessment and Plan   Diagnoses and all orders for this visit:    1. Chronic diastolic (congestive) heart failure (Primary)  -     Comprehensive Metabolic Panel; Future  -     CBC & Differential; Future  -     Hemoglobin A1c; Future  -     Lipid Panel; Future  -     TSH+Free T4; Future  -     proBNP; Future    2. Medicare annual wellness visit, subsequent  -     Comprehensive Metabolic Panel; Future  -     CBC & Differential; Future  -     Hemoglobin A1c; Future  -     Lipid Panel; Future  -     TSH+Free T4; Future  -     proBNP; Future    3. Mixed hyperlipidemia  -     Comprehensive Metabolic Panel; Future  -     CBC & Differential; Future  -     Hemoglobin A1c; Future  -     Lipid Panel; Future  -     TSH+Free T4; Future  -     proBNP; Future    4. Atrial fibrillation, unspecified type  -     Comprehensive Metabolic Panel; Future  -     CBC & Differential; Future  -     Hemoglobin A1c; Future  -     Lipid Panel; Future  -     TSH+Free T4; Future  -     proBNP; Future    5. GERD without esophagitis  -     Comprehensive  Metabolic Panel; Future  -     CBC & Differential; Future  -     Hemoglobin A1c; Future  -     Lipid Panel; Future  -     TSH+Free T4; Future  -     proBNP; Future    6. Anxiety, generalized  -     Comprehensive Metabolic Panel; Future  -     CBC & Differential; Future  -     Hemoglobin A1c; Future  -     Lipid Panel; Future  -     TSH+Free T4; Future  -     proBNP; Future    7. Screening mammogram for breast cancer  -     Comprehensive Metabolic Panel; Future  -     CBC & Differential; Future  -     Hemoglobin A1c; Future  -     Lipid Panel; Future  -     TSH+Free T4; Future  -     proBNP; Future    8. Chronic fatigue syndrome  -     Comprehensive Metabolic Panel; Future  -     CBC & Differential; Future  -     Hemoglobin A1c; Future  -     Lipid Panel; Future  -     TSH+Free T4; Future  -     proBNP; Future    9. Primary osteoarthritis of both knees  -     Comprehensive Metabolic Panel; Future  -     CBC & Differential; Future  -     Hemoglobin A1c; Future  -     Lipid Panel; Future  -     TSH+Free T4; Future  -     proBNP; Future    10. IFG (impaired fasting glucose)  -     Comprehensive Metabolic Panel; Future  -     CBC & Differential; Future  -     Hemoglobin A1c; Future  -     Lipid Panel; Future  -     TSH+Free T4; Future  -     proBNP; Future    11. Class 3 severe obesity due to excess calories with serious comorbidity and body mass index (BMI) of 40.0 to 44.9 in adult  -     Comprehensive Metabolic Panel; Future  -     CBC & Differential; Future  -     Hemoglobin A1c; Future  -     Lipid Panel; Future  -     TSH+Free T4; Future  -     proBNP; Future    12. Vitamin D deficiency  -     Comprehensive Metabolic Panel; Future  -     CBC & Differential; Future  -     Hemoglobin A1c; Future  -     Lipid Panel; Future  -     TSH+Free T4; Future  -     proBNP; Future    13. Hypertension, essential  -     Comprehensive Metabolic Panel; Future  -     CBC & Differential; Future  -     Hemoglobin A1c; Future  -      Lipid Panel; Future  -     TSH+Free T4; Future  -     proBNP; Future    14. Chronic left-sided thoracic back pain  -     Comprehensive Metabolic Panel; Future  -     CBC & Differential; Future  -     Hemoglobin A1c; Future  -     Lipid Panel; Future  -     TSH+Free T4; Future  -     proBNP; Future    Other orders  -     Misc. Devices (Bariatric Rollator) misc; Use 1 each 4 (Four) Times a Day.  Dispense: 1 each; Refill: 1    Annual wellness visit completed February 19, 2024,    Skin lesion, face, will refer to dermatology--did see and has follow-up again,    Midthoracic back pain--left-sided,- continues tramadol as needed every 6 as needed, continues, discussed physical therapy again pain management, February 2024    cKD, stage IIIa, creatinine is improved stable at 1.1 February 2024    Chronic diastolic CHF.  Current BNP level 1683 August 2023, stable improved ---------------echo on 2/5/2021 which revealed EF 55 to 60%.  with mild MR as well, study was technically difficult. At that time there was not any significant LVH noted on that echo either. Most likely related to new onset A. Fib = tachycardia induced cardiomyopathy,   --> Echo came back with EF 35 to 40% with moderate global hypokinesis.,  Technically difficult study, moderately dilated left atrium and right atrium moderate mitral annular calcification with thickened leaflets and mild to moderate mitral stenosis minimal prolapse of the anterior mitral leaflet mild aortic sclerosis with trivial aortic regurgitation mild right ventricular pressure elevations at 44 mmHg dilated IVC consistent with elevated central venous pressures moderate size left pleural effusion trivial pericardial effusion------  defer to cardiology on this.  She is diuresing, so we will continue Lasix 40 mg twice daily, potassium pills  2 tabs twice a day ,  Eliquis 5 mg twice daily,  carvedilol 6.25 mg twice a day -------- BR NP level stable at 1691------------------ echocardiogram  August 1, 2022 shows fibrocalcific mitral and aortic valves normal LV function moderate mitral and moderate mitral stenosis with regurgitation mild tricuspid regurgitation    atrial fibrillation.  -continue carvedilol, 6.25 twice daily,  Eliquis 5 mg twice daily,      hypertension.  CONT  carvedilol,, 6.25 mg twice daily, Lasix 40 mg twice a day diuretics,, potassium 2 tablets twice a day---     Bilateral pleural effusions. CT chest December 10, 2021, shows bilateral pleural effusions, compressive atelectasis and pulmonary edema----, x-ray December 20 , 2021---showed small pleural effusion ------ aggressive diuretic regimen-----------------, repeat CT scan December 17, 2021 shows no change moderate pulmonary edema bilateral pleural effusions of moderate degree most likely cardiac in nature    Impaired fasting glucose, hemoglobin A1c, 5.5, February 2024    Insomnia, continues  temazepam 15 mg nightly as needed    Left total knee arthroplasty January 2014, [facility], right total knee arthroplasty 2013    Vitamin D deficiency continues on replacement therapy,    Cologuard testing negative, January 2020    Breast cancer left mastectomy January 2011--- benign mammogram May 2023 right sided    Abdominal pains--- colonoscopy July 2021 no obvious lesions, Dr. Perez              Follow Up   Return in about 6 months (around 8/19/2024).  Patient was given instructions and counseling regarding her condition or for health maintenance advice. Please see specific information pulled into the AVS if appropriate.

## 2024-02-19 NOTE — PROGRESS NOTES
The ABCs of the Annual Wellness Visit  Subsequent Medicare Wellness Visit    Subjective      Bella Brandt is a 79 y.o. female who presents for a Subsequent Medicare Wellness Visit.    The following portions of the patient's history were reviewed and   updated as appropriate: allergies, current medications, past family history, past medical history, past social history, past surgical history, and problem list.    Compared to one year ago, the patient feels her physical   health is better.    Compared to one year ago, the patient feels her mental   health is better.    Recent Hospitalizations:  She was not admitted to the hospital during the last year.       Current Medical Providers:  Patient Care Team:  Taiwo Nazario MD as PCP - General (Internal Medicine)    Outpatient Medications Prior to Visit   Medication Sig Dispense Refill    apixaban (Eliquis) 5 MG tablet tablet Take 1 tablet by mouth Every 12 (Twelve) Hours. 180 tablet 3    carvedilol (COREG) 6.25 MG tablet TAKE 1 TABLET BY MOUTH TWICE DAILY WITH MEALS 180 tablet 1    furosemide (LASIX) 40 MG tablet TAKE 1 TABLET BY MOUTH TWICE DAILY 60 tablet 5    Magnesium 500 MG capsule Take 500 mg by mouth Every Other Day.      polyethylene glycol (MIRALAX) 17 g packet Take 17 g by mouth Daily As Needed (Use if senna-docusate is ineffective). 30 packet 5    potassium chloride (MICRO-K) 10 MEQ CR capsule TAKE 2 CAPSULES BY MOUTH TWICE DAILY WITH MEALS 180 capsule 3    pantoprazole (PROTONIX) 40 MG EC tablet TAKE 1 TABLET BY MOUTH DAILY (Patient not taking: Reported on 2/19/2024) 30 tablet 5     No facility-administered medications prior to visit.       No opioid medication identified on active medication list. I have reviewed chart for other potential  high risk medication/s and harmful drug interactions in the elderly.        Aspirin is not on active medication list.  Aspirin use is contraindicated for this patient due to: current use of Eliquis.   ".    Patient Active Problem List   Diagnosis    Hypertension, essential    Vitamin D deficiency    Hypothyroidism    GERD without esophagitis    Depression    Anxiety, generalized    IFG (impaired fasting glucose)    Screening mammogram for breast cancer    Hematochezia    Atrial fibrillation    Shortness of breath on exertion    Acute systolic congestive heart failure    Mixed hyperlipidemia    Class 3 severe obesity due to excess calories with serious comorbidity and body mass index (BMI) of 40.0 to 44.9 in adult    Primary osteoarthritis of both knees    Chronic fatigue syndrome    Seasonal allergic rhinitis    Hypotension due to drugs    Chronic diastolic (congestive) heart failure    Medicare annual wellness visit, subsequent    Chronic left-sided thoracic back pain     Advance Care Planning   Advance Care Planning     Advance Directive is on file.  ACP discussion was held with the patient during this visit. Patient has an advance directive in EMR which is still valid.      Objective    Vitals:    24 1055   BP: 134/81   BP Location: Right arm   Patient Position: Sitting   Cuff Size: Adult   Pulse: 84   Temp: 98.4 °F (36.9 °C)   TempSrc: Temporal   SpO2: 95%   Weight: 94.3 kg (208 lb)   Height: 162.6 cm (64\")     Estimated body mass index is 35.7 kg/m² as calculated from the following:    Height as of this encounter: 162.6 cm (64\").    Weight as of this encounter: 94.3 kg (208 lb).           Does the patient have evidence of cognitive impairment?   No    Lab Results   Component Value Date    TRIG 70 2024    HDL 54 2024    LDL 73 2024    VLDL 14 2024    HGBA1C 5.50 2024          HEALTH RISK ASSESSMENT    Smoking Status:  Social History     Tobacco Use   Smoking Status Former    Packs/day: 0.50    Years: 10.00    Additional pack years: 0.00    Total pack years: 5.00    Types: Cigarettes    Quit date:     Years since quittin.1   Smokeless Tobacco Never     Alcohol " Consumption:  Social History     Substance and Sexual Activity   Alcohol Use Not Currently     Fall Risk Screen:    YONY Fall Risk Assessment was completed, and patient is at LOW risk for falls.Assessment completed on:2024    Depression Screenin/19/2024    10:56 AM   PHQ-2/PHQ-9 Depression Screening   Little Interest or Pleasure in Doing Things 0-->not at all   Feeling Down, Depressed or Hopeless 0-->not at all   PHQ-9: Brief Depression Severity Measure Score 0       Health Habits and Functional and Cognitive Screening:       No data to display                Age-appropriate Screening Schedule:  Refer to the list below for future screening recommendations based on patient's age, sex and/or medical conditions. Orders for these recommended tests are listed in the plan section. The patient has been provided with a written plan.    Health Maintenance   Topic Date Due    DXA SCAN  Never done    TDAP/TD VACCINES (1 - Tdap) Never done    ZOSTER VACCINE (2 of 2) 2018    COLORECTAL CANCER SCREENING  2020    BMI FOLLOWUP  2024    LIPID PANEL  2025    ANNUAL WELLNESS VISIT  2025    COVID-19 Vaccine  Completed    RSV Vaccine - Adults  Completed    INFLUENZA VACCINE  Completed    Pneumococcal Vaccine 65+  Completed                  CMS Preventative Services Quick Reference  Risk Factors Identified During Encounter:    Inactivity/Sedentary: Patient was advised to exercise at least 150 minutes a week per CDC recommendations.    The above risks/problems have been discussed with the patient.  Pertinent information has been shared with the patient in the After Visit Summary.    Diagnoses and all orders for this visit:    1. Chronic diastolic (congestive) heart failure (Primary)    2. Medicare annual wellness visit, subsequent    3. Mixed hyperlipidemia    4. Atrial fibrillation, unspecified type    5. GERD without esophagitis    6. Anxiety, generalized    7. Screening mammogram for breast  cancer    8. Chronic fatigue syndrome    9. Primary osteoarthritis of both knees    10. IFG (impaired fasting glucose)    11. Class 3 severe obesity due to excess calories with serious comorbidity and body mass index (BMI) of 40.0 to 44.9 in adult    12. Vitamin D deficiency    13. Hypertension, essential    14. Chronic left-sided thoracic back pain    Other orders  -     Misc. Devices (Bariatric Rollator) misc; Use 1 each 4 (Four) Times a Day.  Dispense: 1 each; Refill: 1        Follow Up:   Next Medicare Wellness visit to be scheduled in 1 year.      An After Visit Summary and PPPS were made available to the patient.

## 2024-02-21 RX ORDER — FUROSEMIDE 40 MG/1
TABLET ORAL
Qty: 60 TABLET | Refills: 5 | Status: SHIPPED | OUTPATIENT
Start: 2024-02-21

## 2024-02-21 RX ORDER — POTASSIUM CHLORIDE 750 MG/1
CAPSULE, EXTENDED RELEASE ORAL
Qty: 180 CAPSULE | Refills: 3 | Status: SHIPPED | OUTPATIENT
Start: 2024-02-21

## 2024-02-23 ENCOUNTER — TRANSCRIBE ORDERS (OUTPATIENT)
Dept: ADMINISTRATIVE | Facility: HOSPITAL | Age: 79
End: 2024-02-23
Payer: MEDICARE

## 2024-02-23 DIAGNOSIS — Z12.31 VISIT FOR SCREENING MAMMOGRAM: Primary | ICD-10-CM

## 2024-02-26 DIAGNOSIS — F32.0 CURRENT MILD EPISODE OF MAJOR DEPRESSIVE DISORDER, UNSPECIFIED WHETHER RECURRENT: ICD-10-CM

## 2024-02-26 DIAGNOSIS — M17.0 PRIMARY OSTEOARTHRITIS OF BOTH KNEES: ICD-10-CM

## 2024-02-26 DIAGNOSIS — E55.9 VITAMIN D DEFICIENCY: ICD-10-CM

## 2024-02-26 DIAGNOSIS — I50.21 ACUTE SYSTOLIC CONGESTIVE HEART FAILURE: ICD-10-CM

## 2024-02-26 DIAGNOSIS — E66.01 CLASS 3 SEVERE OBESITY DUE TO EXCESS CALORIES WITH SERIOUS COMORBIDITY AND BODY MASS INDEX (BMI) OF 40.0 TO 44.9 IN ADULT: ICD-10-CM

## 2024-02-26 DIAGNOSIS — F41.1 ANXIETY, GENERALIZED: ICD-10-CM

## 2024-02-26 DIAGNOSIS — E78.2 MIXED HYPERLIPIDEMIA: ICD-10-CM

## 2024-02-26 DIAGNOSIS — R06.02 SHORTNESS OF BREATH ON EXERTION: ICD-10-CM

## 2024-02-26 DIAGNOSIS — I48.91 ATRIAL FIBRILLATION, UNSPECIFIED TYPE: ICD-10-CM

## 2024-02-26 DIAGNOSIS — R73.01 IFG (IMPAIRED FASTING GLUCOSE): ICD-10-CM

## 2024-02-26 DIAGNOSIS — I10 HYPERTENSION, ESSENTIAL: ICD-10-CM

## 2024-02-26 RX ORDER — TRAMADOL HYDROCHLORIDE 50 MG/1
50 TABLET ORAL EVERY 6 HOURS PRN
Qty: 90 TABLET | Refills: 2 | Status: SHIPPED | OUTPATIENT
Start: 2024-02-26

## 2024-04-22 RX ORDER — APIXABAN 5 MG/1
5 TABLET, FILM COATED ORAL EVERY 12 HOURS
Qty: 180 TABLET | Refills: 3 | Status: SHIPPED | OUTPATIENT
Start: 2024-04-22

## 2024-04-22 RX ORDER — CARVEDILOL 6.25 MG/1
6.25 TABLET ORAL 2 TIMES DAILY WITH MEALS
Qty: 180 TABLET | Refills: 1 | Status: SHIPPED | OUTPATIENT
Start: 2024-04-22

## 2024-05-31 ENCOUNTER — HOSPITAL ENCOUNTER (OUTPATIENT)
Dept: MAMMOGRAPHY | Facility: HOSPITAL | Age: 79
Discharge: HOME | End: 2024-05-31
Admitting: INTERNAL MEDICINE
Payer: MEDICARE

## 2024-05-31 DIAGNOSIS — Z12.31 VISIT FOR SCREENING MAMMOGRAM: ICD-10-CM

## 2024-05-31 PROCEDURE — 77063 BREAST TOMOSYNTHESIS BI: CPT

## 2024-05-31 PROCEDURE — 77067 SCR MAMMO BI INCL CAD: CPT

## 2024-08-01 DIAGNOSIS — M17.0 PRIMARY OSTEOARTHRITIS OF BOTH KNEES: ICD-10-CM

## 2024-08-01 DIAGNOSIS — E66.01 CLASS 3 SEVERE OBESITY DUE TO EXCESS CALORIES WITH SERIOUS COMORBIDITY AND BODY MASS INDEX (BMI) OF 40.0 TO 44.9 IN ADULT: ICD-10-CM

## 2024-08-01 DIAGNOSIS — R73.01 IFG (IMPAIRED FASTING GLUCOSE): ICD-10-CM

## 2024-08-01 DIAGNOSIS — I50.21 ACUTE SYSTOLIC CONGESTIVE HEART FAILURE: ICD-10-CM

## 2024-08-01 DIAGNOSIS — E55.9 VITAMIN D DEFICIENCY: ICD-10-CM

## 2024-08-01 DIAGNOSIS — F32.0 CURRENT MILD EPISODE OF MAJOR DEPRESSIVE DISORDER, UNSPECIFIED WHETHER RECURRENT: ICD-10-CM

## 2024-08-01 DIAGNOSIS — F41.1 ANXIETY, GENERALIZED: ICD-10-CM

## 2024-08-01 DIAGNOSIS — I10 HYPERTENSION, ESSENTIAL: ICD-10-CM

## 2024-08-01 DIAGNOSIS — I48.91 ATRIAL FIBRILLATION, UNSPECIFIED TYPE: ICD-10-CM

## 2024-08-01 DIAGNOSIS — R06.02 SHORTNESS OF BREATH ON EXERTION: ICD-10-CM

## 2024-08-01 DIAGNOSIS — E78.2 MIXED HYPERLIPIDEMIA: ICD-10-CM

## 2024-08-01 RX ORDER — TRAMADOL HYDROCHLORIDE 50 MG/1
50 TABLET ORAL EVERY 6 HOURS PRN
Qty: 90 TABLET | Refills: 5 | Status: SHIPPED | OUTPATIENT
Start: 2024-08-01

## 2024-08-19 ENCOUNTER — LAB (OUTPATIENT)
Dept: INTERNAL MEDICINE | Age: 79
End: 2024-08-19
Payer: MEDICARE

## 2024-08-19 DIAGNOSIS — R73.01 IFG (IMPAIRED FASTING GLUCOSE): ICD-10-CM

## 2024-08-19 DIAGNOSIS — I48.91 ATRIAL FIBRILLATION, UNSPECIFIED TYPE: ICD-10-CM

## 2024-08-19 DIAGNOSIS — M54.6 CHRONIC LEFT-SIDED THORACIC BACK PAIN: ICD-10-CM

## 2024-08-19 DIAGNOSIS — Z12.31 SCREENING MAMMOGRAM FOR BREAST CANCER: ICD-10-CM

## 2024-08-19 DIAGNOSIS — Z00.00 MEDICARE ANNUAL WELLNESS VISIT, SUBSEQUENT: ICD-10-CM

## 2024-08-19 DIAGNOSIS — K21.9 GERD WITHOUT ESOPHAGITIS: ICD-10-CM

## 2024-08-19 DIAGNOSIS — I10 HYPERTENSION, ESSENTIAL: ICD-10-CM

## 2024-08-19 DIAGNOSIS — E78.2 MIXED HYPERLIPIDEMIA: ICD-10-CM

## 2024-08-19 DIAGNOSIS — E66.01 CLASS 3 SEVERE OBESITY DUE TO EXCESS CALORIES WITH SERIOUS COMORBIDITY AND BODY MASS INDEX (BMI) OF 40.0 TO 44.9 IN ADULT: ICD-10-CM

## 2024-08-19 DIAGNOSIS — G93.32 CHRONIC FATIGUE SYNDROME: ICD-10-CM

## 2024-08-19 DIAGNOSIS — M17.0 PRIMARY OSTEOARTHRITIS OF BOTH KNEES: ICD-10-CM

## 2024-08-19 DIAGNOSIS — E55.9 VITAMIN D DEFICIENCY: ICD-10-CM

## 2024-08-19 DIAGNOSIS — I50.32 CHRONIC DIASTOLIC (CONGESTIVE) HEART FAILURE: ICD-10-CM

## 2024-08-19 DIAGNOSIS — G89.29 CHRONIC LEFT-SIDED THORACIC BACK PAIN: ICD-10-CM

## 2024-08-19 DIAGNOSIS — F41.1 ANXIETY, GENERALIZED: ICD-10-CM

## 2024-08-19 LAB
ALBUMIN SERPL-MCNC: 4.1 G/DL (ref 3.5–5.2)
ALBUMIN/GLOB SERPL: 1.4 G/DL
ALP SERPL-CCNC: 72 U/L (ref 39–117)
ALT SERPL W P-5'-P-CCNC: 11 U/L (ref 1–33)
ANION GAP SERPL CALCULATED.3IONS-SCNC: 10.3 MMOL/L (ref 5–15)
AST SERPL-CCNC: 19 U/L (ref 1–32)
BASOPHILS # BLD AUTO: 0.04 10*3/MM3 (ref 0–0.2)
BASOPHILS NFR BLD AUTO: 0.6 % (ref 0–1.5)
BILIRUB SERPL-MCNC: 1.1 MG/DL (ref 0–1.2)
BUN SERPL-MCNC: 21 MG/DL (ref 8–23)
BUN/CREAT SERPL: 21.2 (ref 7–25)
CALCIUM SPEC-SCNC: 9.7 MG/DL (ref 8.6–10.5)
CHLORIDE SERPL-SCNC: 104 MMOL/L (ref 98–107)
CHOLEST SERPL-MCNC: 146 MG/DL (ref 0–200)
CO2 SERPL-SCNC: 27.7 MMOL/L (ref 22–29)
CREAT SERPL-MCNC: 0.99 MG/DL (ref 0.57–1)
DEPRECATED RDW RBC AUTO: 44.4 FL (ref 37–54)
EGFRCR SERPLBLD CKD-EPI 2021: 57.4 ML/MIN/1.73
EOSINOPHIL # BLD AUTO: 0.17 10*3/MM3 (ref 0–0.4)
EOSINOPHIL NFR BLD AUTO: 2.7 % (ref 0.3–6.2)
ERYTHROCYTE [DISTWIDTH] IN BLOOD BY AUTOMATED COUNT: 12.5 % (ref 12.3–15.4)
GLOBULIN UR ELPH-MCNC: 2.9 GM/DL
GLUCOSE SERPL-MCNC: 94 MG/DL (ref 65–99)
HBA1C MFR BLD: 5.2 % (ref 4.8–5.6)
HCT VFR BLD AUTO: 40.1 % (ref 34–46.6)
HDLC SERPL-MCNC: 54 MG/DL (ref 40–60)
HGB BLD-MCNC: 13.5 G/DL (ref 12–15.9)
IMM GRANULOCYTES # BLD AUTO: 0.02 10*3/MM3 (ref 0–0.05)
IMM GRANULOCYTES NFR BLD AUTO: 0.3 % (ref 0–0.5)
LDLC SERPL CALC-MCNC: 76 MG/DL (ref 0–100)
LDLC/HDLC SERPL: 1.4 {RATIO}
LYMPHOCYTES # BLD AUTO: 1.74 10*3/MM3 (ref 0.7–3.1)
LYMPHOCYTES NFR BLD AUTO: 28.1 % (ref 19.6–45.3)
MCH RBC QN AUTO: 32.4 PG (ref 26.6–33)
MCHC RBC AUTO-ENTMCNC: 33.7 G/DL (ref 31.5–35.7)
MCV RBC AUTO: 96.2 FL (ref 79–97)
MONOCYTES # BLD AUTO: 0.62 10*3/MM3 (ref 0.1–0.9)
MONOCYTES NFR BLD AUTO: 10 % (ref 5–12)
NEUTROPHILS NFR BLD AUTO: 3.6 10*3/MM3 (ref 1.7–7)
NEUTROPHILS NFR BLD AUTO: 58.3 % (ref 42.7–76)
NRBC BLD AUTO-RTO: 0 /100 WBC (ref 0–0.2)
NT-PROBNP SERPL-MCNC: 1589 PG/ML (ref 0–1800)
PLATELET # BLD AUTO: 165 10*3/MM3 (ref 140–450)
PMV BLD AUTO: 11.5 FL (ref 6–12)
POTASSIUM SERPL-SCNC: 4.3 MMOL/L (ref 3.5–5.2)
PROT SERPL-MCNC: 7 G/DL (ref 6–8.5)
RBC # BLD AUTO: 4.17 10*6/MM3 (ref 3.77–5.28)
SODIUM SERPL-SCNC: 142 MMOL/L (ref 136–145)
T4 FREE SERPL-MCNC: 1.4 NG/DL (ref 0.92–1.68)
TRIGL SERPL-MCNC: 83 MG/DL (ref 0–150)
TSH SERPL DL<=0.05 MIU/L-ACNC: 2.61 UIU/ML (ref 0.27–4.2)
VLDLC SERPL-MCNC: 16 MG/DL (ref 5–40)
WBC NRBC COR # BLD AUTO: 6.19 10*3/MM3 (ref 3.4–10.8)

## 2024-08-19 PROCEDURE — 84439 ASSAY OF FREE THYROXINE: CPT | Performed by: INTERNAL MEDICINE

## 2024-08-19 PROCEDURE — 85025 COMPLETE CBC W/AUTO DIFF WBC: CPT | Performed by: INTERNAL MEDICINE

## 2024-08-19 PROCEDURE — 80053 COMPREHEN METABOLIC PANEL: CPT | Performed by: INTERNAL MEDICINE

## 2024-08-19 PROCEDURE — 84443 ASSAY THYROID STIM HORMONE: CPT | Performed by: INTERNAL MEDICINE

## 2024-08-19 PROCEDURE — 83880 ASSAY OF NATRIURETIC PEPTIDE: CPT | Performed by: INTERNAL MEDICINE

## 2024-08-19 PROCEDURE — 80061 LIPID PANEL: CPT | Performed by: INTERNAL MEDICINE

## 2024-08-19 PROCEDURE — 36415 COLL VENOUS BLD VENIPUNCTURE: CPT | Performed by: INTERNAL MEDICINE

## 2024-08-19 PROCEDURE — 83036 HEMOGLOBIN GLYCOSYLATED A1C: CPT | Performed by: INTERNAL MEDICINE

## 2024-08-19 RX ORDER — POTASSIUM CHLORIDE 750 MG/1
CAPSULE, EXTENDED RELEASE ORAL
Qty: 180 CAPSULE | Refills: 3 | OUTPATIENT
Start: 2024-08-19

## 2024-08-19 RX ORDER — POTASSIUM CHLORIDE 750 MG/1
CAPSULE, EXTENDED RELEASE ORAL
Qty: 180 CAPSULE | Refills: 3 | Status: SHIPPED | OUTPATIENT
Start: 2024-08-19

## 2024-08-19 RX ORDER — FUROSEMIDE 40 MG/1
TABLET ORAL
Qty: 60 TABLET | Refills: 5 | Status: SHIPPED | OUTPATIENT
Start: 2024-08-19

## 2024-08-23 ENCOUNTER — OFFICE VISIT (OUTPATIENT)
Dept: INTERNAL MEDICINE | Age: 79
End: 2024-08-23
Payer: MEDICARE

## 2024-08-23 VITALS
HEIGHT: 64 IN | SYSTOLIC BLOOD PRESSURE: 105 MMHG | OXYGEN SATURATION: 96 % | DIASTOLIC BLOOD PRESSURE: 61 MMHG | TEMPERATURE: 96.9 F | HEART RATE: 84 BPM | WEIGHT: 210 LBS | BODY MASS INDEX: 35.85 KG/M2

## 2024-08-23 DIAGNOSIS — Z12.31 SCREENING MAMMOGRAM FOR BREAST CANCER: ICD-10-CM

## 2024-08-23 DIAGNOSIS — F32.0 CURRENT MILD EPISODE OF MAJOR DEPRESSIVE DISORDER, UNSPECIFIED WHETHER RECURRENT: Primary | ICD-10-CM

## 2024-08-23 DIAGNOSIS — R73.01 IFG (IMPAIRED FASTING GLUCOSE): ICD-10-CM

## 2024-08-23 DIAGNOSIS — J30.1 SEASONAL ALLERGIC RHINITIS DUE TO POLLEN: ICD-10-CM

## 2024-08-23 DIAGNOSIS — I50.32 CHRONIC DIASTOLIC (CONGESTIVE) HEART FAILURE: ICD-10-CM

## 2024-08-23 DIAGNOSIS — I10 HYPERTENSION, ESSENTIAL: ICD-10-CM

## 2024-08-23 DIAGNOSIS — E78.2 MIXED HYPERLIPIDEMIA: ICD-10-CM

## 2024-08-23 DIAGNOSIS — F41.1 ANXIETY, GENERALIZED: ICD-10-CM

## 2024-08-23 DIAGNOSIS — E55.9 VITAMIN D DEFICIENCY: ICD-10-CM

## 2024-08-23 DIAGNOSIS — K21.9 GERD WITHOUT ESOPHAGITIS: ICD-10-CM

## 2024-08-23 DIAGNOSIS — M17.0 PRIMARY OSTEOARTHRITIS OF BOTH KNEES: ICD-10-CM

## 2024-08-23 DIAGNOSIS — E66.01 CLASS 3 SEVERE OBESITY DUE TO EXCESS CALORIES WITH SERIOUS COMORBIDITY AND BODY MASS INDEX (BMI) OF 40.0 TO 44.9 IN ADULT: ICD-10-CM

## 2024-08-23 DIAGNOSIS — E03.8 HYPOTHYROIDISM DUE TO HASHIMOTO'S THYROIDITIS: ICD-10-CM

## 2024-08-23 DIAGNOSIS — G93.32 CHRONIC FATIGUE SYNDROME: ICD-10-CM

## 2024-08-23 DIAGNOSIS — E06.3 HYPOTHYROIDISM DUE TO HASHIMOTO'S THYROIDITIS: ICD-10-CM

## 2024-08-23 DIAGNOSIS — I48.91 ATRIAL FIBRILLATION, UNSPECIFIED TYPE: ICD-10-CM

## 2024-08-23 RX ORDER — KETOCONAZOLE 20 MG/ML
SHAMPOO TOPICAL
COMMUNITY
Start: 2024-06-28

## 2024-08-23 NOTE — PROGRESS NOTES
"CHIEF COMPLAINT/ HPI:  Atrial Fibrillation and Follow-up (Pt states that this is routine, she had labs, she has no new issues. )              Objective   Vital Signs  Vitals:    08/23/24 1012   BP: 105/61   Pulse: 84   Temp: 96.9 °F (36.1 °C)   TempSrc: Skin   SpO2: 96%   Weight: 95.3 kg (210 lb)   Height: 162.6 cm (64.02\")      Body mass index is 36.03 kg/m².  Review of Systems   Constitutional: Negative.    HENT: Negative.     Eyes: Negative.    Respiratory: Negative.     Cardiovascular: Negative.    Gastrointestinal: Negative.    Endocrine: Negative.    Genitourinary: Negative.    Musculoskeletal: Negative.    Allergic/Immunologic: Negative.    Neurological: Negative.    Hematological: Negative.    Psychiatric/Behavioral: Negative.        Physical Exam  Constitutional:       General: She is not in acute distress.     Appearance: Normal appearance.   HENT:      Head: Normocephalic.      Mouth/Throat:      Mouth: Mucous membranes are moist.   Eyes:      Conjunctiva/sclera: Conjunctivae normal.      Pupils: Pupils are equal, round, and reactive to light.   Cardiovascular:      Rate and Rhythm: Normal rate and regular rhythm.      Pulses: Normal pulses.      Heart sounds: Normal heart sounds.   Pulmonary:      Effort: Pulmonary effort is normal.      Breath sounds: Normal breath sounds.   Abdominal:      General: Abdomen is flat. Bowel sounds are normal.      Palpations: Abdomen is soft.   Musculoskeletal:         General: No swelling. Normal range of motion.      Cervical back: Neck supple.   Skin:     General: Skin is warm and dry.      Coloration: Skin is not jaundiced.   Neurological:      General: No focal deficit present.      Mental Status: She is alert and oriented to person, place, and time. Mental status is at baseline.   Psychiatric:         Mood and Affect: Mood normal.         Behavior: Behavior normal.         Thought Content: Thought content normal.         Judgment: Judgment normal.     Partial " carolee impaction  Result Review :   Lab Results   Component Value Date    PROBNP 1,589.0 08/19/2024    PROBNP 1,683.0 08/10/2023    PROBNP 1,691.0 01/24/2023     CMP          2/14/2024    09:33 8/19/2024    10:15   CMP   Glucose 95  94    BUN 23  21    Creatinine 1.13  0.99    EGFR 49.0  57.4    Sodium 142  142    Potassium 4.7  4.3    Chloride 104  104    Calcium 9.5  9.7    Total Protein 7.1  7.0    Albumin 4.2  4.1    Globulin 2.9  2.9    Total Bilirubin 0.8  1.1    Alkaline Phosphatase 77  72    AST (SGOT) 20  19    ALT (SGPT) 7  11    Albumin/Globulin Ratio 1.4  1.4    BUN/Creatinine Ratio 20.4  21.2    Anion Gap 12.0  10.3      CBC w/diff          2/14/2024    09:33 8/19/2024    10:15   CBC w/Diff   WBC 5.88  6.19    RBC 4.15  4.17    Hemoglobin 13.3  13.5    Hematocrit 40.1  40.1    MCV 96.6  96.2    MCH 32.0  32.4    MCHC 33.2  33.7    RDW 12.9  12.5    Platelets 181  165    Neutrophil Rel % 61.1  58.3    Immature Granulocyte Rel % 0.2  0.3    Lymphocyte Rel % 25.9  28.1    Monocyte Rel % 9.5  10.0    Eosinophil Rel % 2.6  2.7    Basophil Rel % 0.7  0.6       Lipid Panel          2/14/2024    09:33 8/19/2024    10:15   Lipid Panel   Total Cholesterol 141  146    Triglycerides 70  83    HDL Cholesterol 54  54    VLDL Cholesterol 14  16    LDL Cholesterol  73  76    LDL/HDL Ratio 1.35  1.40       Lab Results   Component Value Date    TSH 2.610 08/19/2024    TSH 3.060 01/24/2023    TSH 2.980 09/22/2022      Lab Results   Component Value Date    FREET4 1.40 08/19/2024    FREET4 1.60 01/24/2023    FREET4 1.51 09/22/2022      A1C Last 3 Results          2/14/2024    09:33 8/19/2024    10:15   HGBA1C Last 3 Results   Hemoglobin A1C 5.50  5.20                        Visit Diagnoses:    ICD-10-CM ICD-9-CM   1. Current mild episode of major depressive disorder, unspecified whether recurrent  F32.0 296.21   2. Primary osteoarthritis of both knees  M17.0 715.16   3. Chronic fatigue syndrome  G93.32 780.71   4. Anxiety,  generalized  F41.1 300.02   5. IFG (impaired fasting glucose)  R73.01 790.21   6. Chronic diastolic (congestive) heart failure  I50.32 428.32     428.0   7. Hypertension, essential  I10 401.9   8. Seasonal allergic rhinitis due to pollen  J30.1 477.0   9. Atrial fibrillation, unspecified type  I48.91 427.31   10. Mixed hyperlipidemia  E78.2 272.2   11. Vitamin D deficiency  E55.9 268.9   12. Class 3 severe obesity due to excess calories with serious comorbidity and body mass index (BMI) of 40.0 to 44.9 in adult  E66.01 278.01    Z68.41 V85.41   13. Hypothyroidism due to Hashimoto's thyroiditis  E03.8 244.8    E06.3 245.2   14. GERD without esophagitis  K21.9 530.81   15. Screening mammogram for breast cancer  Z12.31 V76.12       Assessment and Plan   Diagnoses and all orders for this visit:    1. Current mild episode of major depressive disorder, unspecified whether recurrent (Primary)  -     proBNP; Future  -     Comprehensive Metabolic Panel; Future  -     CBC & Differential; Future    2. Primary osteoarthritis of both knees  -     proBNP; Future  -     Comprehensive Metabolic Panel; Future  -     CBC & Differential; Future    3. Chronic fatigue syndrome  -     proBNP; Future  -     Comprehensive Metabolic Panel; Future  -     CBC & Differential; Future    4. Anxiety, generalized  -     proBNP; Future  -     Comprehensive Metabolic Panel; Future  -     CBC & Differential; Future    5. IFG (impaired fasting glucose)  -     proBNP; Future  -     Comprehensive Metabolic Panel; Future  -     CBC & Differential; Future    6. Chronic diastolic (congestive) heart failure  -     proBNP; Future  -     Comprehensive Metabolic Panel; Future  -     CBC & Differential; Future    7. Hypertension, essential  -     proBNP; Future  -     Comprehensive Metabolic Panel; Future  -     CBC & Differential; Future    8. Seasonal allergic rhinitis due to pollen  -     proBNP; Future  -     Comprehensive Metabolic Panel; Future  -     CBC  & Differential; Future    9. Atrial fibrillation, unspecified type  -     proBNP; Future  -     Comprehensive Metabolic Panel; Future  -     CBC & Differential; Future    10. Mixed hyperlipidemia  -     proBNP; Future  -     Comprehensive Metabolic Panel; Future  -     CBC & Differential; Future    11. Vitamin D deficiency  -     proBNP; Future  -     Comprehensive Metabolic Panel; Future  -     CBC & Differential; Future    12. Class 3 severe obesity due to excess calories with serious comorbidity and body mass index (BMI) of 40.0 to 44.9 in adult  -     proBNP; Future  -     Comprehensive Metabolic Panel; Future  -     CBC & Differential; Future    13. Hypothyroidism due to Hashimoto's thyroiditis  -     proBNP; Future  -     Comprehensive Metabolic Panel; Future  -     CBC & Differential; Future    14. GERD without esophagitis  -     proBNP; Future  -     Comprehensive Metabolic Panel; Future  -     CBC & Differential; Future    15. Screening mammogram for breast cancer  -     proBNP; Future  -     Comprehensive Metabolic Panel; Future  -     CBC & Differential; Future      Cerumen partial impactions bilateral removed August 2024,    Class 2 Severe Obesity (BMI >=35 and <=39.9). Obesity-related health conditions include the following: hypertension. Obesity is unchanged. BMI is is above average; BMI management plan is completed. We discussed low calorie, low carb based diet program, portion control, and increasing exercise.     Annual wellness visit completed February 19, 2024,    Skin lesion, face, will refer to dermatology--did see and has follow-up again,    Midthoracic back pain--left-sided,- continues tramadol as needed every 6 as needed, continues, discussed physical therapy again pain management, February 2024, and again August 2024    Chronic diastolic CHF.  Current BNP level 1683 August 2023, stable improved ---------------echo on 2/5/2021 which revealed EF 55 to 60%.  with mild MR as well, study was  technically difficult. At that time there was not any significant LVH noted on that echo either. Most likely related to new onset A. Fib = tachycardia induced cardiomyopathy,   --> Echo came back with EF 35 to 40% with moderate global hypokinesis.,  Technically difficult study, moderately dilated left atrium and right atrium moderate mitral annular calcification with thickened leaflets and mild to moderate mitral stenosis minimal prolapse of the anterior mitral leaflet mild aortic sclerosis with trivial aortic regurgitation mild right ventricular pressure elevations at 44 mmHg dilated IVC consistent with elevated central venous pressures moderate size left pleural effusion trivial pericardial effusion------  defer to cardiology on this.  She is diuresing, so we will continue Lasix 40 mg twice daily, potassium pills  2 tabs twice a day ,  Eliquis 5 mg twice daily,  carvedilol 6.25 mg twice a day -------- BR NP level stable at 1691------------------ echocardiogram August 1, 2022 shows fibrocalcific mitral and aortic valves normal LV function moderate mitral and moderate mitral stenosis with regurgitation mild tricuspid regurgitation    atrial fibrillation.  -continue carvedilol, 6.25 twice daily,  Eliquis 5 mg twice daily,      hypertension.  CONT  carvedilol,, 6.25 mg twice daily, Lasix 40 mg twice a day diuretics,, potassium 2 tablets twice a day---     Impaired fasting glucose, hemoglobin A1c, 5.2== August 2024    Left TKA January 2014, [facility], right total knee arthroplasty 2013    Vitamin D deficiency continues on replacement therapy,    Cologuard testing negative, January 2020    Breast cancer left mastectomy January 2011--- benign mammogram May 2023 right sided, mammogram May 31, 2024 benign    Abdominal pains--- colonoscopy July 2021 no obvious lesions, Dr. Perez         Follow Up   Return in about 6 months (around 2/23/2025).  Patient was given instructions and counseling regarding her condition or for health  maintenance advice. Please see specific information pulled into the AVS if appropriate.

## 2024-08-28 NOTE — PROGRESS NOTES
[facility]  Cardiology progress Note    Patient Name: Bella Brandt  : 1945    CHIEF COMPLAINT  Atrial fibrillation        Subjective   Subjective     HISTORY OF PRESENT ILLNESS    Bella Brandt is a 79 y.o. female with history of atrial fibrillation.  No palpitations    REVIEW OF SYSTEMS    Constitutional:    No fever, no weight loss  Skin:     No rash  Otolaryngeal:    No difficulty swallowing  Cardiovascular: See HPI.  Pulmonary:    No cough, no sputum production    Personal History     Social History:    reports that she quit smoking about 57 years ago. Her smoking use included cigarettes. She started smoking about 67 years ago. She has a 5 pack-year smoking history. She has never used smokeless tobacco. She reports that she does not currently use alcohol. She reports that she does not use drugs.    Home Medications:  Current Outpatient Medications on File Prior to Visit   Medication Sig    carvedilol (COREG) 6.25 MG tablet TAKE 1 TABLET BY MOUTH TWICE DAILY WITH MEALS    Eliquis 5 MG tablet tablet TAKE 1 TABLET BY MOUTH EVERY 12 HOURS    furosemide (LASIX) 40 MG tablet TAKE 1 TABLET BY MOUTH TWICE DAILY    ketoconazole (NIZORAL) 2 % shampoo WASH TOPICALLY TO THE SCALP DAILY FOR 2 WEEKS THEN APPLY TOPICALLY EVERY OTHER DAY    Magnesium 500 MG capsule Take 500 mg by mouth Every Other Day.    polyethylene glycol (MIRALAX) 17 g packet Take 17 g by mouth Daily As Needed (Use if senna-docusate is ineffective).    potassium chloride (MICRO-K) 10 MEQ CR capsule TAKE 2 CAPSULES BY MOUTH TWICE DAILY WITH MEALS    traMADol (ULTRAM) 50 MG tablet TAKE 1 TABLET BY MOUTH EVERY 6 HOURS AS NEEDED FOR MODERATE PAIN     No current facility-administered medications on file prior to visit.       Past Medical History:   Diagnosis Date    Allergies     Atrial fibrillation, persistent 12/10/2021    Chronic fatigue, unspecified     Depression     Diverticulitis of large intestine without perforation or abscess  without bleeding 9/9/2021    Essential (primary) hypertension .    GERD without esophagitis     Hypothyroidism     Syncope and collapse     Type 2 diabetes mellitus     Vitamin D deficiency, unspecified        Allergies:  No Known Allergies    Objective    Objective       Vitals:   Heart Rate:  [78] 78  BP: (116)/(90) 116/90  Body mass index is 36.05 kg/m².     PHYSICAL EXAM:    General Appearance:   well developed  well nourished  HENT:   oropharynx moist  lips not cyanotic  Neck:  thyroid not enlarged  supple  Respiratory:  no respiratory distress  normal breath sounds  no rales  Cardiovascular:  no jugular venous distention  regular rhythm  apical impulse normal  S1 normal, S2 normal  no S3, no S4   no murmur  no rub, no thrill  carotid pulses normal; no bruit  pedal pulses normal  lower extremity edema: none    Skin:   warm, dry  Psychiatric:  judgement and insight appropriate  normal mood and affect        Result Review:  I have personally reviewed the available results from  [x]  Laboratory  [x]  EKG  [x]  Cardiology  [x]  Medications  [x]  Old records  []  Other:     Procedures  Lab Results   Component Value Date    CHOL 146 08/19/2024    CHOL 141 02/14/2024    CHOL 144 01/24/2023     Lab Results   Component Value Date    TRIG 83 08/19/2024    TRIG 70 02/14/2024    TRIG 80 01/24/2023     Lab Results   Component Value Date    HDL 54 08/19/2024    HDL 54 02/14/2024    HDL 48 01/24/2023     Lab Results   Component Value Date    LDL 76 08/19/2024    LDL 73 02/14/2024    LDL 80 01/24/2023     Lab Results   Component Value Date    VLDL 16 08/19/2024    VLDL 14 02/14/2024    VLDL 16 01/24/2023     Results for orders placed in visit on 08/01/22    Adult Transthoracic Echo Complete W/ Cont if Necessary Per Protocol    Interpretation Summary  Fibrocalcific mitral and aortic valves.  Normal left ventricular systolic function.  Moderate mitral regurgitation.  Moderate mitral stenosis.  Mild tricuspid regurgitation.      Impression/Plan:  1.  Chronic diastolic heart failure stable: Continue Lasix 40 mg once a day.  2.  Paroxysmal atrial fibrillation: Continue carvedilol 6.25 mg twice a day.  Continue Eliquis 5 mg twice a day for stroke prevention.  3.  Moderate mitral stenosis/mitral regurgitation: Asymptomatic.  4.  Essential hypertension controlled: Continue carvedilol 6.25 mg twice a day.           Byron Lopez MD   08/29/24   11:23 EDT

## 2024-08-29 ENCOUNTER — OFFICE VISIT (OUTPATIENT)
Dept: CARDIOLOGY | Facility: CLINIC | Age: 79
End: 2024-08-29
Payer: MEDICARE

## 2024-08-29 VITALS
BODY MASS INDEX: 35.85 KG/M2 | DIASTOLIC BLOOD PRESSURE: 90 MMHG | SYSTOLIC BLOOD PRESSURE: 116 MMHG | HEART RATE: 78 BPM | HEIGHT: 64 IN | WEIGHT: 210 LBS

## 2024-08-29 DIAGNOSIS — I48.0 PAROXYSMAL ATRIAL FIBRILLATION: Primary | ICD-10-CM

## 2024-08-29 DIAGNOSIS — I34.0 NONRHEUMATIC MITRAL VALVE REGURGITATION: ICD-10-CM

## 2024-08-29 DIAGNOSIS — I50.32 DIASTOLIC CHF, CHRONIC: ICD-10-CM

## 2024-08-29 PROCEDURE — 3074F SYST BP LT 130 MM HG: CPT | Performed by: SPECIALIST

## 2024-08-29 PROCEDURE — 3080F DIAST BP >= 90 MM HG: CPT | Performed by: SPECIALIST

## 2024-08-29 PROCEDURE — 99214 OFFICE O/P EST MOD 30 MIN: CPT | Performed by: SPECIALIST

## 2024-10-18 RX ORDER — CARVEDILOL 6.25 MG/1
6.25 TABLET ORAL 2 TIMES DAILY WITH MEALS
Qty: 180 TABLET | Refills: 3 | Status: SHIPPED | OUTPATIENT
Start: 2024-10-18

## 2025-02-07 ENCOUNTER — TELEPHONE (OUTPATIENT)
Dept: INTERNAL MEDICINE | Age: 80
End: 2025-02-07
Payer: MEDICARE

## 2025-02-07 DIAGNOSIS — E66.813 CLASS 3 SEVERE OBESITY DUE TO EXCESS CALORIES WITH SERIOUS COMORBIDITY AND BODY MASS INDEX (BMI) OF 40.0 TO 44.9 IN ADULT: ICD-10-CM

## 2025-02-07 DIAGNOSIS — E78.2 MIXED HYPERLIPIDEMIA: ICD-10-CM

## 2025-02-07 DIAGNOSIS — F32.0 CURRENT MILD EPISODE OF MAJOR DEPRESSIVE DISORDER, UNSPECIFIED WHETHER RECURRENT: ICD-10-CM

## 2025-02-07 DIAGNOSIS — F41.1 ANXIETY, GENERALIZED: ICD-10-CM

## 2025-02-07 DIAGNOSIS — E66.01 CLASS 3 SEVERE OBESITY DUE TO EXCESS CALORIES WITH SERIOUS COMORBIDITY AND BODY MASS INDEX (BMI) OF 40.0 TO 44.9 IN ADULT: ICD-10-CM

## 2025-02-07 DIAGNOSIS — E55.9 VITAMIN D DEFICIENCY: ICD-10-CM

## 2025-02-07 DIAGNOSIS — R73.01 IFG (IMPAIRED FASTING GLUCOSE): ICD-10-CM

## 2025-02-07 DIAGNOSIS — R06.02 SHORTNESS OF BREATH ON EXERTION: ICD-10-CM

## 2025-02-07 DIAGNOSIS — M17.0 PRIMARY OSTEOARTHRITIS OF BOTH KNEES: ICD-10-CM

## 2025-02-07 DIAGNOSIS — I50.21 ACUTE SYSTOLIC CONGESTIVE HEART FAILURE: ICD-10-CM

## 2025-02-07 DIAGNOSIS — I10 HYPERTENSION, ESSENTIAL: ICD-10-CM

## 2025-02-07 DIAGNOSIS — I48.91 ATRIAL FIBRILLATION, UNSPECIFIED TYPE: ICD-10-CM

## 2025-02-07 RX ORDER — TRAMADOL HYDROCHLORIDE 50 MG/1
50 TABLET ORAL EVERY 6 HOURS PRN
Qty: 90 TABLET | Refills: 5 | Status: SHIPPED | OUTPATIENT
Start: 2025-02-07

## 2025-02-14 ENCOUNTER — CLINICAL SUPPORT (OUTPATIENT)
Dept: INTERNAL MEDICINE | Age: 80
End: 2025-02-14
Payer: MEDICARE

## 2025-02-14 DIAGNOSIS — I10 HYPERTENSION, ESSENTIAL: ICD-10-CM

## 2025-02-14 DIAGNOSIS — M17.0 PRIMARY OSTEOARTHRITIS OF BOTH KNEES: ICD-10-CM

## 2025-02-14 DIAGNOSIS — J30.1 SEASONAL ALLERGIC RHINITIS DUE TO POLLEN: ICD-10-CM

## 2025-02-14 DIAGNOSIS — K21.9 GERD WITHOUT ESOPHAGITIS: ICD-10-CM

## 2025-02-14 DIAGNOSIS — G93.32 CHRONIC FATIGUE SYNDROME: ICD-10-CM

## 2025-02-14 DIAGNOSIS — E66.813 CLASS 3 SEVERE OBESITY DUE TO EXCESS CALORIES WITH SERIOUS COMORBIDITY AND BODY MASS INDEX (BMI) OF 40.0 TO 44.9 IN ADULT: ICD-10-CM

## 2025-02-14 DIAGNOSIS — R73.01 IFG (IMPAIRED FASTING GLUCOSE): ICD-10-CM

## 2025-02-14 DIAGNOSIS — E66.01 CLASS 3 SEVERE OBESITY DUE TO EXCESS CALORIES WITH SERIOUS COMORBIDITY AND BODY MASS INDEX (BMI) OF 40.0 TO 44.9 IN ADULT: ICD-10-CM

## 2025-02-14 DIAGNOSIS — E06.3 HYPOTHYROIDISM DUE TO HASHIMOTO'S THYROIDITIS: ICD-10-CM

## 2025-02-14 DIAGNOSIS — I48.91 ATRIAL FIBRILLATION, UNSPECIFIED TYPE: ICD-10-CM

## 2025-02-14 DIAGNOSIS — I50.32 CHRONIC DIASTOLIC (CONGESTIVE) HEART FAILURE: ICD-10-CM

## 2025-02-14 DIAGNOSIS — Z12.31 SCREENING MAMMOGRAM FOR BREAST CANCER: ICD-10-CM

## 2025-02-14 DIAGNOSIS — E78.2 MIXED HYPERLIPIDEMIA: ICD-10-CM

## 2025-02-14 DIAGNOSIS — F41.1 ANXIETY, GENERALIZED: ICD-10-CM

## 2025-02-14 DIAGNOSIS — F32.0 CURRENT MILD EPISODE OF MAJOR DEPRESSIVE DISORDER, UNSPECIFIED WHETHER RECURRENT: ICD-10-CM

## 2025-02-14 DIAGNOSIS — E55.9 VITAMIN D DEFICIENCY: ICD-10-CM

## 2025-02-14 LAB
ALBUMIN SERPL-MCNC: 3.6 G/DL (ref 3.5–5.2)
ALBUMIN/GLOB SERPL: 0.9 G/DL
ALP SERPL-CCNC: 81 U/L (ref 39–117)
ALT SERPL W P-5'-P-CCNC: 8 U/L (ref 1–33)
ANION GAP SERPL CALCULATED.3IONS-SCNC: 12.8 MMOL/L (ref 5–15)
AST SERPL-CCNC: 20 U/L (ref 1–32)
BASOPHILS # BLD AUTO: 0.05 10*3/MM3 (ref 0–0.2)
BASOPHILS NFR BLD AUTO: 0.7 % (ref 0–1.5)
BILIRUB SERPL-MCNC: 0.9 MG/DL (ref 0–1.2)
BUN SERPL-MCNC: 17 MG/DL (ref 8–23)
BUN/CREAT SERPL: 14.8 (ref 7–25)
CALCIUM SPEC-SCNC: 10 MG/DL (ref 8.6–10.5)
CHLORIDE SERPL-SCNC: 103 MMOL/L (ref 98–107)
CO2 SERPL-SCNC: 25.2 MMOL/L (ref 22–29)
CREAT SERPL-MCNC: 1.15 MG/DL (ref 0.57–1)
DEPRECATED RDW RBC AUTO: 44.7 FL (ref 37–54)
EGFRCR SERPLBLD CKD-EPI 2021: 47.7 ML/MIN/1.73
EOSINOPHIL # BLD AUTO: 0.16 10*3/MM3 (ref 0–0.4)
EOSINOPHIL NFR BLD AUTO: 2.3 % (ref 0.3–6.2)
ERYTHROCYTE [DISTWIDTH] IN BLOOD BY AUTOMATED COUNT: 12.9 % (ref 12.3–15.4)
GLOBULIN UR ELPH-MCNC: 4 GM/DL
GLUCOSE SERPL-MCNC: 114 MG/DL (ref 65–99)
HCT VFR BLD AUTO: 40.2 % (ref 34–46.6)
HGB BLD-MCNC: 13.8 G/DL (ref 12–15.9)
IMM GRANULOCYTES # BLD AUTO: 0.02 10*3/MM3 (ref 0–0.05)
IMM GRANULOCYTES NFR BLD AUTO: 0.3 % (ref 0–0.5)
LYMPHOCYTES # BLD AUTO: 1.86 10*3/MM3 (ref 0.7–3.1)
LYMPHOCYTES NFR BLD AUTO: 26.5 % (ref 19.6–45.3)
MCH RBC QN AUTO: 32.5 PG (ref 26.6–33)
MCHC RBC AUTO-ENTMCNC: 34.3 G/DL (ref 31.5–35.7)
MCV RBC AUTO: 94.8 FL (ref 79–97)
MONOCYTES # BLD AUTO: 0.59 10*3/MM3 (ref 0.1–0.9)
MONOCYTES NFR BLD AUTO: 8.4 % (ref 5–12)
NEUTROPHILS NFR BLD AUTO: 4.35 10*3/MM3 (ref 1.7–7)
NEUTROPHILS NFR BLD AUTO: 61.8 % (ref 42.7–76)
NRBC BLD AUTO-RTO: 0 /100 WBC (ref 0–0.2)
NT-PROBNP SERPL-MCNC: 1564 PG/ML (ref 0–1800)
PLATELET # BLD AUTO: 185 10*3/MM3 (ref 140–450)
PMV BLD AUTO: 11.1 FL (ref 6–12)
POTASSIUM SERPL-SCNC: 4.2 MMOL/L (ref 3.5–5.2)
PROT SERPL-MCNC: 7.6 G/DL (ref 6–8.5)
RBC # BLD AUTO: 4.24 10*6/MM3 (ref 3.77–5.28)
SODIUM SERPL-SCNC: 141 MMOL/L (ref 136–145)
WBC NRBC COR # BLD AUTO: 7.03 10*3/MM3 (ref 3.4–10.8)

## 2025-02-14 PROCEDURE — 85025 COMPLETE CBC W/AUTO DIFF WBC: CPT | Performed by: INTERNAL MEDICINE

## 2025-02-14 PROCEDURE — 83880 ASSAY OF NATRIURETIC PEPTIDE: CPT | Performed by: INTERNAL MEDICINE

## 2025-02-14 PROCEDURE — 80053 COMPREHEN METABOLIC PANEL: CPT | Performed by: INTERNAL MEDICINE

## 2025-02-14 PROCEDURE — 36415 COLL VENOUS BLD VENIPUNCTURE: CPT | Performed by: INTERNAL MEDICINE

## 2025-02-21 RX ORDER — POTASSIUM CHLORIDE 750 MG/1
20 CAPSULE, EXTENDED RELEASE ORAL 2 TIMES DAILY WITH MEALS
Qty: 180 CAPSULE | Refills: 3 | Status: SHIPPED | OUTPATIENT
Start: 2025-02-21

## 2025-02-27 ENCOUNTER — TRANSCRIBE ORDERS (OUTPATIENT)
Dept: ADMINISTRATIVE | Facility: HOSPITAL | Age: 80
End: 2025-02-27
Payer: MEDICARE

## 2025-02-27 ENCOUNTER — OFFICE VISIT (OUTPATIENT)
Dept: INTERNAL MEDICINE | Age: 80
End: 2025-02-27
Payer: MEDICARE

## 2025-02-27 VITALS
SYSTOLIC BLOOD PRESSURE: 118 MMHG | DIASTOLIC BLOOD PRESSURE: 76 MMHG | TEMPERATURE: 98.2 F | HEART RATE: 99 BPM | WEIGHT: 216 LBS | BODY MASS INDEX: 36.88 KG/M2 | OXYGEN SATURATION: 93 % | HEIGHT: 64 IN

## 2025-02-27 DIAGNOSIS — Z12.31 SCREENING MAMMOGRAM FOR BREAST CANCER: ICD-10-CM

## 2025-02-27 DIAGNOSIS — R73.01 IFG (IMPAIRED FASTING GLUCOSE): ICD-10-CM

## 2025-02-27 DIAGNOSIS — F41.1 ANXIETY, GENERALIZED: ICD-10-CM

## 2025-02-27 DIAGNOSIS — E78.2 MIXED HYPERLIPIDEMIA: ICD-10-CM

## 2025-02-27 DIAGNOSIS — E66.813 CLASS 3 SEVERE OBESITY DUE TO EXCESS CALORIES WITH SERIOUS COMORBIDITY AND BODY MASS INDEX (BMI) OF 40.0 TO 44.9 IN ADULT: ICD-10-CM

## 2025-02-27 DIAGNOSIS — E06.3 HYPOTHYROIDISM DUE TO HASHIMOTO THYROIDITIS: ICD-10-CM

## 2025-02-27 DIAGNOSIS — E55.9 VITAMIN D DEFICIENCY: ICD-10-CM

## 2025-02-27 DIAGNOSIS — M54.9 CHRONIC BILATERAL BACK PAIN, UNSPECIFIED BACK LOCATION: ICD-10-CM

## 2025-02-27 DIAGNOSIS — I50.32 CHRONIC DIASTOLIC (CONGESTIVE) HEART FAILURE: ICD-10-CM

## 2025-02-27 DIAGNOSIS — Z00.00 MEDICARE ANNUAL WELLNESS VISIT, SUBSEQUENT: Primary | ICD-10-CM

## 2025-02-27 DIAGNOSIS — Z12.31 SCREENING MAMMOGRAM, ENCOUNTER FOR: Primary | ICD-10-CM

## 2025-02-27 DIAGNOSIS — I48.91 ATRIAL FIBRILLATION, UNSPECIFIED TYPE: ICD-10-CM

## 2025-02-27 DIAGNOSIS — G93.32 CHRONIC FATIGUE SYNDROME: ICD-10-CM

## 2025-02-27 DIAGNOSIS — I10 HYPERTENSION, ESSENTIAL: ICD-10-CM

## 2025-02-27 DIAGNOSIS — G89.29 CHRONIC BILATERAL BACK PAIN, UNSPECIFIED BACK LOCATION: ICD-10-CM

## 2025-02-27 DIAGNOSIS — E66.01 CLASS 3 SEVERE OBESITY DUE TO EXCESS CALORIES WITH SERIOUS COMORBIDITY AND BODY MASS INDEX (BMI) OF 40.0 TO 44.9 IN ADULT: ICD-10-CM

## 2025-02-27 DIAGNOSIS — F32.0 CURRENT MILD EPISODE OF MAJOR DEPRESSIVE DISORDER, UNSPECIFIED WHETHER RECURRENT: ICD-10-CM

## 2025-02-27 PROCEDURE — G0439 PPPS, SUBSEQ VISIT: HCPCS | Performed by: INTERNAL MEDICINE

## 2025-02-27 PROCEDURE — 1170F FXNL STATUS ASSESSED: CPT | Performed by: INTERNAL MEDICINE

## 2025-02-27 PROCEDURE — 3074F SYST BP LT 130 MM HG: CPT | Performed by: INTERNAL MEDICINE

## 2025-02-27 PROCEDURE — 99214 OFFICE O/P EST MOD 30 MIN: CPT | Performed by: INTERNAL MEDICINE

## 2025-02-27 PROCEDURE — 3078F DIAST BP <80 MM HG: CPT | Performed by: INTERNAL MEDICINE

## 2025-02-27 PROCEDURE — 1125F AMNT PAIN NOTED PAIN PRSNT: CPT | Performed by: INTERNAL MEDICINE

## 2025-02-27 NOTE — PROGRESS NOTES
"Chief Complaint   Patient presents with    Medicare Wellness-subsequent     Wellness visit, Lab follow up. Pt has some issues with her teeth, needing to discuss medication for dental work, asking what needs to be stopped.         Objective   Vital Signs  Vitals:    02/27/25 1055   BP: 118/76   BP Location: Right arm   Patient Position: Sitting   Pulse: 99   Temp: 98.2 °F (36.8 °C)   SpO2: 93%   Weight: 98 kg (216 lb)   Height: 162.6 cm (64.02\")      Body mass index is 37.06 kg/m².  Review of Systems   Constitutional: Negative.    HENT: Negative.     Eyes: Negative.    Respiratory: Negative.     Cardiovascular: Negative.    Gastrointestinal: Negative.    Endocrine: Negative.    Genitourinary: Negative.    Musculoskeletal:  Positive for arthralgias and back pain.   Allergic/Immunologic: Negative.    Neurological: Negative.    Hematological: Negative.    Psychiatric/Behavioral: Negative.        Physical Exam  Constitutional:       General: She is not in acute distress.     Appearance: Normal appearance. She is obese.   HENT:      Head: Normocephalic.      Mouth/Throat:      Mouth: Mucous membranes are moist.   Eyes:      Conjunctiva/sclera: Conjunctivae normal.      Pupils: Pupils are equal, round, and reactive to light.   Cardiovascular:      Rate and Rhythm: Normal rate. Rhythm irregular.      Pulses: Normal pulses.      Heart sounds: Murmur heard.   Pulmonary:      Effort: Pulmonary effort is normal.      Breath sounds: Normal breath sounds.   Abdominal:      General: Bowel sounds are normal.      Palpations: Abdomen is soft.   Musculoskeletal:         General: No swelling. Normal range of motion.      Cervical back: Neck supple.   Skin:     General: Skin is warm and dry.      Coloration: Skin is not jaundiced.   Neurological:      General: No focal deficit present.      Mental Status: She is alert and oriented to person, place, and time. Mental status is at baseline.   Psychiatric:         Mood and Affect: Mood " normal.         Behavior: Behavior normal.         Thought Content: Thought content normal.         Judgment: Judgment normal.     Cerumen impactions bilateral  Result Review :   Lab Results   Component Value Date    PROBNP 1,564.0 02/14/2025    PROBNP 1,589.0 08/19/2024    PROBNP 1,683.0 08/10/2023     CMP          8/19/2024    10:15 2/14/2025    10:14   CMP   Glucose 94  114    BUN 21  17    Creatinine 0.99  1.15    EGFR 57.4  47.7    Sodium 142  141    Potassium 4.3  4.2    Chloride 104  103    Calcium 9.7  10.0    Total Protein 7.0  7.6    Albumin 4.1  3.6    Globulin 2.9  4.0    Total Bilirubin 1.1  0.9    Alkaline Phosphatase 72  81    AST (SGOT) 19  20    ALT (SGPT) 11  8    Albumin/Globulin Ratio 1.4  0.9    BUN/Creatinine Ratio 21.2  14.8    Anion Gap 10.3  12.8      CBC w/diff          8/19/2024    10:15 2/14/2025    10:14   CBC w/Diff   WBC 6.19  7.03    RBC 4.17  4.24    Hemoglobin 13.5  13.8    Hematocrit 40.1  40.2    MCV 96.2  94.8    MCH 32.4  32.5    MCHC 33.7  34.3    RDW 12.5  12.9    Platelets 165  185    Neutrophil Rel % 58.3  61.8    Immature Granulocyte Rel % 0.3  0.3    Lymphocyte Rel % 28.1  26.5    Monocyte Rel % 10.0  8.4    Eosinophil Rel % 2.7  2.3    Basophil Rel % 0.6  0.7       Lipid Panel          8/19/2024    10:15   Lipid Panel   Total Cholesterol 146    Triglycerides 83    HDL Cholesterol 54    VLDL Cholesterol 16    LDL Cholesterol  76    LDL/HDL Ratio 1.40       Lab Results   Component Value Date    TSH 2.610 08/19/2024    TSH 3.060 01/24/2023    TSH 2.980 09/22/2022      Lab Results   Component Value Date    FREET4 1.40 08/19/2024    FREET4 1.60 01/24/2023    FREET4 1.51 09/22/2022      A1C Last 3 Results          8/19/2024    10:15   HGBA1C Last 3 Results   Hemoglobin A1C 5.20                        Visit Diagnoses:    ICD-10-CM ICD-9-CM   1. Medicare annual wellness visit, subsequent  Z00.00 V70.0   2. IFG (impaired fasting glucose)  R73.01 790.21   3. Chronic diastolic  (congestive) heart failure  I50.32 428.32     428.0   4. Hypertension, essential  I10 401.9   5. Atrial fibrillation, unspecified type  I48.91 427.31   6. Mixed hyperlipidemia  E78.2 272.2   7. Vitamin D deficiency  E55.9 268.9   8. Class 3 severe obesity due to excess calories with serious comorbidity and body mass index (BMI) of 40.0 to 44.9 in adult  E66.813 278.01    E66.01 V85.41    Z68.41    9. Screening mammogram for breast cancer  Z12.31 V76.12   10. Chronic fatigue syndrome  G93.32 780.71   11. Anxiety, generalized  F41.1 300.02   12. Current mild episode of major depressive disorder, unspecified whether recurrent  F32.0 296.21   13. Hypothyroidism due to Hashimoto thyroiditis  E06.3 245.2   14. Chronic bilateral back pain, unspecified back location  M54.9 724.5    G89.29 338.29       Assessment and Plan   Diagnoses and all orders for this visit:    1. Medicare annual wellness visit, subsequent (Primary)  Assessment & Plan:           Orders:  -     proBNP; Future  -     Hemoglobin A1c; Future  -     Comprehensive Metabolic Panel; Future  -     Lipid Panel; Future    2. IFG (impaired fasting glucose)  Assessment & Plan:           Orders:  -     proBNP; Future  -     Hemoglobin A1c; Future  -     Comprehensive Metabolic Panel; Future  -     Lipid Panel; Future    3. Chronic diastolic (congestive) heart failure  Assessment & Plan:  Congestive heart failure due to hypertension.  Heart failure is stable.  NYHA Class II.  Continue current treatment regimen.  Heart failure will be reassessed in 6 months.               Orders:  -     proBNP; Future  -     Hemoglobin A1c; Future  -     Comprehensive Metabolic Panel; Future  -     Lipid Panel; Future    4. Hypertension, essential  Assessment & Plan:  Hypertension is stable and controlled  Continue current treatment regimen.  Blood pressure will be reassessed in 6 months.           Orders:  -     proBNP; Future  -     Hemoglobin A1c; Future  -     Comprehensive  Metabolic Panel; Future  -     Lipid Panel; Future    5. Atrial fibrillation, unspecified type  Assessment & Plan:           Orders:  -     proBNP; Future  -     Hemoglobin A1c; Future  -     Comprehensive Metabolic Panel; Future  -     Lipid Panel; Future    6. Mixed hyperlipidemia  Assessment & Plan:   Lipid abnormalities are stable    Plan:  Continue same medication/s without change.      Discussed medication dosage, use, side effects, and goals of treatment in detail.    Counseled patient on lifestyle modifications to help control hyperlipidemia.     Patient Treatment Goals:   LDL goal is less than 70    Followup in 6 months.           Orders:  -     proBNP; Future  -     Hemoglobin A1c; Future  -     Comprehensive Metabolic Panel; Future  -     Lipid Panel; Future    7. Vitamin D deficiency  Assessment & Plan:           Orders:  -     proBNP; Future  -     Hemoglobin A1c; Future  -     Comprehensive Metabolic Panel; Future  -     Lipid Panel; Future    8. Class 3 severe obesity due to excess calories with serious comorbidity and body mass index (BMI) of 40.0 to 44.9 in adult  Assessment & Plan:  Patient's (Body mass index is 37.06 kg/m².) indicates that they are obese (BMI >30) with health conditions that include hypertension . Weight is unchanged. BMI  is above average; BMI management plan is completed. We discussed portion control and increasing exercise.            Orders:  -     proBNP; Future  -     Hemoglobin A1c; Future  -     Comprehensive Metabolic Panel; Future  -     Lipid Panel; Future    9. Screening mammogram for breast cancer  Assessment & Plan:           Orders:  -     proBNP; Future  -     Hemoglobin A1c; Future  -     Comprehensive Metabolic Panel; Future  -     Lipid Panel; Future    10. Chronic fatigue syndrome  Assessment & Plan:           Orders:  -     proBNP; Future  -     Hemoglobin A1c; Future  -     Comprehensive Metabolic Panel; Future  -     Lipid Panel; Future    11. Anxiety,  generalized  Assessment & Plan:  Psychological condition is stable.  Continue current treatment regimen.  Psychological condition  will be reassessed in 6 months.           Orders:  -     proBNP; Future  -     Hemoglobin A1c; Future  -     Comprehensive Metabolic Panel; Future  -     Lipid Panel; Future    12. Current mild episode of major depressive disorder, unspecified whether recurrent  Assessment & Plan:  Patient's depression is a recurrent episode that is mild without psychosis. Depression is in partial remission and stable.    Plan:   Continue current medication therapy     Followup in 6 months.            Orders:  -     proBNP; Future  -     Hemoglobin A1c; Future  -     Comprehensive Metabolic Panel; Future  -     Lipid Panel; Future    13. Hypothyroidism due to Hashimoto thyroiditis  Assessment & Plan:           Orders:  -     proBNP; Future  -     Hemoglobin A1c; Future  -     Comprehensive Metabolic Panel; Future  -     Lipid Panel; Future    14. Chronic bilateral back pain, unspecified back location  Assessment & Plan:    Orders:    Ambulatory Referral to Pain Management Clinic      Orders:  -     Ambulatory Referral to Pain Management Clinic  -     proBNP; Future  -     Hemoglobin A1c; Future  -     Comprehensive Metabolic Panel; Future  -     Lipid Panel; Future        Medicare wellness completed February 27, 2025     Class 2 Severe Obesity (BMI >=35 and <=39.9). Obesity-related health conditions include the following: hypertension. Obesity is unchanged. BMI is is above average; BMI management plan is completed. We discussed low calorie, low carb based diet program, portion control, and increasing exercise.     Skin lesion, face, will refer to dermatology--did see and has follow-up again,    Midthoracic back pain--left-sided,- continues tramadol as needed every 6 as needed, continues, discussed physical therapy again pain management, February 2024, and again August 2024------ consider pain management  referral, February 2025    Chronic diastolic CHF. ------echo on 2/5/2021 which revealed EF 55 to 60%.  with mild MR as well, study was technically difficult.      new onset A. Fib = tachycardia induced cardiomyopathy,   --> Echo came back with EF 35 to 40% with moderate global hypokinesis.,  Technically difficult study, moderately dilated left atrium and right atrium moderate mitral annular calcification with thickened leaflets and mild to moderate mitral stenosis minimal prolapse of the anterior mitral leaflet mild aortic sclerosis with trivial aortic regurgitation mild right ventricular pressure elevations at 44 mmHg dilated IVC consistent with elevated central venous pressures moderate size left pleural effusion trivial pericardial effusion------ continue Lasix 40 mg twice daily, potassium pills  2 tabs twice a day ,  Eliquis 5 mg twice daily,  carvedilol 6.25 mg twice a day -------- BR NP level stable at 1691------------------ echocardiogram August 1, 2022 shows fibrocalcific mitral and aortic valves normal LV function moderate mitral and moderate mitral stenosis with regurgitation mild tricuspid regurgitation    atrial fibrillation.  -continue carvedilol, 6.25 twice daily,  Eliquis 5 mg twice daily,      hypertension.  CONT  carvedilol,, 6.25 mg twice daily, Lasix 40 mg twice a day diuretics,, potassium 2 tablets twice a day---     Impaired fasting glucose, hemoglobin A1c, 5.2== August 2024    Left TKA January 2014, [facility], right total knee arthroplasty 2013    Vitamin D deficiency continues on replacement therapy,    Cologuard testing negative, January 2020    Breast cancer left mastectomy January 2011--- benign mammogram May 2023 right sided, mammogram May 31, 2024 benign    Abdominal pains--- colonoscopy July 2021 no obvious lesions, Dr. Perez   Follow Up   Return in about 6 months (around 8/27/2025).  Patient was given instructions and counseling regarding her condition or for health maintenance advice.  Please see specific information pulled into the AVS if appropriate.

## 2025-02-27 NOTE — ASSESSMENT & PLAN NOTE
Congestive heart failure due to hypertension.  Heart failure is stable.  NYHA Class II.  Continue current treatment regimen.  Heart failure will be reassessed in 6 months.

## 2025-02-27 NOTE — ASSESSMENT & PLAN NOTE
Patient's depression is a recurrent episode that is mild without psychosis. Depression is in partial remission and stable.    Plan:   Continue current medication therapy     Followup in 6 months.

## 2025-02-27 NOTE — PROGRESS NOTES
Subjective   The ABCs of the Annual Wellness Visit  Medicare Wellness Visit      Bella Brandt is a 80 y.o. patient who presents for a Medicare Wellness Visit.    The following portions of the patient's history were reviewed and   updated as appropriate: allergies, current medications, past family history, past medical history, past social history, past surgical history, and problem list.    Compared to one year ago, the patient's physical   health is worse.  Compared to one year ago, the patient's mental   health is the same.    Recent Hospitalizations:  She was not admitted to the hospital during the last year.     Current Medical Providers:  Patient Care Team:  Taiwo Nazario MD as PCP - General (Internal Medicine)    Outpatient Medications Prior to Visit   Medication Sig Dispense Refill    carvedilol (COREG) 6.25 MG tablet TAKE 1 TABLET BY MOUTH TWICE DAILY WITH MEALS 180 tablet 3    Eliquis 5 MG tablet tablet TAKE 1 TABLET BY MOUTH EVERY 12 HOURS 180 tablet 3    furosemide (LASIX) 40 MG tablet TAKE 1 TABLET BY MOUTH TWICE DAILY 60 tablet 5    ketoconazole (NIZORAL) 2 % shampoo WASH TOPICALLY TO THE SCALP DAILY FOR 2 WEEKS THEN APPLY TOPICALLY EVERY OTHER DAY      Magnesium 500 MG capsule Take 500 mg by mouth Every Other Day.      polyethylene glycol (MIRALAX) 17 g packet Take 17 g by mouth Daily As Needed (Use if senna-docusate is ineffective). 30 packet 5    potassium chloride (MICRO-K) 10 MEQ CR capsule Take 2 capsules by mouth 2 (Two) Times a Day With Meals. 180 capsule 3    traMADol (ULTRAM) 50 MG tablet Take 1 tablet by mouth Every 6 (Six) Hours As Needed for Moderate Pain. 90 tablet 5     No facility-administered medications prior to visit.     Opioid medication/s are on active medication list.  and I have evaluated her active treatment plan and pain score trends (see table).  Vitals:    02/27/25 1055   PainSc: 4      I have reviewed the chart for potential of high risk medication and harmful  "drug interactions in the elderly.        Aspirin is not on active medication list.  Aspirin use is contraindicated for this patient due to: current use of Eliquis.  .    Patient Active Problem List   Diagnosis    Hypertension, essential    Vitamin D deficiency    Hypothyroidism    GERD without esophagitis    Depression    Anxiety, generalized    IFG (impaired fasting glucose)    Screening mammogram for breast cancer    Hematochezia    Atrial fibrillation    Shortness of breath on exertion    Acute systolic congestive heart failure    Mixed hyperlipidemia    Class 3 severe obesity due to excess calories with serious comorbidity and body mass index (BMI) of 40.0 to 44.9 in adult    Primary osteoarthritis of both knees    Chronic fatigue syndrome    Seasonal allergic rhinitis    Hypotension due to drugs    Chronic diastolic (congestive) heart failure    Medicare annual wellness visit, subsequent    Chronic left-sided thoracic back pain    Chronic bilateral back pain     Advance Care Planning Advance Directive is on file.  ACP discussion was held with the patient during this visit. Patient has an advance directive in EMR which is still valid.             Objective   Vitals:    02/27/25 1055   BP: 118/76   BP Location: Right arm   Patient Position: Sitting   Pulse: 99   Temp: 98.2 °F (36.8 °C)   SpO2: 93%   Weight: 98 kg (216 lb)   Height: 162.6 cm (64.02\")   PainSc: 4        Estimated body mass index is 37.06 kg/m² as calculated from the following:    Height as of this encounter: 162.6 cm (64.02\").    Weight as of this encounter: 98 kg (216 lb).                Does the patient have evidence of cognitive impairment? No                                                                                               Health  Risk Assessment    Smoking Status:  Social History     Tobacco Use   Smoking Status Former    Current packs/day: 0.00    Average packs/day: 0.5 packs/day for 10.0 years (5.0 ttl pk-yrs)    Types: " Cigarettes    Start date:     Quit date:     Years since quittin.1   Smokeless Tobacco Never     Alcohol Consumption:  Social History     Substance and Sexual Activity   Alcohol Use Not Currently       Fall Risk Screen  STEADI Fall Risk Assessment was completed, and patient is at LOW risk for falls.Assessment completed on:2025    Depression Screening   Little interest or pleasure in doing things? Not at all   Feeling down, depressed, or hopeless? Several days   PHQ-2 Total Score 1      Health Habits and Functional and Cognitive Screenin/27/2025    11:00 AM   Functional & Cognitive Status   Do you have difficulty preparing food and eating? No   Do you have difficulty bathing yourself, getting dressed or grooming yourself? No   Do you have difficulty using the toilet? No   Do you have difficulty moving around from place to place? No   Do you have trouble with steps or getting out of a bed or a chair? No   Current Diet Other   Dental Exam Up to date   Eye Exam Up to date   Exercise (times per week) 3 times per week   Current Exercises Include Other;House Cleaning   Do you need help using the phone?  No   Are you deaf or do you have serious difficulty hearing?  No   Do you need help to go to places out of walking distance? Yes   Do you need help shopping? No   Do you need help preparing meals?  No   Do you need help with housework?  No   Do you need help with laundry? No   Do you need help taking your medications? No   Do you need help managing money? No   Do you ever drive or ride in a car without wearing a seat belt? No   Have you felt unusual stress, anger or loneliness in the last month? No   Who do you live with? Child   If you need help, do you have trouble finding someone available to you? No   Have you been bothered in the last four weeks by sexual problems? No   Do you have difficulty concentrating, remembering or making decisions? No           Age-appropriate Screening  Schedule:  Refer to the list below for future screening recommendations based on patient's age, sex and/or medical conditions. Orders for these recommended tests are listed in the plan section. The patient has been provided with a written plan.    Health Maintenance List  Health Maintenance   Topic Date Due    DXA SCAN  Never done    TDAP/TD VACCINES (1 - Tdap) Never done    ZOSTER VACCINE (2 of 2) 07/13/2018    ANNUAL WELLNESS VISIT  02/19/2025    LIPID PANEL  08/19/2025    BMI FOLLOWUP  08/23/2025    COVID-19 Vaccine  Completed    RSV Vaccine - Adults  Completed    INFLUENZA VACCINE  Completed    Pneumococcal Vaccine 50+  Completed    MAMMOGRAM  Discontinued    HEMOGLOBIN A1C  Discontinued    COLORECTAL CANCER SCREENING  Discontinued                                                                                                                                                CMS Preventative Services Quick Reference  Risk Factors Identified During Encounter  Chronic Pain: Pain Management Referral Ordered    The above risks/problems have been discussed with the patient.  Pertinent information has been shared with the patient in the After Visit Summary.  An After Visit Summary and PPPS were made available to the patient.    Follow Up:   Next Medicare Wellness visit to be scheduled in 1 year.     Assessment & Plan  Medicare annual wellness visit, subsequent         IFG (impaired fasting glucose)         Chronic diastolic (congestive) heart failure  Congestive heart failure due to hypertension.  Heart failure is stable.  NYHA Class II.  Continue current treatment regimen.  Heart failure will be reassessed in 6 months.             Hypertension, essential  Hypertension is stable and controlled  Continue current treatment regimen.  Blood pressure will be reassessed in 6 months.         Atrial fibrillation, unspecified type         Mixed hyperlipidemia   Lipid abnormalities are stable    Plan:  Continue same medication/s  without change.      Discussed medication dosage, use, side effects, and goals of treatment in detail.    Counseled patient on lifestyle modifications to help control hyperlipidemia.     Patient Treatment Goals:   LDL goal is less than 70    Followup in 6 months.         Vitamin D deficiency         Class 3 severe obesity due to excess calories with serious comorbidity and body mass index (BMI) of 40.0 to 44.9 in adult  Patient's (Body mass index is 37.06 kg/m².) indicates that they are obese (BMI >30) with health conditions that include hypertension . Weight is unchanged. BMI  is above average; BMI management plan is completed. We discussed portion control and increasing exercise.          Screening mammogram for breast cancer         Chronic fatigue syndrome         Anxiety, generalized  Psychological condition is stable.  Continue current treatment regimen.  Psychological condition  will be reassessed in 6 months.         Current mild episode of major depressive disorder, unspecified whether recurrent  Patient's depression is a recurrent episode that is mild without psychosis. Depression is in partial remission and stable.    Plan:   Continue current medication therapy     Followup in 6 months.          Hypothyroidism due to Hashimoto thyroiditis         Chronic bilateral back pain, unspecified back location    Orders:    Ambulatory Referral to Pain Management Clinic         Follow Up:   No follow-ups on file.

## 2025-02-27 NOTE — ASSESSMENT & PLAN NOTE
Patient's (Body mass index is 37.06 kg/m².) indicates that they are obese (BMI >30) with health conditions that include hypertension . Weight is unchanged. BMI  is above average; BMI management plan is completed. We discussed portion control and increasing exercise.

## 2025-03-05 NOTE — PROGRESS NOTES
[facility]  Cardiology progress Note    Patient Name: Bella Brandt  : 1945    CHIEF COMPLAINT  Atrial fibrillation        Subjective   Subjective     HISTORY OF PRESENT ILLNESS    Bella Brandt is a 80 y.o. female with history of atrial fibrillation.  No palpitations    REVIEW OF SYSTEMS    Constitutional:    No fever, no weight loss  Skin:     No rash  Otolaryngeal:    No difficulty swallowing  Cardiovascular: See HPI.  Pulmonary:    No cough, no sputum production    Personal History     Social History:    reports that she quit smoking about 58 years ago. Her smoking use included cigarettes. She started smoking about 68 years ago. She has a 5 pack-year smoking history. She has never used smokeless tobacco. She reports that she does not currently use alcohol. She reports that she does not use drugs.    Home Medications:  Current Outpatient Medications on File Prior to Visit   Medication Sig    carvedilol (COREG) 6.25 MG tablet TAKE 1 TABLET BY MOUTH TWICE DAILY WITH MEALS    Eliquis 5 MG tablet tablet TAKE 1 TABLET BY MOUTH EVERY 12 HOURS    furosemide (LASIX) 40 MG tablet TAKE 1 TABLET BY MOUTH TWICE DAILY    Magnesium 500 MG capsule Take 500 mg by mouth Every Other Day.    polyethylene glycol (MIRALAX) 17 g packet Take 17 g by mouth Daily As Needed (Use if senna-docusate is ineffective).    potassium chloride (MICRO-K) 10 MEQ CR capsule Take 2 capsules by mouth 2 (Two) Times a Day With Meals.    traMADol (ULTRAM) 50 MG tablet Take 1 tablet by mouth Every 6 (Six) Hours As Needed for Moderate Pain.    ketoconazole (NIZORAL) 2 % shampoo WASH TOPICALLY TO THE SCALP DAILY FOR 2 WEEKS THEN APPLY TOPICALLY EVERY OTHER DAY (Patient not taking: Reported on 3/7/2025)     No current facility-administered medications on file prior to visit.       Past Medical History:   Diagnosis Date    Allergies     Atrial fibrillation, persistent 12/10/2021    Chronic fatigue, unspecified     Depression      Diverticulitis of large intestine without perforation or abscess without bleeding 9/9/2021    Essential (primary) hypertension .    GERD without esophagitis     Hypothyroidism     Syncope and collapse     Type 2 diabetes mellitus     Vitamin D deficiency, unspecified        Allergies:  No Known Allergies    Objective    Objective       Vitals:   Heart Rate:  [92] 92  BP: (124)/(81) 124/81  Body mass index is 37.05 kg/m².     PHYSICAL EXAM:    General Appearance:   well developed  well nourished  HENT:   oropharynx moist  lips not cyanotic  Neck:  thyroid not enlarged  supple  Respiratory:  no respiratory distress  normal breath sounds  no rales  Cardiovascular:  no jugular venous distention  regular rhythm  apical impulse normal  S1 normal, S2 normal  no S3, no S4   no murmur  no rub, no thrill  carotid pulses normal; no bruit  pedal pulses normal  lower extremity edema: none    Skin:   warm, dry  Psychiatric:  judgement and insight appropriate  normal mood and affect        Result Review:  I have personally reviewed the available results from  [x]  Laboratory  [x]  EKG  [x]  Cardiology  [x]  Medications  [x]  Old records  []  Other:       ECG 12 Lead    Date/Time: 3/7/2025 11:09 AM  Performed by: Byron Lopez MD    Authorized by: Byron Lopez MD  Comparison: compared with previous ECG   Rhythm: atrial fibrillation  Rate: normal  QRS axis: normal  Other findings: non-specific ST-T wave changes    Clinical impression: abnormal EKG        Lab Results   Component Value Date    CHOL 146 08/19/2024    CHOL 141 02/14/2024    CHOL 144 01/24/2023     Lab Results   Component Value Date    TRIG 83 08/19/2024    TRIG 70 02/14/2024    TRIG 80 01/24/2023     Lab Results   Component Value Date    HDL 54 08/19/2024    HDL 54 02/14/2024    HDL 48 01/24/2023     Lab Results   Component Value Date    LDL 76 08/19/2024    LDL 73 02/14/2024    LDL 80 01/24/2023     Lab Results   Component Value Date    VLDL 16  08/19/2024    VLDL 14 02/14/2024    VLDL 16 01/24/2023     Results for orders placed in visit on 08/01/22    Adult Transthoracic Echo Complete W/ Cont if Necessary Per Protocol    Interpretation Summary  Fibrocalcific mitral and aortic valves.  Normal left ventricular systolic function.  Moderate mitral regurgitation.  Moderate mitral stenosis.  Mild tricuspid regurgitation.     Impression/Plan:  1.  Chronic diastolic heart failure stable: Continue Lasix 40 mg once a day.  2.  Paroxysmal atrial fibrillation: Continue carvedilol 6.25 mg twice a day.  Continue Eliquis 5 mg twice a day for stroke prevention.  3.  Moderate mitral stenosis/mitral regurgitation: Asymptomatic.  Echocardiogram  4.  Essential hypertension controlled: Continue carvedilol 6.25 mg twice a day.                 Byron Lopez MD   03/07/25   10:55 EST

## 2025-03-07 ENCOUNTER — OFFICE VISIT (OUTPATIENT)
Dept: CARDIOLOGY | Facility: CLINIC | Age: 80
End: 2025-03-07
Payer: MEDICARE

## 2025-03-07 VITALS
HEART RATE: 92 BPM | DIASTOLIC BLOOD PRESSURE: 81 MMHG | BODY MASS INDEX: 36.88 KG/M2 | SYSTOLIC BLOOD PRESSURE: 124 MMHG | HEIGHT: 64 IN | WEIGHT: 216 LBS

## 2025-03-07 DIAGNOSIS — I34.0 NONRHEUMATIC MITRAL VALVE REGURGITATION: ICD-10-CM

## 2025-03-07 DIAGNOSIS — I10 HYPERTENSION, ESSENTIAL: ICD-10-CM

## 2025-03-07 DIAGNOSIS — I50.32 DIASTOLIC CHF, CHRONIC: ICD-10-CM

## 2025-03-07 DIAGNOSIS — I48.0 PAROXYSMAL ATRIAL FIBRILLATION: Primary | ICD-10-CM

## 2025-03-12 RX ORDER — FUROSEMIDE 40 MG/1
40 TABLET ORAL 2 TIMES DAILY
Qty: 60 TABLET | Refills: 5 | Status: SHIPPED | OUTPATIENT
Start: 2025-03-12

## 2025-03-28 ENCOUNTER — TRANSCRIBE ORDERS (OUTPATIENT)
Dept: GENERAL RADIOLOGY | Facility: HOSPITAL | Age: 80
End: 2025-03-28
Payer: MEDICARE

## 2025-03-28 ENCOUNTER — HOSPITAL ENCOUNTER (OUTPATIENT)
Dept: GENERAL RADIOLOGY | Facility: HOSPITAL | Age: 80
Discharge: HOME | End: 2025-03-28
Payer: MEDICARE

## 2025-03-28 DIAGNOSIS — M47.814 THORACIC SPONDYLOSIS WITHOUT MYELOPATHY: ICD-10-CM

## 2025-03-28 DIAGNOSIS — M47.814 THORACIC SPONDYLOSIS WITHOUT MYELOPATHY: Primary | ICD-10-CM

## 2025-03-28 PROCEDURE — 72072 X-RAY EXAM THORAC SPINE 3VWS: CPT

## 2025-04-15 RX ORDER — APIXABAN 5 MG/1
5 TABLET, FILM COATED ORAL
Qty: 180 TABLET | Refills: 3 | Status: SHIPPED | OUTPATIENT
Start: 2025-04-15

## 2025-04-15 NOTE — TELEPHONE ENCOUNTER
Rx Refill Note  Requested Prescriptions     Pending Prescriptions Disp Refills    Eliquis 5 MG tablet tablet [Pharmacy Med Name: ELIQUIS 5MG TABLETS] 180 tablet 3     Sig: TAKE 1 TABLET BY MOUTH EVERY 12 HOURS        LAST OFFICE VISIT:  3/7/2025     NEXT OFFICE VISIT:  9/9/2025     Does the medication requests match the last office note:    [x] Yes   [] No    Does this refill request meet protocol details for MA to approve:     [x] Yes   [] No   [] No Protocols Provided

## 2025-06-02 ENCOUNTER — HOSPITAL ENCOUNTER (OUTPATIENT)
Dept: MAMMOGRAPHY | Facility: HOSPITAL | Age: 80
Discharge: HOME | End: 2025-06-02
Admitting: INTERNAL MEDICINE
Payer: MEDICARE

## 2025-06-02 DIAGNOSIS — Z12.31 SCREENING MAMMOGRAM, ENCOUNTER FOR: ICD-10-CM

## 2025-06-02 PROCEDURE — 77067 SCR MAMMO BI INCL CAD: CPT

## 2025-06-02 PROCEDURE — 77063 BREAST TOMOSYNTHESIS BI: CPT

## 2025-07-01 ENCOUNTER — TELEPHONE (OUTPATIENT)
Dept: INTERNAL MEDICINE | Age: 80
End: 2025-07-01
Payer: MEDICARE

## 2025-07-01 DIAGNOSIS — I10 HYPERTENSION, ESSENTIAL: Primary | ICD-10-CM

## 2025-07-01 RX ORDER — FUROSEMIDE 40 MG/1
40 TABLET ORAL 2 TIMES DAILY
Qty: 60 TABLET | Refills: 5 | Status: SHIPPED | OUTPATIENT
Start: 2025-07-01

## 2025-08-11 DIAGNOSIS — R73.01 IFG (IMPAIRED FASTING GLUCOSE): ICD-10-CM

## 2025-08-11 DIAGNOSIS — E55.9 VITAMIN D DEFICIENCY: ICD-10-CM

## 2025-08-11 DIAGNOSIS — F41.1 ANXIETY, GENERALIZED: ICD-10-CM

## 2025-08-11 DIAGNOSIS — E66.813 CLASS 3 SEVERE OBESITY DUE TO EXCESS CALORIES WITH SERIOUS COMORBIDITY AND BODY MASS INDEX (BMI) OF 40.0 TO 44.9 IN ADULT: ICD-10-CM

## 2025-08-11 DIAGNOSIS — M17.0 PRIMARY OSTEOARTHRITIS OF BOTH KNEES: ICD-10-CM

## 2025-08-11 DIAGNOSIS — I48.91 ATRIAL FIBRILLATION, UNSPECIFIED TYPE: ICD-10-CM

## 2025-08-11 DIAGNOSIS — R06.02 SHORTNESS OF BREATH ON EXERTION: ICD-10-CM

## 2025-08-11 DIAGNOSIS — F32.0 CURRENT MILD EPISODE OF MAJOR DEPRESSIVE DISORDER, UNSPECIFIED WHETHER RECURRENT: ICD-10-CM

## 2025-08-11 DIAGNOSIS — I10 HYPERTENSION, ESSENTIAL: ICD-10-CM

## 2025-08-11 DIAGNOSIS — I50.21 ACUTE SYSTOLIC CONGESTIVE HEART FAILURE: ICD-10-CM

## 2025-08-11 DIAGNOSIS — E78.2 MIXED HYPERLIPIDEMIA: ICD-10-CM

## 2025-08-11 RX ORDER — TRAMADOL HYDROCHLORIDE 50 MG/1
50 TABLET ORAL
Qty: 90 TABLET | Refills: 1 | Status: SHIPPED | OUTPATIENT
Start: 2025-08-11

## 2025-08-20 RX ORDER — POTASSIUM CHLORIDE 750 MG/1
20 CAPSULE, EXTENDED RELEASE ORAL 2 TIMES DAILY WITH MEALS
Qty: 180 CAPSULE | Refills: 3 | Status: SHIPPED | OUTPATIENT
Start: 2025-08-20

## 2025-08-21 ENCOUNTER — CLINICAL SUPPORT (OUTPATIENT)
Dept: INTERNAL MEDICINE | Age: 80
End: 2025-08-21
Payer: MEDICARE

## 2025-08-21 DIAGNOSIS — M54.9 CHRONIC BILATERAL BACK PAIN, UNSPECIFIED BACK LOCATION: ICD-10-CM

## 2025-08-21 DIAGNOSIS — I10 HYPERTENSION, ESSENTIAL: ICD-10-CM

## 2025-08-21 DIAGNOSIS — G93.32 CHRONIC FATIGUE SYNDROME: ICD-10-CM

## 2025-08-21 DIAGNOSIS — E66.813 CLASS 3 SEVERE OBESITY DUE TO EXCESS CALORIES WITH SERIOUS COMORBIDITY AND BODY MASS INDEX (BMI) OF 40.0 TO 44.9 IN ADULT: ICD-10-CM

## 2025-08-21 DIAGNOSIS — G89.29 CHRONIC BILATERAL BACK PAIN, UNSPECIFIED BACK LOCATION: ICD-10-CM

## 2025-08-21 DIAGNOSIS — Z12.31 SCREENING MAMMOGRAM FOR BREAST CANCER: ICD-10-CM

## 2025-08-21 DIAGNOSIS — F32.0 CURRENT MILD EPISODE OF MAJOR DEPRESSIVE DISORDER, UNSPECIFIED WHETHER RECURRENT: ICD-10-CM

## 2025-08-21 DIAGNOSIS — Z00.00 MEDICARE ANNUAL WELLNESS VISIT, SUBSEQUENT: ICD-10-CM

## 2025-08-21 DIAGNOSIS — I48.91 ATRIAL FIBRILLATION, UNSPECIFIED TYPE: ICD-10-CM

## 2025-08-21 DIAGNOSIS — F41.1 ANXIETY, GENERALIZED: ICD-10-CM

## 2025-08-21 DIAGNOSIS — E06.3 HYPOTHYROIDISM DUE TO HASHIMOTO THYROIDITIS: ICD-10-CM

## 2025-08-21 DIAGNOSIS — I50.32 CHRONIC DIASTOLIC (CONGESTIVE) HEART FAILURE: ICD-10-CM

## 2025-08-21 DIAGNOSIS — R73.01 IFG (IMPAIRED FASTING GLUCOSE): ICD-10-CM

## 2025-08-21 DIAGNOSIS — E78.2 MIXED HYPERLIPIDEMIA: ICD-10-CM

## 2025-08-21 DIAGNOSIS — E55.9 VITAMIN D DEFICIENCY: ICD-10-CM

## 2025-08-21 LAB
ALBUMIN SERPL-MCNC: 4 G/DL (ref 3.5–5.2)
ALBUMIN/GLOB SERPL: 1.3 G/DL
ALP SERPL-CCNC: 67 U/L (ref 39–117)
ALT SERPL W P-5'-P-CCNC: 10 U/L (ref 1–33)
ANION GAP SERPL CALCULATED.3IONS-SCNC: 14 MMOL/L (ref 5–15)
AST SERPL-CCNC: 26 U/L (ref 1–32)
BILIRUB SERPL-MCNC: 0.7 MG/DL (ref 0–1.2)
BUN SERPL-MCNC: 16 MG/DL (ref 8–23)
BUN/CREAT SERPL: 9 (ref 7–25)
CALCIUM SPEC-SCNC: 9.7 MG/DL (ref 8.6–10.5)
CHLORIDE SERPL-SCNC: 101 MMOL/L (ref 98–107)
CHOLEST SERPL-MCNC: 140 MG/DL (ref 0–200)
CO2 SERPL-SCNC: 26 MMOL/L (ref 22–29)
CREAT SERPL-MCNC: 1.78 MG/DL (ref 0.57–1)
EGFRCR SERPLBLD CKD-EPI 2021: 28.2 ML/MIN/1.73
GLOBULIN UR ELPH-MCNC: 3 GM/DL
GLUCOSE SERPL-MCNC: 91 MG/DL (ref 65–99)
HBA1C MFR BLD: 5.2 % (ref 4.8–5.6)
HDLC SERPL-MCNC: 46 MG/DL (ref 40–60)
LDLC SERPL CALC-MCNC: 74 MG/DL (ref 0–100)
LDLC/HDLC SERPL: 1.57 {RATIO}
NT-PROBNP SERPL-MCNC: 1568 PG/ML (ref 0–1800)
POTASSIUM SERPL-SCNC: 4.4 MMOL/L (ref 3.5–5.2)
PROT SERPL-MCNC: 7 G/DL (ref 6–8.5)
SODIUM SERPL-SCNC: 141 MMOL/L (ref 136–145)
TRIGL SERPL-MCNC: 108 MG/DL (ref 0–150)
VLDLC SERPL-MCNC: 20 MG/DL (ref 5–40)

## 2025-08-21 PROCEDURE — 36415 COLL VENOUS BLD VENIPUNCTURE: CPT | Performed by: INTERNAL MEDICINE

## 2025-08-28 ENCOUNTER — OFFICE VISIT (OUTPATIENT)
Dept: INTERNAL MEDICINE | Age: 80
End: 2025-08-28
Payer: MEDICARE

## 2025-08-28 VITALS
HEIGHT: 64 IN | BODY MASS INDEX: 35.71 KG/M2 | WEIGHT: 209.2 LBS | OXYGEN SATURATION: 95 % | SYSTOLIC BLOOD PRESSURE: 139 MMHG | HEART RATE: 98 BPM | TEMPERATURE: 96.9 F | DIASTOLIC BLOOD PRESSURE: 96 MMHG

## 2025-08-28 DIAGNOSIS — I48.91 ATRIAL FIBRILLATION, UNSPECIFIED TYPE: ICD-10-CM

## 2025-08-28 DIAGNOSIS — E55.9 VITAMIN D DEFICIENCY: ICD-10-CM

## 2025-08-28 DIAGNOSIS — I50.32 CHRONIC DIASTOLIC (CONGESTIVE) HEART FAILURE: Primary | ICD-10-CM

## 2025-08-28 DIAGNOSIS — R73.01 IFG (IMPAIRED FASTING GLUCOSE): ICD-10-CM

## 2025-08-28 DIAGNOSIS — I10 HYPERTENSION, ESSENTIAL: ICD-10-CM

## 2025-08-28 DIAGNOSIS — E06.3 HYPOTHYROIDISM DUE TO HASHIMOTO THYROIDITIS: ICD-10-CM

## 2025-08-28 DIAGNOSIS — G93.32 CHRONIC FATIGUE SYNDROME: ICD-10-CM

## 2025-08-28 DIAGNOSIS — K21.9 GERD WITHOUT ESOPHAGITIS: ICD-10-CM

## 2025-08-28 DIAGNOSIS — Z12.31 SCREENING MAMMOGRAM FOR BREAST CANCER: ICD-10-CM

## 2025-08-28 DIAGNOSIS — F32.0 CURRENT MILD EPISODE OF MAJOR DEPRESSIVE DISORDER, UNSPECIFIED WHETHER RECURRENT: ICD-10-CM

## 2025-08-28 DIAGNOSIS — E78.2 MIXED HYPERLIPIDEMIA: ICD-10-CM

## 2025-08-28 DIAGNOSIS — M17.0 PRIMARY OSTEOARTHRITIS OF BOTH KNEES: ICD-10-CM

## 2025-08-28 DIAGNOSIS — F41.1 ANXIETY, GENERALIZED: ICD-10-CM

## 2025-08-28 DIAGNOSIS — E66.813 CLASS 3 SEVERE OBESITY DUE TO EXCESS CALORIES WITH SERIOUS COMORBIDITY AND BODY MASS INDEX (BMI) OF 40.0 TO 44.9 IN ADULT: ICD-10-CM

## 2025-08-28 RX ORDER — TRAMADOL HYDROCHLORIDE 50 MG/1
50 TABLET ORAL EVERY 6 HOURS PRN
COMMUNITY

## 2025-08-28 RX ORDER — POTASSIUM CHLORIDE 750 MG/1
20 CAPSULE, EXTENDED RELEASE ORAL 2 TIMES DAILY WITH MEALS
COMMUNITY

## 2025-08-28 RX ORDER — KETOCONAZOLE 20 MG/ML
SHAMPOO, SUSPENSION TOPICAL DAILY
COMMUNITY